# Patient Record
Sex: MALE | Race: WHITE | NOT HISPANIC OR LATINO | Employment: FULL TIME
[De-identification: names, ages, dates, MRNs, and addresses within clinical notes are randomized per-mention and may not be internally consistent; named-entity substitution may affect disease eponyms.]

---

## 2019-11-05 ENCOUNTER — HEALTH MAINTENANCE LETTER (OUTPATIENT)
Age: 49
End: 2019-11-05

## 2020-11-22 ENCOUNTER — HEALTH MAINTENANCE LETTER (OUTPATIENT)
Age: 50
End: 2020-11-22

## 2020-12-21 ENCOUNTER — OFFICE VISIT (OUTPATIENT)
Dept: INTERNAL MEDICINE | Facility: CLINIC | Age: 50
End: 2020-12-21
Payer: COMMERCIAL

## 2020-12-21 ENCOUNTER — TELEPHONE (OUTPATIENT)
Dept: FAMILY MEDICINE | Facility: CLINIC | Age: 50
End: 2020-12-21

## 2020-12-21 VITALS
HEIGHT: 72 IN | OXYGEN SATURATION: 98 % | SYSTOLIC BLOOD PRESSURE: 151 MMHG | WEIGHT: 214.7 LBS | RESPIRATION RATE: 18 BRPM | DIASTOLIC BLOOD PRESSURE: 95 MMHG | TEMPERATURE: 98.2 F | BODY MASS INDEX: 29.08 KG/M2 | HEART RATE: 112 BPM

## 2020-12-21 DIAGNOSIS — J01.90 ACUTE SINUSITIS WITH SYMPTOMS > 10 DAYS: Primary | ICD-10-CM

## 2020-12-21 DIAGNOSIS — J02.9 PHARYNGITIS, UNSPECIFIED ETIOLOGY: ICD-10-CM

## 2020-12-21 LAB
DEPRECATED S PYO AG THROAT QL EIA: NEGATIVE
FLUAV+FLUBV AG SPEC QL: NEGATIVE
FLUAV+FLUBV AG SPEC QL: NEGATIVE
SPECIMEN SOURCE: NORMAL
STREP GROUP A PCR: NOT DETECTED

## 2020-12-21 PROCEDURE — 87804 INFLUENZA ASSAY W/OPTIC: CPT | Performed by: INTERNAL MEDICINE

## 2020-12-21 PROCEDURE — 99N1174 PR STATISTIC STREP A RAPID: Performed by: INTERNAL MEDICINE

## 2020-12-21 PROCEDURE — 87651 STREP A DNA AMP PROBE: CPT | Performed by: INTERNAL MEDICINE

## 2020-12-21 PROCEDURE — 99203 OFFICE O/P NEW LOW 30 MIN: CPT | Performed by: INTERNAL MEDICINE

## 2020-12-21 RX ORDER — LAMOTRIGINE 150 MG/1
150 TABLET ORAL 2 TIMES DAILY WITH MEALS
COMMUNITY
Start: 2020-06-27 | End: 2022-09-21

## 2020-12-21 RX ORDER — AZITHROMYCIN 250 MG/1
TABLET, FILM COATED ORAL
Qty: 6 TABLET | Refills: 0 | Status: SHIPPED | OUTPATIENT
Start: 2020-12-21 | End: 2020-12-26

## 2020-12-21 ASSESSMENT — MIFFLIN-ST. JEOR: SCORE: 1876.87

## 2020-12-21 NOTE — TELEPHONE ENCOUNTER
Patient calls to report having a cold for a couple weeks and he is now having sinus pressure.  No travel, No known exposure, sore throat from post nasal drip, no fever, no fatigue or body aches, always has taste and smell issues.

## 2020-12-21 NOTE — NURSING NOTE
BP (!) 151/95 (BP Location: Left arm, Patient Position: Sitting, Cuff Size: Adult Regular)   Pulse 112   Temp 98.2  F (36.8  C) (Oral)   Resp 18   Ht 1.829 m (6')   Wt 97.4 kg (214 lb 11.2 oz)   SpO2 98%   BMI 29.12 kg/m

## 2020-12-21 NOTE — PROGRESS NOTES
Subjective     Westley Singh is a 50 year old male who presents to clinic today for the following health issues:    HPI       Presents with complaints of 4 to 5 weeks of persistent nasal congestion, postnasal drainage, rhinorrhea.  His symptoms started fairly suddenly with a lot of sneezing, nasal stuffiness.  It remained congested since that time though the sneezing seem to have cleared.  He is already Allegra for allergies, has been taking some Sudafed.  He has had some gradual increase with occasional slight yellow mucus but never very thick or green, some mild sore throat in the morning from drainage then today he awakened with swelling of his left cheek and along the nose that was quite marked this morning.  Is improved a little with ice but still red and tender alongside the nose, also has some mild soreness in his teeth on the left side today.  He has some mild ear pressure today.  He has had some slight cloudiness of his vision in the morning but no other discharge from the eyes, soreness of the eyes.    He donates blood and platelets and has had a couple of negative Covid antibody test done since the onset of the symptoms.  No known exposures to any illnesses.        Patient Active Problem List   Diagnosis     Nasal polyp     CARDIOVASCULAR SCREENING; LDL GOAL LESS THAN 160     Mild persistent asthma     Anxiety     Insomnia     Major depressive disorder, recurrent episode (H)     Major depressive disorder, recurrent episode, moderate (H)     ADD (attention deficit disorder)     Current Outpatient Medications   Medication Sig Dispense Refill     albuterol (PROAIR HFA, PROVENTIL HFA, VENTOLIN HFA) 108 (90 BASE) MCG/ACT inhaler Inhale 2 puffs into the lungs every 6 hours as needed for shortness of breath / dyspnea 3 Inhaler 1     ALLEGRA 180 MG OR TABS 1 tablet qd  11     fluticasone-salmeterol (ADVAIR DISKUS) 250-50 MCG/DOSE diskus inhaler Inhale 1 puff into the lungs daily 3 Inhaler 3     lamoTRIgine  (LAMICTAL) 150 MG tablet Take 150 mg by mouth 2 times daily (with meals)          Review of Systems   No fever, chills, loss of smell or taste, headache, feels some slight tightness in the chest, known asthma.  No significant dyspnea on exertion, minimal cough that is related to postnasal drainage.      Objective    BP (!) 151/95 (BP Location: Left arm, Patient Position: Sitting, Cuff Size: Adult Regular)   Pulse 112   Temp 98.2  F (36.8  C) (Oral)   Resp 18   Ht 1.829 m (6')   Wt 97.4 kg (214 lb 11.2 oz)   SpO2 98%   BMI 29.12 kg/m    Body mass index is 29.12 kg/m .  Physical Exam       TM's are clear   Nasal mucosa with marked edema on the left with pale mucosa, mild to moderate swelling on the right.  On the left, mucus is  present, slightly thick, grayish  Sinuses are with tenderness on the left maxillary sinus, also some moderate swelling, erythema and induration along the left bridge of the nose which is tender, mild to moderate swelling of the cheek under the eye on the left.  Posterior pharynx without erythema, without exudate  Anterior cervical nodes are not present   Lungs are clear with normal respiration, mild  wheezes are present with forced expiration.    Strep:  Influenza:        Assessment & Plan     Acute sinusitis with symptoms > 10 days  He may have underlying sinusitis, may have a tear duct infection currently.  Will treat with an antibiotic, recommend warm packing to the tear duct, then can use ice on the swelling of the face, add Flonase, continue Sudafed and Allegra, and Mucinex if any thick mucus.  Advised he may need to be evaluated again if there is marked increase in the facial redness and pain  - azithromycin (ZITHROMAX) 250 MG tablet; Take 2 tablets (500 mg) by mouth daily for 1 day, THEN 1 tablet (250 mg) daily for 4 days.    Pharyngitis, unspecified etiology  Likely related to the postnasal drainage.  - Influenza A & B Antigen  - Streptococcus A Rapid Scr w Reflx to  PCR    Return in about 3 months (around 3/21/2021) for Physical Exam.    Jumana Staley MD  [facility]

## 2021-03-24 ENCOUNTER — OFFICE VISIT (OUTPATIENT)
Dept: FAMILY MEDICINE | Facility: CLINIC | Age: 51
End: 2021-03-24
Payer: COMMERCIAL

## 2021-03-24 VITALS
DIASTOLIC BLOOD PRESSURE: 96 MMHG | WEIGHT: 222.9 LBS | HEIGHT: 71 IN | HEART RATE: 78 BPM | BODY MASS INDEX: 31.21 KG/M2 | TEMPERATURE: 98.7 F | OXYGEN SATURATION: 99 % | RESPIRATION RATE: 16 BRPM | SYSTOLIC BLOOD PRESSURE: 138 MMHG

## 2021-03-24 DIAGNOSIS — Z13.220 SCREENING FOR HYPERLIPIDEMIA: ICD-10-CM

## 2021-03-24 DIAGNOSIS — B35.1 FUNGAL INFECTION OF NAIL: ICD-10-CM

## 2021-03-24 DIAGNOSIS — Z00.00 ROUTINE GENERAL MEDICAL EXAMINATION AT A HEALTH CARE FACILITY: ICD-10-CM

## 2021-03-24 DIAGNOSIS — F33.1 MAJOR DEPRESSIVE DISORDER, RECURRENT EPISODE, MODERATE (H): ICD-10-CM

## 2021-03-24 DIAGNOSIS — Z12.11 SCREEN FOR COLON CANCER: Primary | ICD-10-CM

## 2021-03-24 DIAGNOSIS — J45.30 MILD PERSISTENT ASTHMA WITHOUT COMPLICATION: ICD-10-CM

## 2021-03-24 DIAGNOSIS — Z11.59 NEED FOR HEPATITIS C SCREENING TEST: ICD-10-CM

## 2021-03-24 DIAGNOSIS — R03.0 ELEVATED BP WITHOUT DIAGNOSIS OF HYPERTENSION: ICD-10-CM

## 2021-03-24 DIAGNOSIS — R79.89 ELEVATED TSH: ICD-10-CM

## 2021-03-24 DIAGNOSIS — Z11.4 SCREENING FOR HIV (HUMAN IMMUNODEFICIENCY VIRUS): ICD-10-CM

## 2021-03-24 LAB
ALBUMIN SERPL-MCNC: 3.7 G/DL (ref 3.4–5)
ALP SERPL-CCNC: 48 U/L (ref 40–150)
ALT SERPL W P-5'-P-CCNC: 32 U/L (ref 0–70)
ANION GAP SERPL CALCULATED.3IONS-SCNC: <1 MMOL/L (ref 3–14)
AST SERPL W P-5'-P-CCNC: 20 U/L (ref 0–45)
BILIRUB SERPL-MCNC: 0.4 MG/DL (ref 0.2–1.3)
BUN SERPL-MCNC: 14 MG/DL (ref 7–30)
CALCIUM SERPL-MCNC: 8.8 MG/DL (ref 8.5–10.1)
CHLORIDE SERPL-SCNC: 105 MMOL/L (ref 94–109)
CHOLEST SERPL-MCNC: 150 MG/DL
CO2 SERPL-SCNC: 33 MMOL/L (ref 20–32)
CREAT SERPL-MCNC: 0.88 MG/DL (ref 0.66–1.25)
GFR SERPL CREATININE-BSD FRML MDRD: >90 ML/MIN/{1.73_M2}
GLUCOSE SERPL-MCNC: 98 MG/DL (ref 70–99)
HCV AB SERPL QL IA: NONREACTIVE
HDLC SERPL-MCNC: 65 MG/DL
HIV 1+2 AB+HIV1 P24 AG SERPL QL IA: NONREACTIVE
LDLC SERPL CALC-MCNC: 75 MG/DL
NONHDLC SERPL-MCNC: 85 MG/DL
POTASSIUM SERPL-SCNC: 4.3 MMOL/L (ref 3.4–5.3)
PROT SERPL-MCNC: 6.9 G/DL (ref 6.8–8.8)
SODIUM SERPL-SCNC: 138 MMOL/L (ref 133–144)
TRIGL SERPL-MCNC: 48 MG/DL
TSH SERPL DL<=0.005 MIU/L-ACNC: 1.04 MU/L (ref 0.4–4)

## 2021-03-24 PROCEDURE — 99213 OFFICE O/P EST LOW 20 MIN: CPT | Mod: 25 | Performed by: NURSE PRACTITIONER

## 2021-03-24 PROCEDURE — 36415 COLL VENOUS BLD VENIPUNCTURE: CPT | Performed by: NURSE PRACTITIONER

## 2021-03-24 PROCEDURE — 80053 COMPREHEN METABOLIC PANEL: CPT | Performed by: NURSE PRACTITIONER

## 2021-03-24 PROCEDURE — 86803 HEPATITIS C AB TEST: CPT | Performed by: NURSE PRACTITIONER

## 2021-03-24 PROCEDURE — 80061 LIPID PANEL: CPT | Performed by: NURSE PRACTITIONER

## 2021-03-24 PROCEDURE — 84443 ASSAY THYROID STIM HORMONE: CPT | Performed by: NURSE PRACTITIONER

## 2021-03-24 PROCEDURE — 87389 HIV-1 AG W/HIV-1&-2 AB AG IA: CPT | Performed by: NURSE PRACTITIONER

## 2021-03-24 PROCEDURE — 99396 PREV VISIT EST AGE 40-64: CPT | Performed by: NURSE PRACTITIONER

## 2021-03-24 PROCEDURE — 96127 BRIEF EMOTIONAL/BEHAV ASSMT: CPT | Performed by: NURSE PRACTITIONER

## 2021-03-24 RX ORDER — ALBUTEROL SULFATE 90 UG/1
2 AEROSOL, METERED RESPIRATORY (INHALATION) EVERY 6 HOURS PRN
Qty: 8 G | Refills: 1 | Status: SHIPPED | OUTPATIENT
Start: 2021-03-24 | End: 2023-04-24

## 2021-03-24 ASSESSMENT — MIFFLIN-ST. JEOR: SCORE: 1902.01

## 2021-03-24 ASSESSMENT — ENCOUNTER SYMPTOMS
FEVER: 0
HEARTBURN: 0
HEADACHES: 1
EYE PAIN: 0
HEMATOCHEZIA: 0
PARESTHESIAS: 0
MYALGIAS: 0
DIZZINESS: 0
JOINT SWELLING: 0
ABDOMINAL PAIN: 0
PALPITATIONS: 0
SORE THROAT: 0
DYSURIA: 0
ARTHRALGIAS: 0
DIARRHEA: 0
NAUSEA: 0
FREQUENCY: 0
NERVOUS/ANXIOUS: 0
CHILLS: 0
CONSTIPATION: 0
SHORTNESS OF BREATH: 0
WEAKNESS: 0
HEMATURIA: 0
COUGH: 0

## 2021-03-24 ASSESSMENT — ANXIETY QUESTIONNAIRES
2. NOT BEING ABLE TO STOP OR CONTROL WORRYING: NOT AT ALL
3. WORRYING TOO MUCH ABOUT DIFFERENT THINGS: SEVERAL DAYS
IF YOU CHECKED OFF ANY PROBLEMS ON THIS QUESTIONNAIRE, HOW DIFFICULT HAVE THESE PROBLEMS MADE IT FOR YOU TO DO YOUR WORK, TAKE CARE OF THINGS AT HOME, OR GET ALONG WITH OTHER PEOPLE: NOT DIFFICULT AT ALL
5. BEING SO RESTLESS THAT IT IS HARD TO SIT STILL: NOT AT ALL
7. FEELING AFRAID AS IF SOMETHING AWFUL MIGHT HAPPEN: NOT AT ALL
6. BECOMING EASILY ANNOYED OR IRRITABLE: SEVERAL DAYS
1. FEELING NERVOUS, ANXIOUS, OR ON EDGE: SEVERAL DAYS
GAD7 TOTAL SCORE: 4

## 2021-03-24 ASSESSMENT — PATIENT HEALTH QUESTIONNAIRE - PHQ9
5. POOR APPETITE OR OVEREATING: SEVERAL DAYS
SUM OF ALL RESPONSES TO PHQ QUESTIONS 1-9: 4

## 2021-03-24 NOTE — PROGRESS NOTES
"  3  SUBJECTIVE:   CC: Westley Singh is an 50 year old male who presents for preventive health visit.     {Split Bill scripting  The purpose of this visit is to discuss your medical history and prevent health problems before you are sick. You may be responsible for a co-pay, coinsurance, or deductible if your visit today includes services such as checking on a sore throat, having an x-ray or lab test, or treating and evaluating a new or existing condition :675880}  Patient has been advised of split billing requirements and indicates understanding: {Yes and No:861506}  Healthy Habits:    Do you get at least three servings of calcium containing foods daily (dairy, green leafy vegetables, etc.)? { :162329::\"yes\"}    Amount of exercise or daily activities, outside of work: { :617590}    Problems taking medications regularly { :675096::\"No\"}    Medication side effects: { :026637::\"No\"}    Have you had an eye exam in the past two years? { :903118}    Do you see a dentist twice per year? { :511670}    Do you have sleep apnea, excessive snoring or daytime drowsiness?{ :361118}  {Outside tests to abstract? :904749}    {additional problems to add (Optional):736807}    Today's PHQ-2 Score:   PHQ-2 ( 1999 Pfizer) 3/24/2021 12/13/2013   Q1: Little interest or pleasure in doing things - 1   Q2: Feeling down, depressed or hopeless - 1   PHQ-2 Score - 2   PHQ-2 Score Incomplete -     {PHQ-2 LOOK IN ASSESSMENTS (Optional) :159712}  Abuse: Current or Past(Physical, Sexual or Emotional)- {YES/NO/NA:046597}  Do you feel safe in your environment? {YES/NO/NA:782368}    Have you ever done Advance Care Planning? (For example, a Health Directive, POLST, or a discussion with a medical provider or your loved ones about your wishes): { :471475}    Social History     Tobacco Use     Smoking status: Current Some Day Smoker     Types: Cigars     Smokeless tobacco: Never Used   Substance Use Topics     Alcohol use: Yes     Comment: occasional use " "    If you drink alcohol do you typically have >3 drinks per day or >7 drinks per week? {ETOH :933985}                      Last PSA: No results found for: PSA    Reviewed orders with patient. Reviewed health maintenance and updated orders accordingly - {Yes/No:575559::\"Yes\"}  {Chronicprobdata (Optional):610809}    Reviewed and updated as needed this visit by clinical staff                 Reviewed and updated as needed this visit by Provider                {HISTORY OPTIONS (Optional):361138}    ROS:  { :370508::\"CONSTITUTIONAL: NEGATIVE for fever, chills, change in weight\",\"INTEGUMENTARY/SKIN: NEGATIVE for worrisome rashes, moles or lesions\",\"EYES: NEGATIVE for vision changes or irritation\",\"ENT: NEGATIVE for ear, mouth and throat problems\",\"RESP: NEGATIVE for significant cough or SOB\",\"CV: NEGATIVE for chest pain, palpitations or peripheral edema\",\"GI: NEGATIVE for nausea, abdominal pain, heartburn, or change in bowel habits\",\" male: negative for dysuria, hematuria, decreased urinary stream, erectile dysfunction, urethral discharge\",\"MUSCULOSKELETAL: NEGATIVE for significant arthralgias or myalgia\",\"NEURO: NEGATIVE for weakness, dizziness or paresthesias\",\"PSYCHIATRIC: NEGATIVE for changes in mood or affect\"}    OBJECTIVE:   There were no vitals taken for this visit.  EXAM:  {Exam Choices:979471}    {Diagnostic Test Results (Optional):819966::\"Diagnostic Test Results:\",\"Labs reviewed in Epic\"}    ASSESSMENT/PLAN:   {Diag Picklist:262989}    Patient has been advised of split billing requirements and indicates understanding: {YES / NO:667923::\"Yes\"}  COUNSELING:  {MALE COUNSELING MESSAGES:010999::\"Reviewed preventive health counseling, as reflected in patient instructions\"}    Estimated body mass index is 29.12 kg/m  as calculated from the following:    Height as of 12/21/20: 1.829 m (6').    Weight as of 12/21/20: 97.4 kg (214 lb 11.2 oz).    {Weight Management Plan (ACO) Complete if BMI is abnormal-  Ages 18-64  " BMI >24.9.  Age 65+ with BMI <23 or >30 (Optional):876250}    He reports that he has been smoking cigars. He has never used smokeless tobacco.  Tobacco Cessation Action Plan:   {TOBACCO CESSATION ACTION PLAN:283252}      Counseling Resources:  ATP IV Guidelines  Pooled Cohorts Equation Calculator  FRAX Risk Assessment  ICSI Preventive Guidelines  Dietary Guidelines for Americans, 2010  USDA's MyPlate  ASA Prophylaxis  Lung CA Screening    RUI Dickinson CNP  [facility]

## 2021-03-24 NOTE — NURSING NOTE
"Chief Complaint   Patient presents with     Physical     Initial BP (!) 138/96 (BP Location: Right arm, Patient Position: Sitting, Cuff Size: Adult Regular)   Pulse 78   Temp 98.7  F (37.1  C) (Oral)   Resp 16   Ht 1.809 m (5' 11.24\")   Wt 101.1 kg (222 lb 14.4 oz)   SpO2 99%   BMI 30.88 kg/m   Estimated body mass index is 30.88 kg/m  as calculated from the following:    Height as of this encounter: 1.809 m (5' 11.24\").    Weight as of this encounter: 101.1 kg (222 lb 14.4 oz).  BP completed using cuff size regular long right arm    Lisa Magill, CMA    "

## 2021-03-24 NOTE — PROGRESS NOTES
SUBJECTIVE:   CC: Westley Singh is an 50 year old male who presents for preventative health visit.       Patient has been advised of split billing requirements and indicates understanding: Yes  Healthy Habits:     Getting at least 3 servings of Calcium per day:  Yes    Bi-annual eye exam:  Yes    Dental care twice a year:  Yes    Sleep apnea or symptoms of sleep apnea:  None    Diet:  Regular (no restrictions)    Frequency of exercise:  6-7 days/week    Duration of exercise:  45-60 minutes    Taking medications regularly:  Yes    Medication side effects:  None    PHQ-2 Total Score: 2    Additional concerns today:  Yes    Asthma: not using daily inhaler anymore.  This winter symptoms seem to have worsened.  Using rescue inhaler 2x/week.  Typically spring is his worst time. Triggers: dust, seasonal allergies (spring time), and exercise.  Allergies are the biggest trigger.    ACT Total Scores 5/19/2014 12/19/2014 3/24/2021   ACT TOTAL SCORE 21 22 -   ASTHMA ER VISITS 0 = None 0 = None -   ASTHMA HOSPITALIZATIONS 0 = None 0 = None -   ACT TOTAL SCORE (Goal Greater than or Equal to 20) - - 20   In the past 12 months, how many times did you visit the emergency room for your asthma without being admitted to the hospital? - - 0   In the past 12 months, how many times were you hospitalized overnight because of your asthma? - - 0     Depression: managed by psych at [facility].  Taking daily lamictal and reports symptoms are well controlled.     PHQ 3/24/2021   PHQ-9 Total Score 4   Q9: Thoughts of better off dead/self-harm past 2 weeks Not at all       Concern - fungus   Onset: 2 years     Description:   Left big toe     Intensity: no pain    Progression of Symptoms:  worsening    Accompanying Signs & Symptoms:  Discolored and now nail falling apart    Previous history of similar problem:   Yes     Precipitating factors:   Worsened by: nothing     Alleviating factors:  Improved by: NOTHING     Therapies Tried and outcome:  antifungal medication did not do anything     Today's PHQ-2 Score:   PHQ-2 ( 1999 Pfizer) 3/24/2021   Q1: Little interest or pleasure in doing things 1   Q2: Feeling down, depressed or hopeless 1   PHQ-2 Score 2   Q1: Little interest or pleasure in doing things Several days   Q2: Feeling down, depressed or hopeless Several days   PHQ-2 Score 2       Abuse: Current or Past(Physical, Sexual or Emotional)- NO  Do you feel safe in your environment? YES    Have you ever done Advance Care Planning? (For example, a Health Directive, POLST, or a discussion with a medical provider or your loved ones about your wishes): No, advance care planning information given to patient to review.  Patient declined advance care planning discussion at this time.    Social History     Tobacco Use     Smoking status: Former Smoker     Types: Cigars     Smokeless tobacco: Never Used   Substance Use Topics     Alcohol use: Yes     Comment: occasional use     If you drink alcohol do you typically have >3 drinks per day or >7 drinks per week? No    Alcohol Use 3/24/2021   Prescreen: >3 drinks/day or >7 drinks/week? No   Prescreen: >3 drinks/day or >7 drinks/week? -   No flowsheet data found.    Last PSA: No results found for: PSA    Reviewed orders with patient. Reviewed health maintenance and updated orders accordingly - Yes  Labs reviewed in EPIC  BP Readings from Last 3 Encounters:   03/24/21 (!) 138/96   12/21/20 (!) 151/95   12/19/14 112/80    Wt Readings from Last 3 Encounters:   03/24/21 101.1 kg (222 lb 14.4 oz)   12/21/20 97.4 kg (214 lb 11.2 oz)   12/19/14 121.6 kg (268 lb)               Current Outpatient Medications   Medication Sig Dispense Refill     albuterol (PROAIR HFA/PROVENTIL HFA/VENTOLIN HFA) 108 (90 Base) MCG/ACT inhaler Inhale 2 puffs into the lungs every 6 hours as needed for shortness of breath / dyspnea 8 g 1     ALLEGRA 180 MG OR TABS 1 tablet qd  11     lamoTRIgine (LAMICTAL) 150 MG tablet Take 150 mg by mouth 2 times  "daily (with meals)       Allergies   Allergen Reactions     Aspirin      Cephalosporins      Penicillins        Reviewed and updated as needed this visit by clinical staff  Tobacco  Allergies  Meds  Problems  Med Hx  Surg Hx  Fam Hx  Soc Hx          Reviewed and updated as needed this visit by Provider  Tobacco  Allergies  Meds  Problems  Med Hx  Surg Hx  Fam Hx         Past Medical History:   Diagnosis Date     Anxiety      Asthma      Depression      Kidney infection 2009    Hospitalized x 2      Past Surgical History:   Procedure Laterality Date     ENT SURGERY      nasal polyps times 2     HERNIA REPAIR      under 3 years old     MAMMOPLASTY REDUCTION  age 19       Review of Systems   Constitutional: Negative for chills and fever.   HENT: Negative for congestion, ear pain, hearing loss and sore throat.    Eyes: Negative for pain and visual disturbance.   Respiratory: Negative for cough and shortness of breath.    Cardiovascular: Negative for chest pain, palpitations and peripheral edema.   Gastrointestinal: Negative for abdominal pain, constipation, diarrhea, heartburn, hematochezia and nausea.   Genitourinary: Negative for discharge, dysuria, frequency, genital sores, hematuria, impotence and urgency.   Musculoskeletal: Negative for arthralgias, joint swelling and myalgias.   Skin: Negative for rash.   Neurological: Positive for headaches. Negative for dizziness, weakness and paresthesias.   Psychiatric/Behavioral: Negative for mood changes. The patient is not nervous/anxious.        OBJECTIVE:   BP (!) 138/96 (BP Location: Right arm, Patient Position: Sitting, Cuff Size: Adult Regular)   Pulse 78   Temp 98.7  F (37.1  C) (Oral)   Resp 16   Ht 1.809 m (5' 11.24\")   Wt 101.1 kg (222 lb 14.4 oz)   SpO2 99%   BMI 30.88 kg/m      Physical Exam  GENERAL: healthy, alert and no distress  EYES: Eyes grossly normal to inspection, PERRL and conjunctivae and sclerae normal  HENT: ear canals and TM's " normal, nose and mouth without ulcers or lesions  NECK: no adenopathy, no asymmetry, masses, or scars and thyroid normal to palpation  RESP: lungs clear to auscultation - no rales, rhonchi or wheezes  CV: regular rate and rhythm, normal S1 S2, no S3 or S4, no murmur, click or rub, no peripheral edema and peripheral pulses strong  ABDOMEN: soft, nontender, no hepatosplenomegaly, no masses and bowel sounds normal  MS: no gross musculoskeletal defects noted, no edema  SKIN: no suspicious lesions or rashes, all five nails on L foot brittle, ragged, splitting and yellow  NEURO: Normal strength and tone, mentation intact and speech normal  PSYCH: mentation appears normal, affect normal/bright    Diagnostic Test Results:  Labs reviewed in Epic    ASSESSMENT/PLAN:   1. Routine general medical examination at a health care facility  - Lipid panel reflex to direct LDL Fasting  - Comprehensive metabolic panel (BMP + Alb, Alk Phos, ALT, AST, Total. Bili, TP)    2. Screening for HIV (human immunodeficiency virus)  - HIV Antigen Antibody Combo    3. Need for hepatitis C screening test  - Hepatitis C Screen Reflex to HCV RNA Quant and Genotype    4. Screening for hyperlipidemia  Fasting for lab today.     5. Screen for colon cancer  Turns 51 next month.  Discussed screening options.  Prefers cologuard.  Wait until after birthday to do the test.    - COLOGUARD([employer])    6. Mild persistent asthma without complication  Controlled; though has noticed an increase in albuterol use this week.  If worsens may need to go back on daily inhaler during seasonal flares (allergy season).   - albuterol (PROAIR HFA/PROVENTIL HFA/VENTOLIN HFA) 108 (90 Base) MCG/ACT inhaler; Inhale 2 puffs into the lungs every 6 hours as needed for shortness of breath / dyspnea  Dispense: 8 g; Refill: 1    7. Fungal infection of nail  Will start on oral tx if liver enzymes are normal.   - Comprehensive metabolic panel (BMP + Alb, Alk Phos, ALT, AST, Total.  "Judith, MARILYNN)    8. Elevated TSH  Reports has been borderline in the past; rechecking with labs today.   - TSH with free T4 reflex    9. Major depressive disorder, recurrent episode, moderate (H)  Continue daily lamictal; managed by psych.    10. Elevated BP without diagnosis of hypertension  Mildly elevated.  He feels increase in BP is related to increase in job stress.  Continue daily checks at home.  Reduce salt in diet, eat plenty of fruits and vegetables, work on weight loss.  Stay active (walks the dogs 2x/day for total of 40 minutes).  Will see him for follow-up in 2-3 months if still mildly elevated despite working on lifestyle changes consider treatment--consider treating sooner if seeing rising numbers at home.         COUNSELING:   Reviewed preventive health counseling, as reflected in patient instructions       Regular exercise       Healthy diet/nutrition       Consider Hep C screening for all patients one time for ages 18-79 years       HIV screeninx in teen years, 1x in adult years, and at intervals if high risk       Colon cancer screening    Estimated body mass index is 30.88 kg/m  as calculated from the following:    Height as of this encounter: 1.809 m (5' 11.24\").    Weight as of this encounter: 101.1 kg (222 lb 14.4 oz).     Weight management plan: Discussed healthy diet and exercise guidelines    He reports that he has quit smoking. His smoking use included cigars. He has never used smokeless tobacco.  Tobacco Cessation Action Plan:   not smoking      Counseling Resources:  ATP IV Guidelines  Pooled Cohorts Equation Calculator  FRAX Risk Assessment  ICSI Preventive Guidelines  Dietary Guidelines for Americans,   USDA's MyPlate  ASA Prophylaxis  Lung CA Screening    Abby Fuentes, RUI [facility]  "

## 2021-03-24 NOTE — PATIENT INSTRUCTIONS
Preventive Health Recommendations  Male Ages 40 to 49    Yearly exam:             See your health care provider every year in order to  o   Review health changes.   o   Discuss preventive care.    o   Review your medicines if your doctor has prescribed any.    You should be tested each year for STDs (sexually transmitted diseases) if you re at risk.     Have a cholesterol test every 5 years.     Have a colonoscopy (test for colon cancer) if someone in your family has had colon cancer or polyps before age 50.     After age 45, have a diabetes test (fasting glucose). If you are at risk for diabetes, you should have this test every 3 years.      Talk with your health care provider about whether or not a prostate cancer screening test (PSA) is right for you.    Shots: Get a flu shot each year. Get a tetanus shot every 10 years.     Nutrition:    Eat at least 5 servings of fruits and vegetables daily.     Eat whole-grain bread, whole-wheat pasta and brown rice instead of white grains and rice.     Get adequate Calcium and Vitamin D.     Lifestyle    Exercise for at least 150 minutes a week (30 minutes a day, 5 days a week). This will help you control your weight and prevent disease.     Limit alcohol to one drink per day.     No smoking.     Wear sunscreen to prevent skin cancer.     See your dentist every six months for an exam and cleaning.    Call [facility] for stool test to screen for colon cancer--[phone]

## 2021-03-25 ASSESSMENT — ANXIETY QUESTIONNAIRES: GAD7 TOTAL SCORE: 4

## 2021-03-25 ASSESSMENT — ASTHMA QUESTIONNAIRES: ACT_TOTALSCORE: 20

## 2021-04-02 ENCOUNTER — ANCILLARY PROCEDURE (OUTPATIENT)
Dept: GENERAL RADIOLOGY | Facility: CLINIC | Age: 51
End: 2021-04-02
Attending: PHYSICIAN ASSISTANT
Payer: COMMERCIAL

## 2021-04-02 ENCOUNTER — OFFICE VISIT (OUTPATIENT)
Dept: FAMILY MEDICINE | Facility: CLINIC | Age: 51
End: 2021-04-02
Payer: COMMERCIAL

## 2021-04-02 VITALS
OXYGEN SATURATION: 98 % | WEIGHT: 228 LBS | SYSTOLIC BLOOD PRESSURE: 146 MMHG | HEIGHT: 72 IN | HEART RATE: 66 BPM | RESPIRATION RATE: 18 BRPM | DIASTOLIC BLOOD PRESSURE: 96 MMHG | BODY MASS INDEX: 30.88 KG/M2 | TEMPERATURE: 98 F

## 2021-04-02 DIAGNOSIS — B35.1 FUNGAL INFECTION OF NAIL: ICD-10-CM

## 2021-04-02 DIAGNOSIS — M79.675 PAIN OF TOE OF LEFT FOOT: ICD-10-CM

## 2021-04-02 DIAGNOSIS — R03.0 ELEVATED BP WITHOUT DIAGNOSIS OF HYPERTENSION: ICD-10-CM

## 2021-04-02 DIAGNOSIS — M79.675 PAIN OF TOE OF LEFT FOOT: Primary | ICD-10-CM

## 2021-04-02 PROCEDURE — 99214 OFFICE O/P EST MOD 30 MIN: CPT | Performed by: PHYSICIAN ASSISTANT

## 2021-04-02 PROCEDURE — 73660 X-RAY EXAM OF TOE(S): CPT | Mod: LT | Performed by: RADIOLOGY

## 2021-04-02 RX ORDER — TERBINAFINE HYDROCHLORIDE 250 MG/1
250 TABLET ORAL DAILY
Qty: 90 TABLET | Refills: 0 | Status: SHIPPED | OUTPATIENT
Start: 2021-04-02 | End: 2021-06-08

## 2021-04-02 ASSESSMENT — MIFFLIN-ST. JEOR: SCORE: 1937.2

## 2021-04-02 NOTE — PROGRESS NOTES
Assessment & Plan     Pain of toe of left foot  Pain, redness, slight swelling to left middle toe after stubbing it on a suitcase wheel last night. X-ray negative. Toe was ray taped to 2nd digit with padding between for stability and comfort. Recommend rest, elevation, ice, ibuprofen. Follow-up if worsening or no improvement.   - XR Toe Left G/E 2 Views; Future    Elevated BP without diagnosis of hypertension  He is monitoring BP at home and working on lifestyle changes. Recommend he continue to monitor and follow-up in 2 months to recheck blood pressure. If still elevated, consider medication.    Fungal infection of nail  Chronic, recent LFTs normal. Will try lamisil. Recheck LFTs in 6 weeks and in 3 months.   - terbinafine (LAMISIL) 250 MG tablet; Take 1 tablet (250 mg) by mouth daily  - **Hepatic panel FUTURE 2mo; Future  - Hepatic panel (Albumin, ALT, AST, Bili, Alk Phos, TP); Future    Risks and benefits of treatment plan discussed. Patient and/or parent acknowledges and agrees with plan of care, all questions answered.     Return in about 2 months (around 6/2/2021) for BP Recheck.    MARYBETH [facility] AJITH Christy is a 50 year old who presents for the following health issues     HPI     Toe Injury  Onset/Duration: Injury Occurred last night at 2000  Description  Location:  Middle Toe of Left Foot  Joint Swelling: YES- Slight Swelling  Redness: YES  Pain: YES- Dull, sharp, and shooting  Warmth: no  Intensity:  4/10  Progression of Symptoms:  worsening  Accompanying signs and symptoms:   Fevers: no  Numbness/tingling/weakness: no  History  Trauma to the area: YES- Stubbed toe on suitcase wheel  Recent illness:  no  Previous similar problem: no  Previous evaluation:  no  Precipitating or alleviating factors:  Aggravating factors include: Icing made it worse  Therapies tried and outcome: nothing    Stubbed the left middle toe on the wheel of a suitcase last  night. Had sudden onset of pain, persistent since then. Toe looks red and slightly swollen. No previous injuries to the toe or foot. Pain worse with movement. He tried icing the toe this morning and that seemed to make it worse. Otherwise feeling well.    Review of Systems   Constitutional, HEENT, cardiovascular, pulmonary, gi and gu systems are negative, except as otherwise noted.      Objective    BP (!) 146/96   Pulse 66   Temp 98  F (36.7  C) (Oral)   Resp 18   Ht 1.829 m (6')   Wt 103.4 kg (228 lb)   SpO2 98%   BMI 30.92 kg/m    Body mass index is 30.92 kg/m .  Physical Exam   GENERAL: healthy, alert and no distress  MS: left 3rd toe appears slightly swollen and erythematous, no warmth, tender to palpation of toe in general and at the base of the toe, pain with any ROM of the toe. Other toes without tenderness, full ROM. No foot tenderness. DP and PT pulses intact. Onychomycosis to all toenails on left foot.  NEURO: Normal strength and tone, mentation intact and speech normal  PSYCH: mentation appears normal, affect normal/bright    Xray Toe Left Foot - Reviewed and interpreted by me: Negative.

## 2021-05-09 ENCOUNTER — OFFICE VISIT (OUTPATIENT)
Dept: URGENT CARE | Facility: URGENT CARE | Age: 51
End: 2021-05-09
Payer: COMMERCIAL

## 2021-05-09 VITALS
SYSTOLIC BLOOD PRESSURE: 108 MMHG | DIASTOLIC BLOOD PRESSURE: 88 MMHG | BODY MASS INDEX: 30.92 KG/M2 | TEMPERATURE: 98.5 F | OXYGEN SATURATION: 98 % | HEART RATE: 81 BPM | WEIGHT: 228 LBS

## 2021-05-09 DIAGNOSIS — H60.312 ACUTE DIFFUSE OTITIS EXTERNA OF LEFT EAR: Primary | ICD-10-CM

## 2021-05-09 PROCEDURE — 99214 OFFICE O/P EST MOD 30 MIN: CPT | Performed by: FAMILY MEDICINE

## 2021-05-09 RX ORDER — CIPROFLOXACIN AND DEXAMETHASONE 3; 1 MG/ML; MG/ML
4 SUSPENSION/ DROPS AURICULAR (OTIC) 2 TIMES DAILY
Qty: 4 ML | Refills: 0 | Status: SHIPPED | OUTPATIENT
Start: 2021-05-09 | End: 2021-05-19

## 2021-05-09 RX ORDER — CIPROFLOXACIN 500 MG/1
500 TABLET, FILM COATED ORAL 2 TIMES DAILY
Qty: 20 TABLET | Refills: 0 | Status: SHIPPED | OUTPATIENT
Start: 2021-05-09 | End: 2021-05-19

## 2021-05-09 NOTE — PATIENT INSTRUCTIONS
Go to the [facility] to have the ear wick placed into the left ear.      follow up if not better in 3-4 days.  Sooner if you develop fevers of 100.4 F or above.      -------    If the wick doesn't fall out on its own in the next 4 days, you can come to Urgent Care to have the wick removed.

## 2021-05-09 NOTE — PROGRESS NOTES
SUBJECTIVE:   Westley Singh is a 51 year old male presenting with a chief complaint of swelling, severe pain at the left tragus and at the entire left ear, pain at the left tragus area with chewing, decreased hearing out of the left ear and blood from the left ear.  Patient returned from [facility] on April 25, 2021.      Onset of symptoms was one day ago.  Course of illness is worsening.    Severity severe pain.   Current and Associated symptoms: as listed above.  No fevers. Patient did notice some blood yesterday after using a Q-tip swab yesterday.    Treatment measures tried include Q-tip swabs.  .  Predisposing factors include recent trip to [facility].    .    Past Medical History:   Diagnosis Date     Anxiety      Asthma      Depression      Kidney infection 2009    Hospitalized x 2   Nasal Polyps    Current Outpatient Medications   Medication Sig Dispense Refill     albuterol (PROAIR HFA/PROVENTIL HFA/VENTOLIN HFA) 108 (90 Base) MCG/ACT inhaler Inhale 2 puffs into the lungs every 6 hours as needed for shortness of breath / dyspnea 8 g 1     ALLEGRA 180 MG OR TABS 1 tablet qd (Patient taking differently: Taking over the counter brand)  11     lamoTRIgine (LAMICTAL) 150 MG tablet Take 150 mg by mouth 2 times daily (with meals)       terbinafine (LAMISIL) 250 MG tablet Take 1 tablet (250 mg) by mouth daily 90 tablet 0     Social History     Tobacco Use     Smoking status: Former Smoker     Types: Cigars     Smokeless tobacco: Never Used   Substance Use Topics     Alcohol use: Yes     Comment: occasional use       ROS:  CONSTITUTIONAL:negative for fevers.   ENT/MOUTH: positive for severe left ear pain.      OBJECTIVE:  /88   Pulse 81   Temp 98.5  F (36.9  C)   Wt 103.4 kg (228 lb)   SpO2 98%   BMI 30.92 kg/m    GENERAL APPEARANCE: healthy, alert and no distress  HENT: LEFT EAR:  There is moderate edema and there is tenderness over the left tragus.  There is some pain with movement of the left auricle.  The left  ear canal is severely swollen and very painful, and I cannot visualize the TM.      ASSESSMENT:  Severe Acute Otitis Externa of the left ear with severe edema of the left ear canal.      PLAN:  Outpatient Rx:  Ciprofloxacin 500 mg PO BID x 10 days.   Outpatient Rx:  Ciprodex Otic Suspension.  Unfortunately, the [facility] does not have ear jodi.  Patient will go to the [facility] to have this ear wick placed.    follow up if not better in 3 days.   Place warmth onto the painful swollen areas of the left ear.      Jim Bazan MD    Patient presented to [facility] as per Dr. Bazan's recommendation.  Ear wick placed on the left ear and hydrated with sterile water.  Well tolerated.    Tian Tam MD

## 2021-06-08 ENCOUNTER — VIRTUAL VISIT (OUTPATIENT)
Dept: FAMILY MEDICINE | Facility: CLINIC | Age: 51
End: 2021-06-08
Payer: COMMERCIAL

## 2021-06-08 ENCOUNTER — TELEPHONE (OUTPATIENT)
Dept: FAMILY MEDICINE | Facility: CLINIC | Age: 51
End: 2021-06-08

## 2021-06-08 DIAGNOSIS — B35.1 FUNGAL INFECTION OF NAIL: ICD-10-CM

## 2021-06-08 DIAGNOSIS — I10 HYPERTENSION, UNSPECIFIED TYPE: Primary | ICD-10-CM

## 2021-06-08 PROCEDURE — 99214 OFFICE O/P EST MOD 30 MIN: CPT | Mod: GT | Performed by: NURSE PRACTITIONER

## 2021-06-08 RX ORDER — TERBINAFINE HYDROCHLORIDE 250 MG/1
250 TABLET ORAL DAILY
Qty: 90 TABLET | Refills: 0 | Status: SHIPPED | OUTPATIENT
Start: 2021-06-08 | End: 2021-06-08

## 2021-06-08 NOTE — PROGRESS NOTES
Westley is a 51 year old who is being evaluated via a billable video visit.      How would you like to obtain your AVS? MyChart  If the video visit is dropped, the invitation should be resent by: Text to cell phone: [phone]  Will anyone else be joining your video visit? No      Video Start Time: 3:38pm    Assessment & Plan     Fungal infection of nail  Will come in to have LFT's rechecked.  Nail improving.  Will finish out course of terbinafine and then okay to stop.       Hypertension, unspecified type  Reviewed log of home readings.  He is >140/90 on most readings though typically running 140-150/90s.  Discussed lifestyle changes and he is motivated to lose weight.  Can continue to monitor BP though does not need to do this daily.  If still running high in 3 mos would consider starting medication.     BMI 30.0-30.9,adult  Diet and exercise discussed.              Return in about 3 months (around 9/8/2021) for BP follow up , Video Visit.    RUI Dickinson [facility]   Westley is a 51 year old who presents for the following health issues     HPI     Hypertension Follow-up - elevated blood pressure at previous visit.       Do you check your blood pressure regularly outside of the clinic? Yes     Are you following a low salt diet? No    Are your blood pressures ever more than 140 on the top number (systolic) OR more   than 90 on the bottom number (diastolic), for example 140/90? Yes      How many servings of fruits and vegetables do you eat daily?  4 or more    On average, how many sweetened beverages do you drink each day (Examples: soda, juice, sweet tea, etc.  Do NOT count diet or artificially sweetened beverages)?   0    How many days per week do you exercise enough to make your heart beat faster? 7    How many minutes a day do you exercise enough to make your heart beat faster? 30 - 60    How many days per week do you miss taking your medication? 0  Daily walking  35-40 minutes; sometimes twice a day.   Used to add salt at the table.  Since March has not been adding extra salt and trying to watch salt intake.  Does have a hx of asthma and allergies have been more bothersome this year affecting asthma a bit more than usual.  Taking daily OTC antihistamine.  Denies CP, palpitations, and edema.  Occasional headaches that he attributes to stress.      Review of Systems   Constitutional, HEENT, cardiovascular, pulmonary, gi and gu systems are negative, except as otherwise noted.      Objective    Vitals - Patient Reported  Systolic (Patient Reported): (!) 144  Diastolic (Patient Reported): (!) 90  Weight (Patient Reported): 100.7 kg (222 lb)  Height (Patient Reported): 182.9 cm (6')  BMI (Based on Pt Reported Ht/Wt): 30.11  Pain Score: No Pain (0)    BP Readings from Last 6 Encounters:   05/09/21 108/88   04/02/21 (!) 146/96   03/24/21 (!) 138/96   12/21/20 (!) 151/95   12/19/14 112/80   09/26/14 122/84       Vitals:  No vitals were obtained today due to virtual visit.    Physical Exam   GENERAL: Healthy, alert and no distress  EYES: Eyes grossly normal to inspection.  No discharge or erythema, or obvious scleral/conjunctival abnormalities.  RESP: No audible wheeze, cough, or visible cyanosis.  No visible retractions or increased work of breathing.    SKIN: Visible skin clear. No significant rash, abnormal pigmentation or lesions.  NEURO: Cranial nerves grossly intact.  Mentation and speech appropriate for age.  PSYCH: Mentation appears normal, affect normal/bright, judgement and insight intact, normal speech and appearance well-groomed.    No results found for this or any previous visit (from the past 24 hour(s)).            Video-Visit Details    Type of service:  Video Visit    Video End Time:3:49 PM    Originating Location (pt. Location): Home    Distant Location (provider location):  [facility]     Platform used for Video Visit: Sticher

## 2021-09-19 ENCOUNTER — HEALTH MAINTENANCE LETTER (OUTPATIENT)
Age: 51
End: 2021-09-19

## 2021-10-11 ENCOUNTER — NURSE TRIAGE (OUTPATIENT)
Dept: FAMILY MEDICINE | Facility: CLINIC | Age: 51
End: 2021-10-11

## 2021-10-11 ENCOUNTER — OFFICE VISIT (OUTPATIENT)
Dept: FAMILY MEDICINE | Facility: CLINIC | Age: 51
End: 2021-10-11
Payer: COMMERCIAL

## 2021-10-11 VITALS
HEIGHT: 72 IN | TEMPERATURE: 98 F | RESPIRATION RATE: 18 BRPM | HEART RATE: 63 BPM | BODY MASS INDEX: 29.12 KG/M2 | WEIGHT: 215 LBS | DIASTOLIC BLOOD PRESSURE: 99 MMHG | OXYGEN SATURATION: 98 % | SYSTOLIC BLOOD PRESSURE: 147 MMHG

## 2021-10-11 DIAGNOSIS — S06.0X0A CONCUSSION WITHOUT LOSS OF CONSCIOUSNESS, INITIAL ENCOUNTER: ICD-10-CM

## 2021-10-11 DIAGNOSIS — I10 HYPERTENSION, UNSPECIFIED TYPE: ICD-10-CM
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
## 2021-10-11 DIAGNOSIS — Z23 NEED FOR VACCINATION: ICD-10-CM

## 2021-10-11 DIAGNOSIS — S00.81XA ABRASION OF FACE, INITIAL ENCOUNTER: ICD-10-CM

## 2021-10-11 DIAGNOSIS — Z23 NEED FOR PROPHYLACTIC VACCINATION AND INOCULATION AGAINST INFLUENZA: ICD-10-CM

## 2021-10-11 PROCEDURE — 99214 OFFICE O/P EST MOD 30 MIN: CPT | Mod: 25 | Performed by: PHYSICIAN ASSISTANT

## 2021-10-11 PROCEDURE — 90715 TDAP VACCINE 7 YRS/> IM: CPT | Performed by: PHYSICIAN ASSISTANT

## 2021-10-11 PROCEDURE — 90472 IMMUNIZATION ADMIN EACH ADD: CPT | Performed by: PHYSICIAN ASSISTANT

## 2021-10-11 PROCEDURE — 90682 RIV4 VACC RECOMBINANT DNA IM: CPT | Performed by: PHYSICIAN ASSISTANT

## 2021-10-11 PROCEDURE — G0008 ADMIN INFLUENZA VIRUS VAC: HCPCS | Mod: 59 | Performed by: PHYSICIAN ASSISTANT

## 2021-10-11 RX ORDER — AMLODIPINE BESYLATE 2.5 MG/1
2.5 TABLET ORAL DAILY
Qty: 30 TABLET | Refills: 0 | Status: SHIPPED | OUTPATIENT
Start: 2021-10-11 | End: 2021-11-08

## 2021-10-11 ASSESSMENT — MIFFLIN-ST. JEOR: SCORE: 1873.23
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
## 2021-10-11 NOTE — TELEPHONE ENCOUNTER
Pt is calling back.   He has appt at 1:30pm today with Alicia provider: Lien Garcia PA-C.    Fell on Friday, landed on the front side, -- has scraping, swelling on the Right side of his face.  Has had a Persistent headache,  (back of his head) since fall.  No LOC.  No vomiting, no dizziness, no slurred speech, no unsteadiness,   No sensitivty to light - but some sensitive to loud noises.   Has had some Nausea, fatigue, a little trouble remembering - but it comes back    Alternating between tyl & IB every 3 hours.    Reason for Disposition    Severe headache    After 3 days and headache persists    Additional Information    Negative: Can't remember what happened (amnesia)    Negative: Vomiting once or more    Negative: Watery or blood-tinged fluid dripping from the nose or ears    Negative: ACUTE NEUROLOGIC SYMPTOM and now fine    Negative: Knocked out (unconscious) < 1 minute and now fine    Negative: Large swelling or bruise > 2 inches (5 cm)    Negative: Skin is split open or gaping (length > 1/2 inch or 12 mm)    Negative: Bleeding won't stop after 10 minutes of direct pressure (using correct technique)    Negative: One or two 'black eyes' (bruising, purple color of eyelids), and onset within 24 hours of head injury    Negative: Taking Coumadin (warfarin) or other strong blood thinner, or known bleeding disorder (e.g., thrombocytopenia)    Negative: Age over 65 years with and area of head swelling or bruise    Negative: Sounds like a serious injury to the triager    Protocols used: HEAD INJURY-A-OH

## 2021-10-11 NOTE — PROGRESS NOTES
Prior to immunization administration, verified patients identity using patient s name and date of birth. Please see Immunization Activity for additional information.     Screening Questionnaire for Adult Immunization    Are you sick today?   No   Do you have allergies to medications, food, a vaccine component or latex?   Yes   Have you ever had a serious reaction after receiving a vaccination?   No   Do you have a long-term health problem with heart, lung, kidney, or metabolic disease (e.g., diabetes), asthma, a blood disorder, no spleen, complement component deficiency, a cochlear implant, or a spinal fluid leak?  Are you on long-term aspirin therapy?   Yes   Do you have cancer, leukemia, HIV/AIDS, or any other immune system problem?   No   Do you have a parent, brother, or sister with an immune system problem?   No   In the past 3 months, have you taken medications that affect  your immune system, such as prednisone, other steroids, or anticancer drugs; drugs for the treatment of rheumatoid arthritis, Crohn s disease, or psoriasis; or have you had radiation treatments?   No   Have you had a seizure, or a brain or other nervous system problem?   No   During the past year, have you received a transfusion of blood or blood    products, or been given immune (gamma) globulin or antiviral drug?   No   For women: Are you pregnant or is there a chance you could become       pregnant during the next month?   No   Have you received any vaccinations in the past 4 weeks?   No     Immunization questionnaire was positive for at least one answer.  Notified Dafne Milian.        Per orders of Dafne Milian, injection of Tdap given by Martine Castro CMA. Patient instructed to remain in clinic for 15 minutes afterwards, and to report any adverse reaction to me immediately.       Screening performed by Martine Castro CMA on 10/11/2021 at 2:25 PM.

## 2021-10-11 NOTE — PATIENT INSTRUCTIONS
Keep abrasions clean and dry    Start amlodipine 2.5mg today    Continue to check blood pressures at home and record, bring to next visit with your regular doctor    Concussion care
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
## 2021-10-13 ENCOUNTER — NURSE TRIAGE (OUTPATIENT)
Dept: FAMILY MEDICINE | Facility: CLINIC | Age: 51
End: 2021-10-13

## 2021-10-13 DIAGNOSIS — S06.0X0A CONCUSSION WITHOUT LOSS OF CONSCIOUSNESS, INITIAL ENCOUNTER: Primary | ICD-10-CM

## 2021-10-13 NOTE — LETTER
October 14, 2021    RE:  Westley Singh                                                                                                                                                       [address] [address] [address]            To whom it may concern:    Westley Singh is under my professional care for Concussion without loss of consciousness.  He  may return to work with the following: please allow for 20-30 minutes breaks as needed after 1-2 hours of work x 2 weeks.        Sincerely,    Lien Mason PA-C

## 2021-10-13 NOTE — TELEPHONE ENCOUNTER
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 He is unsure if letter can be written sooner    Alice VALENTINE RN
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
## 2021-10-13 NOTE — TELEPHONE ENCOUNTER
If he is having a worsening headache would recommend a CT scan. He can wait until tomorrow to see how he is doing before scheduling. It is ordered. Please call [facility] to schedule your test: [phone]     Also need to know what kind of job he does and what limitations he would need for a letter. If he would like to discuss he could do a telephone visit. Please route back to RUI Loaiza CNP covering for Dafne

## 2021-10-14 ENCOUNTER — MYC MEDICAL ADVICE (OUTPATIENT)
Dept: FAMILY MEDICINE | Facility: CLINIC | Age: 51
End: 2021-10-14

## 2021-10-14 NOTE — TELEPHONE ENCOUNTER
Please have patient keep appointment with me next week.  I have written a letter for him for work (in chart).  Please ask him how he would like to receive it, MyChart or via mail--and send to him.  He needs to establish care with a primary care physician for further work notes.  Please ask him to do this in the next 2 weeks.  Thank you.

## 2021-10-14 NOTE — TELEPHONE ENCOUNTER
"Called patient to re-triage.  States that they usually  work at computer all day,   States they ar having a hard time   \"I have a hard time sleeping, headache hurts when I lie down\"  \"no tv, no phone,no reading, I kind of stare out the window\" \"Try to nap but it's hard.\"  CT: tomorrow.  OCT 15   2021 08:40 AM - CT HEAD W/O CONTRAST  [facility] CT ROOM 1     Symptoms seem to be worse,   Headache wont go away, aleve helped  Back of my head between my ears, that's what gets the worse when I lie down  Hour and a half princess- when working on the computer-, head ache left eye, gone away since I stopped.  Nausea,   \"more clumsy\" but not dizziness  symptom getting worse  Moderate pain- tylenol kind of keeps it manageable, constant  - taking 2000 mg two times a day, advised that they take 1000 mg at a time instead of 2000 mg, and to not exceed 4000 mg limit.  difficulty concentrating, but after long term.  No vomiting.   Light sensitivity: no  sensitive to lound noises.    Huddled with provider ( Dafne), said if symptoms worsen (vomiting, vision changes, etc) to go to ER. Called patient, reviewed information above, advised if worsening symptoms to go to ER. Patient expressed verbal understanding.    Triaged per Epic Triage Protocol, gave care advice which patient plans to follow.  See Care advice tab for more information.  Patent to call back if further questions or concerns.      Callback: [phone]- ok to leave detailed VM.    Kiya Girard RN  [employer]      Reason for Disposition    Concussion symptoms are worsening    Additional Information    Negative: Weakness (i.e., paralysis, loss of muscle strength) of the face, arm or leg on one side of the body    Negative: Loss of speech or garbled speech    Negative: Difficult to awaken or acting confused (e.g., disoriented, slurred speech)    Negative: Sounds like a life-threatening emergency to the triager    " Negative: Concussion suspected and has not been examined by a HCP    Negative: Mild traumatic brain injury (mTBI; concussion) and more than 14 days since head injury    Negative: Black eyes on both sides and onset within 24 hours of head injury    Protocols used: CONCUSSION (MTBI) LESS THAN 14 DAYS AGO FOLLOW-UP CALL-A-OH

## 2021-10-14 NOTE — TELEPHONE ENCOUNTER
Called and spoke to patient, relayed message from Dafne. Requested letter via [employer]. Routing to TRIAGE due to having dizziness and fatigue.     Camilla GARZA MA on 10/14/2021 at 12:58 PM

## 2021-10-15 ENCOUNTER — HOSPITAL ENCOUNTER (OUTPATIENT)
Dept: CT IMAGING | Facility: CLINIC | Age: 51
Discharge: HOME | End: 2021-10-15
Attending: PHYSICIAN ASSISTANT | Admitting: PHYSICIAN ASSISTANT
Payer: COMMERCIAL

## 2021-10-15 DIAGNOSIS — S06.0X0A CONCUSSION WITHOUT LOSS OF CONSCIOUSNESS, INITIAL ENCOUNTER: ICD-10-CM

## 2021-10-15 PROCEDURE — 70450 CT HEAD/BRAIN W/O DYE: CPT

## 2021-10-15 NOTE — TELEPHONE ENCOUNTER
"Thanks for the note---will see patient today and can discuss results at that time.    Dafne Leos -   Please respond to imaging with a mychart note - pt will look for mc results (triage may not be able to call back today)     Pt called as saw CT results. Still having symptoms/headaches.  \"Dizziness improves when lying down, however headaches  Worsen.\"  Wondering what next step is?  Is it MRI as indicated by radiologist, or other next step plan?    Hoping for some action to take over the weekend?  Advised realistically not much to do over the weekend as far as next steps, but if pain intollerable, could go to UC or ER.   Pt has appt on Monday morning as well.  Deepali Batres, RN  [facility] RN Triage Team    "

## 2021-10-16 ENCOUNTER — OFFICE VISIT (OUTPATIENT)
Dept: URGENT CARE | Facility: URGENT CARE | Age: 51
End: 2021-10-16
Payer: COMMERCIAL

## 2021-10-16 VITALS
WEIGHT: 210 LBS | HEART RATE: 78 BPM | BODY MASS INDEX: 28.48 KG/M2 | DIASTOLIC BLOOD PRESSURE: 79 MMHG | OXYGEN SATURATION: 97 % | SYSTOLIC BLOOD PRESSURE: 119 MMHG | TEMPERATURE: 98.3 F

## 2021-10-16 DIAGNOSIS — R35.0 URINARY FREQUENCY: ICD-10-CM

## 2021-10-16 DIAGNOSIS — M54.2 NECK PAIN: ICD-10-CM

## 2021-10-16 DIAGNOSIS — R11.0 NAUSEA: ICD-10-CM

## 2021-10-16 DIAGNOSIS — R10.84 ABDOMINAL PAIN, GENERALIZED: Primary | ICD-10-CM

## 2021-10-16 LAB
ALBUMIN SERPL-MCNC: 3.8 G/DL (ref 3.4–5)
ALBUMIN UR-MCNC: NEGATIVE MG/DL
ALP SERPL-CCNC: 61 U/L (ref 40–150)
ALT SERPL W P-5'-P-CCNC: 28 U/L
ANION GAP SERPL CALCULATED.3IONS-SCNC: 2 MMOL/L (ref 3–14)
APPEARANCE UR: CLEAR
AST SERPL W P-5'-P-CCNC: 28 U/L (ref 0–45)
BASOPHILS # BLD AUTO: 0 10E3/UL (ref 0–0.2)
BASOPHILS NFR BLD AUTO: 0 %
BILIRUB SERPL-MCNC: 0.6 MG/DL (ref 0.2–1.3)
BILIRUB UR QL STRIP: NEGATIVE
BUN SERPL-MCNC: 19 MG/DL (ref 7–30)
CALCIUM SERPL-MCNC: 9.5 MG/DL (ref 8.5–10.1)
CHLORIDE BLD-SCNC: 105 MMOL/L (ref 94–109)
CO2 SERPL-SCNC: 30 MMOL/L (ref 20–32)
COLOR UR AUTO: YELLOW
CREAT SERPL-MCNC: 1 MG/DL (ref 0.66–1.25)
EOSINOPHIL # BLD AUTO: 0.3 10E3/UL (ref 0–0.7)
EOSINOPHIL NFR BLD AUTO: 3 %
ERYTHROCYTE [DISTWIDTH] IN BLOOD BY AUTOMATED COUNT: 11.7 % (ref 10–15)
GFR SERPL CREATININE-BSD FRML MDRD: 87 ML/MIN/1.73M2
GLUCOSE BLD-MCNC: 112 MG/DL (ref 70–99)
GLUCOSE UR STRIP-MCNC: NEGATIVE MG/DL
HCT VFR BLD AUTO: 41.6 % (ref 40–53)
HGB BLD-MCNC: 13.5 G/DL (ref 13.3–17.7)
HGB UR QL STRIP: NEGATIVE
KETONES UR STRIP-MCNC: NEGATIVE MG/DL
LEUKOCYTE ESTERASE UR QL STRIP: NEGATIVE
LYMPHOCYTES # BLD AUTO: 1.7 10E3/UL (ref 0.8–5.3)
LYMPHOCYTES NFR BLD AUTO: 19 %
MCH RBC QN AUTO: 31.8 PG (ref 26.5–33)
MCHC RBC AUTO-ENTMCNC: 32.5 G/DL (ref 31.5–36.5)
MCV RBC AUTO: 98 FL (ref 78–100)
MONOCYTES # BLD AUTO: 1 10E3/UL (ref 0–1.3)
MONOCYTES NFR BLD AUTO: 11 %
NEUTROPHILS # BLD AUTO: 6.4 10E3/UL (ref 1.6–8.3)
NEUTROPHILS NFR BLD AUTO: 68 %
NITRATE UR QL: NEGATIVE
PH UR STRIP: 6 [PH] (ref 5–7)
PLATELET # BLD AUTO: 296 10E3/UL (ref 150–450)
POTASSIUM BLD-SCNC: 5 MMOL/L (ref 3.4–5.3)
PROT SERPL-MCNC: 7.2 G/DL (ref 6.8–8.8)
RBC # BLD AUTO: 4.24 10E6/UL (ref 4.4–5.9)
SODIUM SERPL-SCNC: 137 MMOL/L (ref 133–144)
SP GR UR STRIP: 1.02 (ref 1–1.03)
UROBILINOGEN UR STRIP-ACNC: 0.2 E.U./DL
WBC # BLD AUTO: 9.4 10E3/UL (ref 4–11)

## 2021-10-16 PROCEDURE — 80053 COMPREHEN METABOLIC PANEL: CPT | Performed by: FAMILY MEDICINE

## 2021-10-16 PROCEDURE — 99214 OFFICE O/P EST MOD 30 MIN: CPT | Performed by: FAMILY MEDICINE

## 2021-10-16 PROCEDURE — 36415 COLL VENOUS BLD VENIPUNCTURE: CPT | Performed by: FAMILY MEDICINE

## 2021-10-16 PROCEDURE — 81003 URINALYSIS AUTO W/O SCOPE: CPT | Performed by: FAMILY MEDICINE

## 2021-10-16 PROCEDURE — 85025 COMPLETE CBC W/AUTO DIFF WBC: CPT | Performed by: FAMILY MEDICINE

## 2021-10-16 RX ORDER — CYCLOBENZAPRINE HCL 5 MG
5-10 TABLET ORAL EVERY 8 HOURS PRN
Qty: 30 TABLET | Refills: 0 | Status: SHIPPED | OUTPATIENT
Start: 2021-10-16 | End: 2021-12-09

## 2021-10-16 RX ORDER — ONDANSETRON 4 MG/1
4-8 TABLET, ORALLY DISINTEGRATING ORAL EVERY 8 HOURS PRN
Qty: 12 TABLET | Refills: 0 | Status: SHIPPED | OUTPATIENT
Start: 2021-10-16 | End: 2021-12-09

## 2021-10-16 NOTE — PROGRESS NOTES
"SUBJECTIVE:  Chief Complaint   Patient presents with     Headache     Fall last friday, headache since and pressure behind head, dizzy upon standing, nausea, ringing in left ear mostly but also in right, abdominal cramps this am , frequent urinating without pain and diarrhea about 8 times today, tried imodium this am for diarrhea but still having abdominal cramps, fatigue, patient was seen on monday and started lisinopril       Westley Singh is a 51 year old male who presents with a chief complaint of head injury and headache.    Has appointment for follow up with primary provider on 10/18, had already obtained head CT scan yesterday.  Still struggling with a lot of headache and dizziness. Head injury occurred last week on 10/8.  Reviewed with patient that further headache/concussion concerns should be addressed by primary provider.    Patient complain of abdominal pain, diarrhea this morning.  Endorsed frequent urination, more fatigue.  Has been nauseated and unable to maintain fluids.  Did try taking imodium for diarrhea.  Having cramping pain.    Unable to sleep well, will get more neck pain when lays down and headache will be worse.      Past Medical History:   Diagnosis Date     Anxiety      Asthma      Depression      Depressive disorder      Hypertension 2009    On the high end of \"normal\" 120/80s     Kidney infection 2009    Hospitalized x 2     Thyroid disease     Hashimoto's diagnosis 2001     Current Outpatient Medications   Medication Sig Dispense Refill     albuterol (PROAIR HFA/PROVENTIL HFA/VENTOLIN HFA) 108 (90 Base) MCG/ACT inhaler Inhale 2 puffs into the lungs every 6 hours as needed for shortness of breath / dyspnea 8 g 1     ALLEGRA 180 MG OR TABS 1 tablet qd  11     amLODIPine (NORVASC) 2.5 MG tablet Take 1 tablet (2.5 mg) by mouth daily 30 tablet 0     lamoTRIgine (LAMICTAL) 150 MG tablet Take 150 mg by mouth 2 times daily (with meals)       Social History     Tobacco Use     Smoking status: " Former Smoker     Packs/day: 0.00     Years: 5.00     Pack years: 0.00     Types: Cigars     Start date: 2007     Quit date: 2016     Years since quittin.1     Smokeless tobacco: Never Used     Tobacco comment: Originally smoked from  to , restarted again .   Substance Use Topics     Alcohol use: Yes     Comment: occasional use       ROS:  Review of systems negative except as stated above.    EXAM:   /79   Pulse 78   Temp 98.3  F (36.8  C) (Tympanic)   Wt 95.3 kg (210 lb)   SpO2 97%   BMI 28.48 kg/m    GENERAL APPEARANCE: healthy, alert and mild distress  NECK: mild tenderness on posterior area   PSYCH:alert, affect anxious    Results for orders placed or performed in visit on 10/16/21   UA Macro with Reflex to Micro and Culture - lab collect     Status: Normal    Specimen: Urine, Clean Catch   Result Value Ref Range    Color Urine Yellow Colorless, Straw, Light Yellow, Yellow    Appearance Urine Clear Clear    Glucose Urine Negative Negative mg/dL    Bilirubin Urine Negative Negative    Ketones Urine Negative Negative mg/dL    Specific Gravity Urine 1.020 1.003 - 1.035    Blood Urine Negative Negative    pH Urine 6.0 5.0 - 7.0    Protein Albumin Urine Negative Negative mg/dL    Urobilinogen Urine 0.2 0.2, 1.0 E.U./dL    Nitrite Urine Negative Negative    Leukocyte Esterase Urine Negative Negative    Narrative    Microscopic not indicated   Comprehensive metabolic panel (BMP + Alb, Alk Phos, ALT, AST, Total. Bili, TP)     Status: Abnormal   Result Value Ref Range    Sodium 137 133 - 144 mmol/L    Potassium 5.0 3.4 - 5.3 mmol/L    Chloride 105 94 - 109 mmol/L    Carbon Dioxide (CO2) 30 20 - 32 mmol/L    Anion Gap 2 (L) 3 - 14 mmol/L    Urea Nitrogen 19 7 - 30 mg/dL    Creatinine 1.00 0.66 - 1.25 mg/dL    Calcium 9.5 8.5 - 10.1 mg/dL    Glucose 112 (H) 70 - 99 mg/dL    Alkaline Phosphatase 61 40 - 150 U/L    AST 28 0 - 45 U/L    ALT 28 U/L    Protein Total 7.2 6.8 - 8.8 g/dL     Albumin 3.8 3.4 - 5.0 g/dL    Bilirubin Total 0.6 0.2 - 1.3 mg/dL    GFR Estimate 87 >60 mL/min/1.73m2   CBC with platelets and differential     Status: Abnormal   Result Value Ref Range    WBC Count 9.4 4.0 - 11.0 10e3/uL    RBC Count 4.24 (L) 4.40 - 5.90 10e6/uL    Hemoglobin 13.5 13.3 - 17.7 g/dL    Hematocrit 41.6 40.0 - 53.0 %    MCV 98 78 - 100 fL    MCH 31.8 26.5 - 33.0 pg    MCHC 32.5 31.5 - 36.5 g/dL    RDW 11.7 10.0 - 15.0 %    Platelet Count 296 150 - 450 10e3/uL    % Neutrophils 68 %    % Lymphocytes 19 %    % Monocytes 11 %    % Eosinophils 3 %    % Basophils 0 %    Absolute Neutrophils 6.4 1.6 - 8.3 10e3/uL    Absolute Lymphocytes 1.7 0.8 - 5.3 10e3/uL    Absolute Monocytes 1.0 0.0 - 1.3 10e3/uL    Absolute Eosinophils 0.3 0.0 - 0.7 10e3/uL    Absolute Basophils 0.0 0.0 - 0.2 10e3/uL   CBC with platelets and differential     Status: Abnormal    Narrative    The following orders were created for panel order CBC with platelets and differential.  Procedure                               Abnormality         Status                     ---------                               -----------         ------                     CBC with platelets and d...[[id number]]  Abnormal            Final result                 Please view results for these tests on the individual orders.         ASSESSMENT/PLAN:  (R10.84) Abdominal pain, generalized  (primary encounter diagnosis)  Comment: viral gastroenteritis  Plan: UA Macro with Reflex to Micro and Culture - lab        collect, CBC with platelets and differential,         Comprehensive metabolic panel (BMP + Alb, Alk         Phos, ALT, AST, Total. Bili, TP)            (R35.0) Urinary frequency  Plan: UA Macro with Reflex to Micro and Culture - lab        collect            (R11.0) Nausea  Plan: ondansetron (ZOFRAN ODT) 4 MG ODT tab            (M54.2) Neck pain  Plan: cyclobenzaprine (FLEXERIL) 5 MG tablet            Reviewed with patient that GI symptoms most likely separate  from concussion symptoms but can aggravated both ways.  Recommend that head injury/concussion concerns will need to be addressed by primary provider, this is outside  capabilities, reviewed that may need Concussion Clinic referral but this will need to be thru PCP.  Reminded patient the importance of brain rest and minimal visual stimulation.    Reviewed that current abdominal symptoms may be viral in etiology and that hydration is important in setting of diarrhea.  RX zofran given to help alleviate nausea symptoms, encourage to push fluids/electrolyte replacement.    Patient endorsed neck discomfort and this may be contributing to headache, RX flexeril given to see if this will help with muscle spasm that may be causing more tension-like headaches.  Okay for tylenol for discomfort    Follow up with primary provider in 2 days for head injury concerns.    Follow up with primary provider if no improvement of GI symptoms in 1 week    Devon Erickson MD  October 16, 2021 8:31 PM

## 2021-10-17 NOTE — PATIENT INSTRUCTIONS
"  Patient Education     Viral Gastroenteritis (Adult)    Gastroenteritis is commonly called the \"stomach flu,\" although it has nothing to do with influenza. It is most often caused by a virus that affects the stomach and intestinal tract and usually lasts from 2 to 7 days. Common viruses causing gastroenteritis include norovirus, rotavirus, and hepatitis A. Non-viral causes of gastroenteritis include bacteria, parasites, and toxins.  The danger from repeated vomiting or diarrhea is dehydration. This is the loss of too much fluid from the body. When this occurs, body fluids must be replaced. Antibiotics don't help with this illness because it is usually viral. Simple home treatment will be helpful.  Symptoms of viral gastroenteritis may include:    Watery, loose stools    Stomach pain or abdominal cramps    Fever and chills    Nausea and vomiting    Loss of bowel control    Headache  Home care  Gastroenteritis is transmitted by contact with the stool or vomit of an infected person. This can occur from person to person or from contact with a contaminated surface.  Follow these guidelines when caring for yourself at home:    If symptoms are severe, rest at home for the next 24 hours or until you are feeling better.    Wash your hands with soap and water or use alcohol-based  to prevent the spread of infection. Wash your hands after touching anyone who is sick.    Wash your hands or use alcohol-based  after using the toilet and before meals. Clean the toilet after each use.  Remember these tips when preparing food:    People with diarrhea should not prepare or serve food to others. When preparing foods, wash your hands before and after.    Wash your hands after using cutting boards, countertops, knives, or utensils that have been in contact with raw food.    Dry your hands with a single use towel.    Keep uncooked meats away from cooked and ready-to-eat foods.  Medicine  You may use acetaminophen or " NSAID medicines like ibuprofen or naproxen to control fever unless another medicine was given. If you have chronic liver or kidney disease, talk with your healthcare provider before using these medicines. Also talk with your provider if you've had a stomach ulcer or gastrointestinal bleeding. Don't give aspirin to anyone under 18 years of age who is ill with a fever. It may cause severe liver damage. Don't use NSAIDS is you are already taking one for another condition (like arthritis) or are on aspirin (such as for heart disease or after a stroke).  If medicine for vomiting or diarrhea are prescribed, take these only as directed. Nausea and diarrhea medicines are generally OK unless you have bleeding, fever, or severe abdominal pain.  Diet  Follow these guidelines for food:    Water and liquids are important so you don't get dehydrated. Drink a small amount at a time or suck on ice chips if you are vomiting.    If you eat, avoid fatty, greasy, spicy, or fried foods.    Don't eat dairy if you have diarrhea. This can make diarrhea worse.    Avoid tobacco, alcohol, and caffeine which may worsen symptoms.  During the first 24 hours (the first full day), follow the diet below:    Beverages. Sports drinks, soft drinks without caffeine, ginger ale, mineral water (plain or flavored), decaffeinated tea and coffee. If you are very dehydrated, sports drinks aren't a good choice. They have too much sugar and not enough electrolytes. In this case, commercially available products called oral rehydration solutions, are best.    Soups. Eat clear broth, consommé, and bouillon.    Desserts. Eat gelatin, ice pops, and fruit juice bars.  During the next 24 hours (the second day), you may add the following to the above:    Hot cereal, plain toast, bread, rolls, and crackers    Plain noodles, rice, mashed potatoes, chicken noodle or rice soup    Unsweetened canned fruit (avoid pineapple), bananas    Limit fat intake to less than 15 grams  per day. Do this by avoiding margarine, butter, oils, mayonnaise, sauces, gravies, fried foods, peanut butter, meat, poultry, and fish.    Limit fiber and avoid raw or cooked vegetables, fresh fruits (except bananas), and bran cereals.    Limit caffeine and chocolate. Don't use spices or seasonings other than salt.    Limit dairy products.    Avoid alcohol.  During the next 24 hours:    Gradually resume a normal diet as you feel better and your symptoms improve.    If at any time it starts getting worse again, go back to clear liquids until you feel better.  Follow-up care  Follow up with your healthcare provider, or as advised. Call your provider if you don't get better within 24 hours or if diarrhea lasts more than a week. Also follow up if you are unable to keep down liquids and get dehydrated. If a stool (diarrhea) sample was taken, call as directed for the results.  Call 911  Call 911 if any of these occur:    Trouble breathing    Chest pain    Confused    Severe drowsiness or trouble awakening    Fainting or loss of consciousness    Rapid heart rate    Seizure    Stiff neck  When to seek medical advice  Call your healthcare provider right away if any of these occur:    Abdominal pain that gets worse    Continued vomiting (unable to keep liquids down)    Frequent diarrhea (more than 5 times a day)    Blood in vomit or stool (black or red color)    Dark urine, reduced urine output, or extreme thirst    Weakness or dizziness    Drowsiness    Fever of 100.4 F (38 C) or higher, or as directed by your healthcare provider    New rash  [employer] last reviewed this educational content on 6/1/2018 2000-2021 [employer]. All rights reserved. This information is not intended as a substitute for professional medical care. Always follow your healthcare professional's instructions.           Patient Education     Neck Spasm   A spasm of the neck muscles can happen after a sudden awkward neck movement.  Sleeping with your neck in a crooked position can also cause spasm. Some people respond to emotional stress by tensing the muscles of their neck, shoulders, and upper back. If neck spasm lasts long enough, it can cause a headache.  The treatment described below will usually help the pain to go away in 5 to 7 days. Pain that continues may need further evaluation or other types of treatment such as physical therapy.  Home care    Rest and relax the muscles. Use a comfortable pillow that supports the head and keeps the spine in a neutral position. The position of the head should not be tilted forward or backward. A rolled up towel may help for a custom fit.    Some people find relief with heat. Heat can be applied with either a warm shower or bath or a moist towel heated in the microwave and massage. Others prefer cold packs. You can make an ice pack by filling a plastic bag that seals at the top with ice cubes or crushed ice and then wrapping it with a thin towel. Try both and use the method that feels best for 15 to 20 minutes, several times a day.    Whether using ice or heat, be careful that you don't injure your skin. Never put ice directly on the skin. Always wrap the ice in a towel or other type of cloth. This is very important, especially in people with poor skin sensation.    Try to reduce your stress level. Emotional stress can lead to neck muscle tension and get in the way of or delay the healing process.    You may use over-the-counter pain medicine to control pain, unless another medicine was prescribed. If you have chronic liver or kidney disease or ever had a stomach ulcer or gastrointestinal bleeding, talk with your healthcare provider before using these medicines.    Follow-up care  Follow up with your healthcare provider if your symptoms don't show signs of improvement after one week. Physical therapy or further tests may be needed.  If X-rays, CT scans, or MRI scans were taken, you will be told of  any new findings that may affect your care.  Call 911  Call 911 if you have:    Sudden weakness or numbness in one or both arms or legs    Neck swelling, trouble with or painful swallowing    Trouble breathing    Chest pain  When to seek medical advice  Call your healthcare provider right away if any of these occur:    Pain becomes worse or spreads into one or both arms or legs    Increasing headache with nausea or vomiting    Fever of 100.4 F (38 C) or higher, or as directed by your healthcare provider    Lise Guadalupe last reviewed this educational content on 5/1/2018 2000-2021 [employer]. All rights reserved. This information is not intended as a substitute for professional medical care. Always follow your healthcare professional's instructions.

## 2021-10-18 ENCOUNTER — TELEPHONE (OUTPATIENT)
Dept: FAMILY MEDICINE | Facility: CLINIC | Age: 51
End: 2021-10-18

## 2021-10-18 ENCOUNTER — VIRTUAL VISIT (OUTPATIENT)
Dept: FAMILY MEDICINE | Facility: CLINIC | Age: 51
End: 2021-10-18
Payer: COMMERCIAL

## 2021-10-18 DIAGNOSIS — S06.0X0D CONCUSSION WITHOUT LOSS OF CONSCIOUSNESS, SUBSEQUENT ENCOUNTER: Primary | ICD-10-CM

## 2021-10-18 PROCEDURE — 99213 OFFICE O/P EST LOW 20 MIN: CPT | Mod: GT | Performed by: PHYSICIAN ASSISTANT

## 2021-10-18 NOTE — PROGRESS NOTES
Westley is a 51 year old who is being evaluated via a billable telephone visit.      Assessment and Plan:     (S06.0X0D) Concussion without loss of consciousness, subsequent encounter  (primary encounter diagnosis)  Comment: doing about the same, continues to have headaches, intermittent dizziness, could not access my work note via [employer], would like mailed to him, recent CT scan negative for bleed, did show possible Chiari I malformation, MRI to fully eval if indicated  Plan: discussed possible Chiari I incidental finding on CT with patient and discussed MRI with radiologist today, rad rec MRI w/out contrast to further evaluate, this was ordered today and # for imaging scheduling given to patient to arrange,  he will be seen in concussion clinic next week, he will also see his pcp in early November, recommend he discuss the need further work restrictions with pcp      Lien Mason PA-C        Subjective   Westley is a 51 year old who presents for the following health issues      10:48-10:53    HPI     Would like letter mailed to him   Has ringing in ears and continues to have constant headache, using tylenol which helps  Has intermittent dizziness and nausea, no vomiting  Had head CT which did not show any bleed, did show possible Chiari I malformation  Has appointment with concussion clinic next week  Has appointment with pcp Tello) on 11/5      ED/UC Followup:    Facility:  [facility]  Date of visit: 10/16/2021  Reason for visit:     Abdominal pain  Urinary frequency  Nausea  Neck pain    Current Status: stable         Review of Systems   See above      Objective    Vitals - Patient Reported  Systolic (Patient Reported): 111  Diastolic (Patient Reported): 79  Pulse (Patient Reported): 75      Vitals:  No vitals were obtained today due to virtual visit.    Physical Exam   No exam phone call visit        Phone call duration: 5 minutes

## 2021-10-18 NOTE — TELEPHONE ENCOUNTER
Patient calling with ongoing symptoms of headache and dizziness since his fall last Friday. He was dx with a concussion.  He had a visit today for this at the [place] location. Did also have a visit to  on 10/16/21.  Please see visit notes.     Patient stated the provider will order a MRI she is just waiting on radiology to see which one to order. He will get this scheduled. He does have appt with the concussion clinic early next week as well.     Patient stated he was informed to contact primary provider for further recommendations. Patient stated he can not work from the ongoing dizziness and headaches. He stated Abby is his primary provider and would like her advise.     Please advise.     Mariely Johnson RN on 10/18/2021 at 11:21 AM

## 2021-10-18 NOTE — PATIENT INSTRUCTIONS
Follow-up with concussion as scheduled     Follow-up with pcp as scheduled in November    Work note mailed today    Continue with concussion precautions as discussed

## 2021-10-18 NOTE — RESULT ENCOUNTER NOTE
Results discussed directly with patient on the phone, ordered MRI for follow-up.  Any further details documented in the note.   Lien Mason PA-C

## 2021-10-18 NOTE — TELEPHONE ENCOUNTER
Can use tylenol as needed for headaches.  Stay hydrated and brain rest.  Can take time for symptoms to fully resolve.      Abby Fuentes CNP

## 2021-10-20 ENCOUNTER — HOSPITAL ENCOUNTER (OUTPATIENT)
Dept: MRI IMAGING | Facility: CLINIC | Age: 51
Discharge: HOME | End: 2021-10-20
Attending: PHYSICIAN ASSISTANT | Admitting: PHYSICIAN ASSISTANT
Payer: COMMERCIAL

## 2021-10-20 DIAGNOSIS — S06.0X0D CONCUSSION WITHOUT LOSS OF CONSCIOUSNESS, SUBSEQUENT ENCOUNTER: ICD-10-CM

## 2021-10-20 PROCEDURE — 70551 MRI BRAIN STEM W/O DYE: CPT

## 2021-10-21 ENCOUNTER — TELEPHONE (OUTPATIENT)
Dept: FAMILY MEDICINE | Facility: CLINIC | Age: 51
End: 2021-10-21
Payer: COMMERCIAL

## 2021-10-21 DIAGNOSIS — G93.5 CHIARI I MALFORMATION (H): Primary | ICD-10-CM

## 2021-10-25 ENCOUNTER — VIRTUAL VISIT (OUTPATIENT)
Dept: FAMILY MEDICINE | Facility: CLINIC | Age: 51
End: 2021-10-25
Payer: COMMERCIAL

## 2021-10-25 DIAGNOSIS — S06.0X0D CONCUSSION WITHOUT LOSS OF CONSCIOUSNESS, SUBSEQUENT ENCOUNTER: Primary | ICD-10-CM

## 2021-10-25 PROCEDURE — 99213 OFFICE O/P EST LOW 20 MIN: CPT | Mod: GT | Performed by: NURSE PRACTITIONER

## 2021-10-25 NOTE — PROGRESS NOTES
Westley is a 51 year old who is being evaluated via a billable video visit.      How would you like to obtain your AVS? MyChart  If the video visit is dropped, the invitation should be resent by: Text to cell phone: [phone]  Will anyone else be joining your video visit? No      Video Start Time: 1:48 PM    Assessment & Plan     Concussion without loss of consciousness, subsequent encounter  Continues to be symptomatic.  Stressed importance of brain rest.  Has appt with concussion clinic later this week.     Seen in the clinic for fall on 10/11/21.  /99 and subsequently was started on norvasc.  Prior to fall did not have a hx of HTN.  Continue on low dose norvasc.  Historically BP's were controlled.  Will see how BP is at follow-up appt, but may be able to stop depending on readings.     {Provider  Link to [facility] Help Grid :[id number]}    Follow-up next month for HTN  No follow-ups on file.    RUI Dickinson CNP  [facility]   Westley is a 51 year old who presents for the following health issues     HPI     Headache  Onset/Duration: 10/11/21  Description  Location: posterior side of head, between ears  Character: pressure, worse in the morning with waking up, turn head its vandana  Frequency:  daily  Duration:  All day  Wake with headaches: YES  Able to do daily activities when headache present: YES- take a break every hour, or one and half hours. Otherwise to tired  Intensity:  moderate, severe  Progression of Symptoms:  same and constant  Accompanying signs and symptoms:  Stiff neck: no  Neck or upper back pain: YES  Sinus or URI symptoms no   Fever: no  Nausea or vomiting: YES- nausea, has gotten better  Dizziness: YES  Numbness/tingling: no  Weakness: no  Visual changes: none  History  Head trauma: YES- see chart  Family history of migraines: not applicable  Daily pain medication use: not applicable  Previous tests for headaches: not applicable  Neurologist evaluation:  has appointment set for 1/18/22  Precipitating or Alleviating factors (light/sound/sleep/caffeine): head trauma  Therapies tried and outcome: Ibuprofen (Advil, Motrin) and Tylenol              Outcome - tolerable   Frequent/daily pain medication use: not applicable  Continues to have headaches, fatigue, and ringing in the ears.   If ambulates too quickly has dizziness.   Has an appt in the concussion clinic this week.   Incidentally found Chiari Malfomation on imaging for concussion/head injury.   He has an appt to see neurology in January.    He is back at work.  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    He is currently able to work an hour and then taking a 1/2 hour break.    After the hour of work fatigue increases.       PHQ 3/24/2021 10/25/2021   PHQ-9 Total Score 4 13   Q9: Thoughts of better off dead/self-harm past 2 weeks Not at all Not at all         Review of Systems   Constitutional, HEENT, cardiovascular, pulmonary, gi and gu systems are negative, except as otherwise noted.      Objective           Vitals:  No vitals were obtained today due to virtual visit.    Physical Exam   GENERAL: Healthy, alert and no distress  EYES: Eyes grossly normal to inspection.  No discharge or erythema, or obvious scleral/conjunctival abnormalities.  RESP: No audible wheeze, cough, or visible cyanosis.  No visible retractions or increased work of breathing.    SKIN: Visible skin clear. No significant rash, abnormal pigmentation or lesions.  NEURO: Cranial nerves grossly intact.  Mentation and speech appropriate for age.  PSYCH: Mentation appears normal, affect normal/bright, judgement and insight intact, normal speech and appearance well-groomed.    No results found for this or any previous visit (from the past 24 hour(s)).            Video-Visit Details    Type of service:  Video Visit    Video End Time:2:05 PM    Originating Location (pt. Location): Home    Distant Location (provider location):  [facility]  CL[facility] [place]     Platform used for Video Visit: Shayy

## 2021-10-26 ENCOUNTER — TELEPHONE (OUTPATIENT)
Dept: NEUROLOGY | Facility: CLINIC | Age: 51
End: 2021-10-26

## 2021-10-26 ASSESSMENT — ASTHMA QUESTIONNAIRES: ACT_TOTALSCORE: 21

## 2021-10-28 ENCOUNTER — VIRTUAL VISIT (OUTPATIENT)
Dept: NEUROLOGY | Facility: CLINIC | Age: 51
End: 2021-10-28
Payer: COMMERCIAL

## 2021-10-28 DIAGNOSIS — G47.00 INSOMNIA, UNSPECIFIED TYPE: ICD-10-CM

## 2021-10-28 DIAGNOSIS — R41.840 ATTENTION AND CONCENTRATION DEFICIT: ICD-10-CM

## 2021-10-28 DIAGNOSIS — F07.81 POST CONCUSSION SYNDROME: Primary | ICD-10-CM

## 2021-10-28 DIAGNOSIS — S06.9XAS MOOD DISORDER AS LATE EFFECT OF TRAUMATIC BRAIN INJURY (H): ICD-10-CM

## 2021-10-28 DIAGNOSIS — F06.30 MOOD DISORDER AS LATE EFFECT OF TRAUMATIC BRAIN INJURY (H): ICD-10-CM

## 2021-10-28 PROCEDURE — 99205 OFFICE O/P NEW HI 60 MIN: CPT | Mod: GT | Performed by: NURSE PRACTITIONER

## 2021-10-28 PROCEDURE — 99417 PROLNG OP E/M EACH 15 MIN: CPT | Mod: GT | Performed by: NURSE PRACTITIONER

## 2021-10-28 RX ORDER — BUPROPION HYDROCHLORIDE 150 MG/1
150 TABLET ORAL EVERY MORNING
Qty: 30 TABLET | Refills: 3 | Status: SHIPPED | OUTPATIENT
Start: 2021-10-28 | End: 2021-11-23 | Stop reason: DRUGHIGH
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
## 2021-11-05 ENCOUNTER — MYC MEDICAL ADVICE (OUTPATIENT)
Dept: NEUROLOGY | Facility: CLINIC | Age: 51
End: 2021-11-05

## 2021-11-05 ENCOUNTER — MYC MEDICAL ADVICE (OUTPATIENT)
Dept: FAMILY MEDICINE | Facility: CLINIC | Age: 51
End: 2021-11-05

## 2021-11-05 DIAGNOSIS — F06.30 MOOD DISORDER AS LATE EFFECT OF TRAUMATIC BRAIN INJURY (H): ICD-10-CM

## 2021-11-05 DIAGNOSIS — R41.840 ATTENTION AND CONCENTRATION DEFICIT: Primary | ICD-10-CM

## 2021-11-05 DIAGNOSIS — F07.81 POST CONCUSSION SYNDROME: ICD-10-CM

## 2021-11-05 DIAGNOSIS — I10 HYPERTENSION, UNSPECIFIED TYPE: ICD-10-CM

## 2021-11-05 DIAGNOSIS — S06.9XAS MOOD DISORDER AS LATE EFFECT OF TRAUMATIC BRAIN INJURY (H): ICD-10-CM

## 2021-11-08 RX ORDER — AMLODIPINE BESYLATE 2.5 MG/1
2.5 TABLET ORAL DAILY
Qty: 30 TABLET | Refills: 0 | Status: SHIPPED | OUTPATIENT
Start: 2021-11-08 | End: 2021-11-26

## 2021-11-08 NOTE — TELEPHONE ENCOUNTER
Routing refill request to provider for review/approval because:  Different provider initiated.     Next 5 appointments (look out 90 days)    Nov 26, 2021  9:00 AM  (Arrive by 8:40 AM)  Office Visit with RUI Marie CNP  [facility] ([facility] ) [address] [facility] [id number] [phone]   Dec 01, 2021  2:30 PM  Return Visit with RUI Arboleda CNP  [facility] ([facility] ) [address] [facility] [address] [phone]        Myra RAYMUNDO RN

## 2021-11-10 NOTE — TELEPHONE ENCOUNTER
MRI results on file. Pt has reviewed results. Encounter will be closed.    Subjective:     Chief Complaint   Patient presents with    Annual Wellness Visit     HPI:  Katie Fernandez is a 53 y.o. male who is on the schedule today for annual wellness exam and is also due for follow-up of chronic issues. he is willing to do both appointments today and realizes that there may be a co-pay for the follow-up portion of the visit. For the wellness:  Flu-refusing  Tetanus-refusing  Shingrix- not performed  [id number]- N/A  Prevnar 13- N/A  HCV screening- complete  PSA-N/A  Colon cancer screening-N/A  AAA screening- N/A  Low dose CT scan- N/A  Smoking status-quit a couple years ago  Moods- at goal  [address] Exams-Regularly, has new glasses, vision has improved with new glasses. For the acute/chronic issues:    History of COPD, better controlled with Breo. He would like to switch back to [facility]. States that both the [facility] and breo work well and he is using his albuterol less. Has not seen cardiologist.  Previous echo had abnormalities noted. Echo 10/27/2020  · LV: Estimated LVEF is 55 - 60%. Normal cavity size, wall thickness and systolic function (ejection fraction normal). Abnormal left ventricular septal motion consistent with paradoxic motion. Moderate (grade 2) left ventricular diastolic dysfunction. · IVC: Severely elevated central venous pressure (15+ mmHg); IVC diameter is larger than 21 mm and collapses less than 50% with respiration.        Past Medical History:   Diagnosis Date    Asthma     Emphysema of lung ([address]      Family History   Problem Relation Age of Onset    Diabetes Father     Cancer Father         Bladder    Neuropathy Father     Dementia Father     Cancer Brother     Dementia Maternal Grandmother      Social History     Socioeconomic History    Marital status:      Spouse name: Not on file    Number of children: Not on file    Years of education: Not on file    Highest education level: Not on file Occupational History    Not on file   Social Needs    Financial resource strain: Not on file    Food insecurity     Worry: Not on file     Inability: Not on file    Transportation needs     Medical: Not on file     Non-medical: Not on file   Tobacco Use    Smoking status: Former Smoker     Packs/day: 1.00     Types: Cigarettes     Quit date:      Years since quittin.0    Smokeless tobacco: Never Used   Substance and Sexual Activity    Alcohol use: Yes     Frequency: Monthly or less    Drug use: Yes     Types: Marijuana    Sexual activity: Not on file   Lifestyle    Physical activity     Days per week: Not on file     Minutes per session: Not on file    Stress: Not on file   Relationships    Social connections     Talks on phone: Not on file     Gets together: Not on file     Attends [employer] service: Not on file     Active member of club or organization: Not on file     Attends meetings of clubs or organizations: Not on file     Relationship status: Not on file    Intimate partner violence     Fear of current or ex partner: Not on file     Emotionally abused: Not on file     Physically abused: Not on file     Forced sexual activity: Not on file   Other Topics Concern    Not on file   Social History Narrative    Not on file     Current Outpatient Medications on File Prior to Visit   Medication Sig Dispense Refill    fluticasone furoate-vilanteroL (Breo Ellipta) 200-25 mcg/dose inhaler Take 1 Puff by inhalation daily for 90 days. 90 Each 0    tiotropium bromide (Spiriva Respimat) 2.5 mcg/actuation inhaler Take 2 Puffs by inhalation daily. 1 Inhaler 3    albuterol (PROVENTIL HFA, VENTOLIN HFA, PROAIR HFA) 90 mcg/actuation inhaler Take 1 Puff by inhalation daily as needed for Wheezing or Shortness of Breath. 1 Inhaler 3     No current facility-administered medications on file prior to visit.       Allergies   Allergen Reactions    Keflex [Cephalexin] Nausea and Vomiting     Review of Systems Constitutional: Negative for chills and fever. Respiratory: Positive for shortness of breath and wheezing. Negative for cough. As stated in HPI     Cardiovascular: Negative for chest pain. Gastrointestinal: Negative for abdominal pain. Genitourinary: Negative for dysuria. Objective:     Vitals:    01/19/21 0901   BP: 120/72   Pulse: 76   Temp: 98.6 °F (37 °C)   TempSrc: Temporal   SpO2: 98%   Weight: 124 lb 2 oz (56.3 kg)   Height: 6' 1\" (1.854 m)     Physical Exam  Vitals signs reviewed. Constitutional:       Appearance: Normal appearance. Comments: Thin   HENT:      Head: Normocephalic and atraumatic. Neck:      Musculoskeletal: Neck supple. Cardiovascular:      Rate and Rhythm: Normal rate and regular rhythm. Heart sounds: Normal heart sounds. No murmur. Pulmonary:      Effort: Pulmonary effort is normal.      Breath sounds: Normal breath sounds. No wheezing. Abdominal:      General: Abdomen is flat. Palpations: Abdomen is soft. Tenderness: There is no abdominal tenderness. Skin:     General: Skin is warm and dry. Neurological:      Mental Status: He is alert and oriented to person, place, and time. Mental status is at baseline. Psychiatric:         Mood and Affect: Mood normal.            Assessment/Plan:       1. Wellness examination          -     Update HCM. Follow-up labs. -    Refused vaccinations today  -     CBC WITH AUTOMATED DIFF  -     TSH 3RD GENERATION  -     METABOLIC PANEL, COMPREHENSIVE  -     LIPID PANEL  -     HEMOGLOBIN A1C WITH EAG    2. Chronic obstructive pulmonary disease, unspecified COPD type ([facility] [address]        -     Med changed as requested. Continue Spiriva, and PRN albuterol. Encouraged to make appt with pulmonologist.  -     mometasone-formoterol Jayant Koroma 200-5 mcg/actuation HFA inhaler; Take 2 Puffs by inhalation two (2) times a day.     3. Diastolic Dysfunction      Paradoxical Septal Motion      Severely elevated central venous pressure  Advised to see Cardiologist due to previous abnormal echo, but states he cannot afford that right now. We will follow-up labs today. Follow-up and Dispositions    · Return in about 6 months (around 7/19/2021) for COPD.          Burgess Jackie MD

## 2021-11-14 ENCOUNTER — HEALTH MAINTENANCE LETTER (OUTPATIENT)
Age: 51
End: 2021-11-14

## 2021-11-16 ENCOUNTER — TELEPHONE (OUTPATIENT)
Dept: NEUROLOGY | Facility: CLINIC | Age: 51
End: 2021-11-16
Payer: COMMERCIAL

## 2021-11-16 NOTE — TELEPHONE ENCOUNTER
Pt called due to increase in symptoms. Pt has woken up with a headache the past 2 days and has had an increase in Lethargy.  Pt wanted to know if there was anything he needed to worry about.     I reassured the patient about his symptoms and rescheduled his cancelled appt with PT.     Hilario Brooks LAT ATC

## 2021-11-22 NOTE — TELEPHONE ENCOUNTER
[facility] Call Center    Phone Message    May a detailed message be left on voicemail: yes     Reason for Call: Other: Patient needing refill for Wellbutrin, per patient he has not heard back regarding the refill.      Action Taken: Other: [facility] neuro clinic    Travel Screening: Not Applicable

## 2021-11-22 NOTE — TELEPHONE ENCOUNTER
"[facility]    \"Chrissy Bhatia --  Could you please send in a new prescription for the Wellbutrin at the 300mg / day? I had a 30 day supply of the 150mg tabs, but I'm about out now that I'm doubling it and the insurance won't cover a refill on the old one yet.\"     Thank you!  Westley Charles's next appt is scheduled on 12/1/2021.    Medication T'd for review and signature    YANA Coombs on 11/22/2021 at 3:32 PM    "

## 2021-11-23 DIAGNOSIS — S06.9XAS MOOD DISORDER AS LATE EFFECT OF TRAUMATIC BRAIN INJURY (H): ICD-10-CM

## 2021-11-23 DIAGNOSIS — F07.81 POST CONCUSSION SYNDROME: ICD-10-CM

## 2021-11-23 DIAGNOSIS — R41.840 ATTENTION AND CONCENTRATION DEFICIT: ICD-10-CM

## 2021-11-23 DIAGNOSIS — F06.30 MOOD DISORDER AS LATE EFFECT OF TRAUMATIC BRAIN INJURY (H): ICD-10-CM

## 2021-11-23 RX ORDER — BUPROPION HYDROCHLORIDE 300 MG/1
300 TABLET ORAL EVERY MORNING
Qty: 30 TABLET | Refills: 3 | Status: SHIPPED | OUTPATIENT
Start: 2021-11-23 | End: 2022-05-11

## 2021-11-23 RX ORDER — BUPROPION HYDROCHLORIDE 300 MG/1
300 TABLET ORAL EVERY MORNING
Qty: 30 TABLET | Refills: 0 | Status: SHIPPED | OUTPATIENT
Start: 2021-11-23 | End: 2021-11-23

## 2021-11-26 ENCOUNTER — OFFICE VISIT (OUTPATIENT)
Dept: FAMILY MEDICINE | Facility: CLINIC | Age: 51
End: 2021-11-26
Payer: COMMERCIAL

## 2021-11-26 VITALS
HEART RATE: 85 BPM | WEIGHT: 210 LBS | DIASTOLIC BLOOD PRESSURE: 82 MMHG | TEMPERATURE: 98.9 F | BODY MASS INDEX: 28.48 KG/M2 | OXYGEN SATURATION: 98 % | SYSTOLIC BLOOD PRESSURE: 128 MMHG

## 2021-11-26 DIAGNOSIS — R05.9 COUGH: ICD-10-CM

## 2021-11-26 DIAGNOSIS — I10 HYPERTENSION, UNSPECIFIED TYPE: ICD-10-CM

## 2021-11-26 DIAGNOSIS — Z12.11 SCREEN FOR COLON CANCER: Primary | ICD-10-CM

## 2021-11-26 LAB — SARS-COV-2 RNA RESP QL NAA+PROBE: NEGATIVE

## 2021-11-26 PROCEDURE — U0005 INFEC AGEN DETEC AMPLI PROBE: HCPCS | Performed by: NURSE PRACTITIONER

## 2021-11-26 PROCEDURE — U0003 INFECTIOUS AGENT DETECTION BY NUCLEIC ACID (DNA OR RNA); SEVERE ACUTE RESPIRATORY SYNDROME CORONAVIRUS 2 (SARS-COV-2) (CORONAVIRUS DISEASE [COVID-19]), AMPLIFIED PROBE TECHNIQUE, MAKING USE OF HIGH THROUGHPUT TECHNOLOGIES AS DESCRIBED BY CMS-2020-01-R: HCPCS | Performed by: NURSE PRACTITIONER

## 2021-11-26 PROCEDURE — 99214 OFFICE O/P EST MOD 30 MIN: CPT | Performed by: NURSE PRACTITIONER

## 2021-11-26 RX ORDER — AMLODIPINE BESYLATE 2.5 MG/1
2.5 TABLET ORAL DAILY
Qty: 90 TABLET | Refills: 1 | Status: SHIPPED | OUTPATIENT
Start: 2021-11-26 | End: 2022-04-20 | Stop reason: DRUGHIGH

## 2021-11-26 ASSESSMENT — PAIN SCALES - GENERAL: PAINLEVEL: MILD PAIN (2)

## 2021-11-26 ASSESSMENT — PATIENT HEALTH QUESTIONNAIRE - PHQ9
SUM OF ALL RESPONSES TO PHQ QUESTIONS 1-9: 15
10. IF YOU CHECKED OFF ANY PROBLEMS, HOW DIFFICULT HAVE THESE PROBLEMS MADE IT FOR YOU TO DO YOUR WORK, TAKE CARE OF THINGS AT HOME, OR GET ALONG WITH OTHER PEOPLE: SOMEWHAT DIFFICULT
SUM OF ALL RESPONSES TO PHQ QUESTIONS 1-9: 15

## 2021-11-26 NOTE — PROGRESS NOTES
Answers for HPI/ROS submitted by the patient on 11/26/2021  If you checked off any problems, how difficult have these problems made it for you to do your work, take care of things at home, or get along with other people?: Somewhat difficult  PHQ9 TOTAL SCORE: 15      Assessment & Plan     Screen for colon cancer  Never did cologuard; now thinking he would rather do colonoscopy.   - Adult Gastro Ref - Procedure Only; Future    Cough  Started today.  Testing today.  Discussed [facility]/CDC quarantine recommendations.  Supportive cares.   - Symptomatic COVID-19 Virus (Coronavirus) by PCR; Future  - Symptomatic COVID-19 Virus (Coronavirus) by PCR Nose    Hypertension, unspecified type  Well controlled; continue norvasc.   - amLODIPine (NORVASC) 2.5 MG tablet; Take 1 tablet (2.5 mg) by mouth daily               Return in about 5 months (around 4/26/2022) for Preventive Visit, In-Clinic Visit.    RUI Dickinson CNP  [facility] AJITH Christy is a 51 year old who presents for the following health issues     History of Present Illness       He eats 4 or more servings of fruits and vegetables daily.He consumes 0 sweetened beverage(s) daily.He exercises with enough effort to increase his heart rate 30 to 60 minutes per day.  He exercises with enough effort to increase his heart rate 7 days per week.   He is taking medications regularly.       Hypertension Follow-up      Do you check your blood pressure regularly outside of the clinic? Yes     Are you following a low salt diet? Yes    Are your blood pressures ever more than 140 on the top number (systolic) OR more   than 90 on the bottom number (diastolic), for example 140/90? Yes      How many servings of fruits and vegetables do you eat daily?  4 or more    On average, how many sweetened beverages do you drink each day (Examples: soda, juice, sweet tea, etc.  Do NOT count diet or artificially sweetened beverages)?   0    How many days per  week do you exercise enough to make your heart beat faster? 7    How many minutes a day do you exercise enough to make your heart beat faster? 30 - 60    How many days per week do you miss taking your medication? 0  BP mid 120-130/80s.  No CP, edema, SOB.  Not seeing spikes in BP since starting on norvasc.   BP Readings from Last 6 Encounters:   11/26/21 128/82   10/16/21 119/79   10/11/21 (!) 147/99   05/09/21 108/88   04/02/21 (!) 146/96   03/24/21 (!) 138/96       PT WOKE UP WITH COUGH, STEP SON IS HOME FOR THE HOLIDAYS AND HAS BEEN SLIGHTLY ILL.  This morning cough was a little productive.  Now not as productive. Not SOB, but feels harder to take a breath.  Hx of asthma.  Not flaring asthma.  Did try inhaler and this helped.    Step son with cough and low grade fever.  Home from college ([employer]).  He has not been tested for covid.  Hasn't had covid booster.       Review of Systems   Constitutional, HEENT, cardiovascular, pulmonary, gi and gu systems are negative, except as otherwise noted.      Objective    /82 (BP Location: Right arm, Patient Position: Sitting, Cuff Size: Adult Regular)   Pulse 85   Temp 98.9  F (37.2  C) (Oral)   Wt 95.3 kg (210 lb)   SpO2 98%   BMI 28.48 kg/m    Body mass index is 28.48 kg/m .  Physical Exam   GENERAL: healthy, alert and no distress  EYES: Eyes grossly normal to inspection and conjunctivae and sclerae normal  HENT: ear canals and TM's normal, nose and mouth without ulcers or lesions  NECK: no adenopathy, no asymmetry, masses, or scars and thyroid normal to palpation  RESP: lungs clear to auscultation - no rales, rhonchi or wheezes, normal effort  CV: regular rate and rhythm, normal S1 S2, no S3 or S4, no murmur, click or rub, no peripheral edema   MS: no gross musculoskeletal defects noted  SKIN: no suspicious lesions or rashes    No results found for this or any previous visit (from the past 24 hour(s)).

## 2021-11-27 ASSESSMENT — PATIENT HEALTH QUESTIONNAIRE - PHQ9: SUM OF ALL RESPONSES TO PHQ QUESTIONS 1-9: 15

## 2021-11-29 ENCOUNTER — VIRTUAL VISIT (OUTPATIENT)
Dept: URGENT CARE | Facility: CLINIC | Age: 51
End: 2021-11-29
Payer: COMMERCIAL

## 2021-11-29 DIAGNOSIS — Z20.822 EXPOSURE TO 2019 NOVEL CORONAVIRUS: Primary | ICD-10-CM

## 2021-11-29 DIAGNOSIS — R53.83 FATIGUE, UNSPECIFIED TYPE: ICD-10-CM

## 2021-11-29 PROCEDURE — 99213 OFFICE O/P EST LOW 20 MIN: CPT | Mod: TEL

## 2021-11-30 NOTE — PROGRESS NOTES
Westley is a 51 year old who is being evaluated via a billable telephone visit.      Assessment & Plan     Exposure to 2019 novel coronavirus  - Symptomatic COVID-19 Virus (Coronavirus) by PCR; Future    Fatigue, unspecified type  - Symptomatic COVID-19 Virus (Coronavirus) by PCR; Future    I will order covid testing and we will follow up with results.     Gudelia Merrill PA-C  Virtual Urgent Care  [facility] VIRTUAL URGENT CARE    Subjective   Westley is a 51 year old who presents for the following health issues: Covid concern    HPI - Patient had some chest congestion on Wednesday last week and was seen in clinic on Friday where he tested negative for Covid. He developed some fatigue Saturday and Sunday and then found out today that he was exposed to someone on Thursday that tested positive for Covid.    Review of Systems   Constitutional, HEENT, cardiovascular, pulmonary, gi and gu systems are negative, except as otherwise noted.      Objective           Vitals:  No vitals were obtained today due to virtual visit.    Physical Exam   healthy, alert and no distress  PSYCH: Alert and oriented times 3; coherent speech, normal   rate and volume, able to articulate logical thoughts, able   to abstract reason, no tangential thoughts, no hallucinations   or delusions  His affect is normal  RESP: No cough, no audible wheezing, able to talk in full sentences  Remainder of exam unable to be completed due to telephone visits        Phone call duration: 5 minutes

## 2021-12-01 ENCOUNTER — VIRTUAL VISIT (OUTPATIENT)
Dept: NEUROLOGY | Facility: CLINIC | Age: 51
End: 2021-12-01
Payer: COMMERCIAL

## 2021-12-01 ENCOUNTER — LAB (OUTPATIENT)
Dept: URGENT CARE | Facility: URGENT CARE | Age: 51
End: 2021-12-01
Attending: PHYSICIAN ASSISTANT
Payer: COMMERCIAL

## 2021-12-01 DIAGNOSIS — Z20.822 EXPOSURE TO 2019 NOVEL CORONAVIRUS: ICD-10-CM

## 2021-12-01 DIAGNOSIS — R53.83 FATIGUE, UNSPECIFIED TYPE: ICD-10-CM

## 2021-12-01 DIAGNOSIS — R41.840 ATTENTION AND CONCENTRATION DEFICIT: ICD-10-CM

## 2021-12-01 DIAGNOSIS — F07.81 POST CONCUSSION SYNDROME: Primary | ICD-10-CM

## 2021-12-01 PROCEDURE — 99215 OFFICE O/P EST HI 40 MIN: CPT | Mod: GT | Performed by: NURSE PRACTITIONER

## 2021-12-01 PROCEDURE — U0003 INFECTIOUS AGENT DETECTION BY NUCLEIC ACID (DNA OR RNA); SEVERE ACUTE RESPIRATORY SYNDROME CORONAVIRUS 2 (SARS-COV-2) (CORONAVIRUS DISEASE [COVID-19]), AMPLIFIED PROBE TECHNIQUE, MAKING USE OF HIGH THROUGHPUT TECHNOLOGIES AS DESCRIBED BY CMS-2020-01-R: HCPCS

## 2021-12-01 PROCEDURE — U0005 INFEC AGEN DETEC AMPLI PROBE: HCPCS

## 2021-12-01 RX ORDER — METHYLPHENIDATE HYDROCHLORIDE 5 MG/1
5 TABLET ORAL 2 TIMES DAILY
Qty: 60 TABLET | Refills: 0 | Status: SHIPPED | OUTPATIENT
Start: 2021-12-01 | End: 2021-12-28
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
## 2021-12-02 LAB — SARS-COV-2 RNA RESP QL NAA+PROBE: NEGATIVE

## 2021-12-21 ENCOUNTER — HOSPITAL ENCOUNTER (OUTPATIENT)
Dept: PHYSICAL THERAPY | Facility: CLINIC | Age: 51
Setting detail: THERAPIES SERIES
End: 2021-12-21
Attending: NURSE PRACTITIONER
Payer: COMMERCIAL

## 2021-12-21 PROCEDURE — 97162 PT EVAL MOD COMPLEX 30 MIN: CPT | Mod: GP | Performed by: PHYSICAL THERAPIST

## 2021-12-21 PROCEDURE — 97112 NEUROMUSCULAR REEDUCATION: CPT | Mod: GP | Performed by: PHYSICAL THERAPIST
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
## 2021-12-28 ENCOUNTER — MYC REFILL (OUTPATIENT)
Dept: NEUROLOGY | Facility: CLINIC | Age: 51
End: 2021-12-28

## 2021-12-28 ENCOUNTER — OFFICE VISIT (OUTPATIENT)
Dept: NEUROLOGY | Facility: CLINIC | Age: 51
End: 2021-12-28
Attending: PHYSICIAN ASSISTANT
Payer: COMMERCIAL

## 2021-12-28 VITALS
WEIGHT: 210.8 LBS | HEART RATE: 89 BPM | DIASTOLIC BLOOD PRESSURE: 101 MMHG | SYSTOLIC BLOOD PRESSURE: 169 MMHG | OXYGEN SATURATION: 99 % | BODY MASS INDEX: 28.59 KG/M2

## 2021-12-28 DIAGNOSIS — F07.81 POST CONCUSSION SYNDROME: ICD-10-CM

## 2021-12-28 DIAGNOSIS — G93.5 CHIARI I MALFORMATION (H): Primary | ICD-10-CM

## 2021-12-28 DIAGNOSIS — R41.840 ATTENTION AND CONCENTRATION DEFICIT: ICD-10-CM

## 2021-12-28 DIAGNOSIS — S09.90XD INJURY OF HEAD, SUBSEQUENT ENCOUNTER: ICD-10-CM

## 2021-12-28 PROCEDURE — G0463 HOSPITAL OUTPT CLINIC VISIT: HCPCS

## 2021-12-28 PROCEDURE — 99205 OFFICE O/P NEW HI 60 MIN: CPT | Performed by: PSYCHIATRY & NEUROLOGY

## 2021-12-28 RX ORDER — METHYLPHENIDATE HYDROCHLORIDE 5 MG/1
5 TABLET ORAL 2 TIMES DAILY
Qty: 60 TABLET | Refills: 0 | Status: SHIPPED | OUTPATIENT
Start: 2021-12-28 | End: 2022-01-13

## 2021-12-28 NOTE — PROGRESS NOTES
"INITIAL NEUROLOGY CONSULTATION    DATE OF VISIT: 12/28/2021  CLINIC LOCATION: [facility]  MRN: [id number]  PATIENT NAME: Westley Singh  YOB: 1970    PRIMARY CARE PROVIDER: Physician No Ref-Primary     REASON FOR VISIT:   Chief Complaint   Patient presents with     Neurologic Problem     chiari malformation found on CT after concussion, had headches previously but much worse and more frequent     HISTORY OF PRESENT ILLNESS:                                                    Mr. Westley Singh is 51 year old right handed male patient with past medical history of postconcussion syndrome, asthma, anxiety, insomnia, ADHD, and depression, who was seen in consultation today requested by Lien Mason PA-C, for Chiari type I malformation.  The patient is accompanied by his significant other, who participates in the interview today.    Per patient's report, he sustained head injury on 10/8/2021 with resultant concussion.  In the process of evaluation, brain imaging demonstrated Chiari type I malformation.  The patient continues to experience persistent headaches, cognitive difficulties (mainly short-term memory), tinnitus, reduced range of motion of his head.  He wakes up without feeling rested.  There is \"crunching\" noise at the base of his skull with head movements.  Denies any additional focal neurological symptoms.  Reports history of migraines approximately 1/month prior to his head injury.  Started on Wellbutrin and amitriptyline for headache prevention in concussion clinic.  Ritalin was recently added.  Takes Tylenol for acute therapy.  Also on lamotrigine.    Head CT from 10/15/2021 demonstrated low-lying cerebellar tonsils without acute intracranial pathology. Brain MRI from 10/20/2021, performed after head injury, demonstrated cerebellar tonsillar ectopia of 7 mm with partial effacement of CSF space at the level of foramen magnum.  Cervical spine MRI was recommended by " neuroradiology to assess for syrinx.  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  No other abnormal findings were seen.  Images were personally reviewed and independently interpreted.    No additional useful information is available in Care Everywhere, which was reviewed.    Review of Systems - the patient endorses insomnia, fatigue, asthma, hay fever, arthritis, thyroid problems, anxiety, depression, restlessness, headaches, memory problems, and sinus problems.  All of them have been previously discussed with other medical providers.  Otherwise, he denies any other complaints on 14-point comprehensive review of systems.  PAST MEDICAL/SURGICAL HISTORY:                                                    I personally reviewed patient's past medical and surgical history with the patient at today's visit.  MEDICATIONS:                                                    I personally reviewed patient's medications and allergies with the patient at today's visit.  albuterol (PROAIR HFA/PROVENTIL HFA/VENTOLIN HFA) 108 (90 Base) MCG/ACT inhaler, Inhale 2 puffs into the lungs every 6 hours as needed for shortness of breath / dyspnea  ALLEGRA 180 MG OR TABS, 1 tablet qd  amitriptyline (ELAVIL) 25 MG tablet, Take 1-2 tablets (25-50 mg) by mouth At Bedtime  amLODIPine (NORVASC) 2.5 MG tablet, Take 1 tablet (2.5 mg) by mouth daily  buPROPion (WELLBUTRIN XL) 300 MG 24 hr tablet, Take 1 tablet (300 mg) by mouth every morning  lamoTRIgine (LAMICTAL) 150 MG tablet, Take 150 mg by mouth 2 times daily (with meals)  methylphenidate (RITALIN) 5 MG tablet, Take 1 tablet (5 mg) by mouth 2 times daily    No current facility-administered medications on file prior to visit.    ALLERGIES:                                                      Allergies   Allergen Reactions     Aspirin      Cephalosporins      Penicillins      FAMILY/SOCIAL HISTORY:                                                    Family and social history was reviewed with  the patient at today's visit.  No family history of neurological disorders.  Former smoker, quit in 2016.  2+ alcohol drinks per day.  Denies recreational drug use.  , lives with partner and her child.  Works full-time as an .  REVIEW OF SYSTEMS:                                                    Patient has completed a [facility] Patient Health History, including a 14-system review, which was personally reviewed, and pertinent positives are listed in HPI. He denies any additional problems on the further questioning.  EXAM:                                                    VITAL SIGNS:   BP (!) 166/98 (BP Location: Right arm, Patient Position: Sitting, Cuff Size: Adult Regular)   Pulse 89   Wt 95.6 kg (210 lb 12.8 oz)   SpO2 99%   BMI 28.59 kg/m    Mini-Cog Assessment:  Mini Cog Assessment  Clock Draw Score: 2 Normal  3 Item Recall: 3 objects recalled  Mini Cog Total Score: 5  Administered by: : Roz REYNA    General: pt is in NAD, cooperative.  Skin: normal turgor, moist mucous membranes, no lesions/rashes noticed.  HEENT: ATNC, EOMI, PERRL, white sclera, normal conjunctiva, no nystagmus or ptosis. No carotid bruits bilaterally.  Respiratory: lung sounds clear to auscultation bilaterally, no crackles, wheezes, rhonchi. Symmetric lung excursion, no accessory respiratory muscle use.  Cardiovascular: normal S1/S2, no murmurs/rubs/gallops.   Abdomen: Not distended.  : deferred.    Neurological:  Mental: alert, follows commands, Mini Cog Total Score: 5/5 with 3/3 on memory recall, no aphasia or dysarthria. Fund of knowledge is appropriate for age.  Cranial Nerves:  CN II: visual acuity - able to accurately count fingers with each eye. Visual fields intact, fundi: discs sharp, no papilledema and normal vessels bilaterally.  CN III, IV, VI: EOM intact, pupils equal and reactive  CN V: facial sensation nl  CN VII: face symmetric, no facial droop  CN VIII: hearing normal  CN IX:  palate elevation symmetric, uvula at midline  CN XI SCM normal, shoulder shrug nl  CN XII: tongue midline  Motor: Strength: 5/5 in all major groups of all extremities. Normal tone. No abnormal movements. No pronator drift b/l.  Reflexes: Triceps, biceps, brachioradialis, patellar, and achilles reflexes normal and symmetric. No clonus noted. Toes are down-going b/l.   Sensory: light touch, pinprick, and vibration intact. Romberg: negative.  Coordination: FNF and heel-shin tests intact b/l.   Gait:  Normal, able to tandem walk mild difficulty.  DATA:   LABS/IMAGING/OTHER STUDIES: I reviewed pertinent medical records, including personal review of head CT/brain MRI, concussion clinic notes, and Care Everywhere, as detailed in the history of present illness.  ASSESSMENT AND PLAN:      ASSESSMENT: Westley Singh is a 51 year old male patient with listed above past medical history, who presents with incidentally found Chiari I malformation in the process of evaluation of his head injury.    We had a detailed discussion with the patient and his significant other regarding his presenting complaints.  The neurological exam today is noticeable for mild difficulty with tandem gait, but no other focal findings. We discussed that most likely his MRI finding is incidental, though given the noticeable worsening of headaches following head injury, I recommend to obtain phase-contrast cine MRI to evaluate for CSF flow obstruction and cervical spine MRI to evaluate for syrinx and other structural abnormalities that cause limited head movements and pain.  If both negative, would be inclined to observe over time while he recovers from postconcussive syndrome, but if symptoms are persistent might consider doing head and neck MRA to evaluate for dissection.    Westley to follow up with Primary Care provider regarding elevated blood pressure.     DIAGNOSES:    ICD-10-CM    1. Chiari I malformation (H)  G93.5 MR Cervical Spine w/o Contrast      Adult Neurology Referral     MR Brain w/o & w Contrast   2. Injury of head, subsequent encounter  S09.90XD MR Cervical Spine w/o Contrast     PLAN: At today's visit we thoroughly discussed current symptoms, brain MRI findings, necessary evaluation, and the plan, which includes:  Orders Placed This Encounter   Procedures     MR Cervical Spine w/o Contrast     MR Brain w/o & w Contrast     No new medications.    Next follow-up appointment is in the next 2-4 weeks or earlier if needed.    Total Time: 61 minutes spent on the date of the encounter doing chart review, history and exam, documentation and further activities per the note.    Odin Potter MD  [facility]  (Chart documentation was completed in part with Dragon voice-recognition software. Even though reviewed, some grammatical, spelling, and word errors may remain.)

## 2021-12-28 NOTE — LETTER
"    12/28/2021         RE: Westley Singh  [address]  [place] [address]        Dear Colleague,    Thank you for referring your patient, Westley Singh, to the [facility] [place]. Please see a copy of my visit note below.    INITIAL NEUROLOGY CONSULTATION    DATE OF VISIT: 12/28/2021  CLINIC LOCATION: [facility]  MRN: [id number]  PATIENT NAME: Westley Singh  YOB: 1970    PRIMARY CARE PROVIDER: Physician No Ref-Primary     REASON FOR VISIT:   Chief Complaint   Patient presents with     Neurologic Problem     chiari malformation found on CT after concussion, had headches previously but much worse and more frequent     HISTORY OF PRESENT ILLNESS:                                                    Mr. Westley Singh is 51 year old right handed male patient with past medical history of postconcussion syndrome, asthma, anxiety, insomnia, ADHD, and depression, who was seen in consultation today requested by Lien Mason PA-C, for Chiari type I malformation.  The patient is accompanied by his significant other, who participates in the interview today.    Per patient's report, he sustained head injury on 10/8/2021 with resultant concussion.  In the process of evaluation, brain imaging demonstrated Chiari type I malformation.  The patient continues to experience persistent headaches, cognitive difficulties (mainly short-term memory), tinnitus, reduced range of motion of his head.  He wakes up without feeling rested.  There is \"crunching\" noise at the base of his skull with head movements.  Denies any additional focal neurological symptoms.  Reports history of migraines approximately 1/month prior to his head injury.  Started on Wellbutrin and amitriptyline for headache prevention in concussion clinic.  Ritalin was recently added.  Takes Tylenol for acute therapy.  Also on lamotrigine.    Head CT from 10/15/2021 demonstrated low-lying cerebellar tonsils " without acute intracranial pathology. Brain MRI from 10/20/2021, performed after head injury, demonstrated cerebellar tonsillar ectopia of 7 mm with partial effacement of CSF space at the level of foramen magnum.  Cervical spine MRI was recommended by neuroradiology to assess for syrinx.  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  No other abnormal findings were seen.  Images were personally reviewed and independently interpreted.    No additional useful information is available in Care Everywhere, which was reviewed.    Review of Systems - the patient endorses insomnia, fatigue, asthma, hay fever, arthritis, thyroid problems, anxiety, depression, restlessness, headaches, memory problems, and sinus problems.  All of them have been previously discussed with other medical providers.  Otherwise, he denies any other complaints on 14-point comprehensive review of systems.  PAST MEDICAL/SURGICAL HISTORY:                                                    I personally reviewed patient's past medical and surgical history with the patient at today's visit.  MEDICATIONS:                                                    I personally reviewed patient's medications and allergies with the patient at today's visit.  albuterol (PROAIR HFA/PROVENTIL HFA/VENTOLIN HFA) 108 (90 Base) MCG/ACT inhaler, Inhale 2 puffs into the lungs every 6 hours as needed for shortness of breath / dyspnea  ALLEGRA 180 MG OR TABS, 1 tablet qd  amitriptyline (ELAVIL) 25 MG tablet, Take 1-2 tablets (25-50 mg) by mouth At Bedtime  amLODIPine (NORVASC) 2.5 MG tablet, Take 1 tablet (2.5 mg) by mouth daily  buPROPion (WELLBUTRIN XL) 300 MG 24 hr tablet, Take 1 tablet (300 mg) by mouth every morning  lamoTRIgine (LAMICTAL) 150 MG tablet, Take 150 mg by mouth 2 times daily (with meals)  methylphenidate (RITALIN) 5 MG tablet, Take 1 tablet (5 mg) by mouth 2 times daily    No current facility-administered medications on file prior to visit.    ALLERGIES:                                                       Allergies   Allergen Reactions     Aspirin      Cephalosporins      Penicillins      FAMILY/SOCIAL HISTORY:                                                    Family and social history was reviewed with the patient at today's visit.  No family history of neurological disorders.  Former smoker, quit in 2016.  2+ alcohol drinks per day.  Denies recreational drug use.  , lives with partner and her child.  Works full-time as an .  REVIEW OF SYSTEMS:                                                    Patient has completed a [facility] Patient Health History, including a 14-system review, which was personally reviewed, and pertinent positives are listed in HPI. He denies any additional problems on the further questioning.  EXAM:                                                    VITAL SIGNS:   BP (!) 166/98 (BP Location: Right arm, Patient Position: Sitting, Cuff Size: Adult Regular)   Pulse 89   Wt 95.6 kg (210 lb 12.8 oz)   SpO2 99%   BMI 28.59 kg/m    Mini-Cog Assessment:  Mini Cog Assessment  Clock Draw Score: 2 Normal  3 Item Recall: 3 objects recalled  Mini Cog Total Score: 5  Administered by: : Roz REYNA    General: pt is in NAD, cooperative.  Skin: normal turgor, moist mucous membranes, no lesions/rashes noticed.  HEENT: ATNC, EOMI, PERRL, white sclera, normal conjunctiva, no nystagmus or ptosis. No carotid bruits bilaterally.  Respiratory: lung sounds clear to auscultation bilaterally, no crackles, wheezes, rhonchi. Symmetric lung excursion, no accessory respiratory muscle use.  Cardiovascular: normal S1/S2, no murmurs/rubs/gallops.   Abdomen: Not distended.  : deferred.    Neurological:  Mental: alert, follows commands, Mini Cog Total Score: 5/5 with 3/3 on memory recall, no aphasia or dysarthria. Fund of knowledge is appropriate for age.  Cranial Nerves:  CN II: visual acuity - able to accurately count fingers with each  eye. Visual fields intact, fundi: discs sharp, no papilledema and normal vessels bilaterally.  CN III, IV, VI: EOM intact, pupils equal and reactive  CN V: facial sensation nl  CN VII: face symmetric, no facial droop  CN VIII: hearing normal  CN IX: palate elevation symmetric, uvula at midline  CN XI SCM normal, shoulder shrug nl  CN XII: tongue midline  Motor: Strength: 5/5 in all major groups of all extremities. Normal tone. No abnormal movements. No pronator drift b/l.  Reflexes: Triceps, biceps, brachioradialis, patellar, and achilles reflexes normal and symmetric. No clonus noted. Toes are down-going b/l.   Sensory: light touch, pinprick, and vibration intact. Romberg: negative.  Coordination: FNF and heel-shin tests intact b/l.   Gait:  Normal, able to tandem walk mild difficulty.  DATA:   LABS/IMAGING/OTHER STUDIES: I reviewed pertinent medical records, including personal review of head CT/brain MRI, concussion clinic notes, and Care Everywhere, as detailed in the history of present illness.  ASSESSMENT AND PLAN:      ASSESSMENT: Westley Singh is a 51 year old male patient with listed above past medical history, who presents with incidentally found Chiari I malformation in the process of evaluation of his head injury.    We had a detailed discussion with the patient and his significant other regarding his presenting complaints.  The neurological exam today is noticeable for mild difficulty with tandem gait, but no other focal findings. We discussed that most likely his MRI finding is incidental, though given the noticeable worsening of headaches following head injury, I recommend to obtain phase-contrast cine MRI to evaluate for CSF flow obstruction and cervical spine MRI to evaluate for syrinx and other structural abnormalities that cause limited head movements and pain.  If both negative, would be inclined to observe over time while he recovers from postconcussive syndrome, but if symptoms are persistent  might consider doing head and neck MRA to evaluate for dissection.    Westley to follow up with Primary Care provider regarding elevated blood pressure.     DIAGNOSES:    ICD-10-CM    1. Chiari I malformation (H)  G93.5 MR Cervical Spine w/o Contrast     Adult Neurology Referral     MR Brain w/o & w Contrast   2. Injury of head, subsequent encounter  S09.90XD MR Cervical Spine w/o Contrast     PLAN: At today's visit we thoroughly discussed current symptoms, brain MRI findings, necessary evaluation, and the plan, which includes:  Orders Placed This Encounter   Procedures     MR Cervical Spine w/o Contrast     MR Brain w/o & w Contrast     No new medications.    Next follow-up appointment is in the next 2-4 weeks or earlier if needed.    Total Time: 61 minutes spent on the date of the encounter doing chart review, history and exam, documentation and further activities per the note.    Odin Potter MD  [facility]  (Chart documentation was completed in part with Dragon voice-recognition software. Even though reviewed, some grammatical, spelling, and word errors may remain.)            Westley Singh is a 51 year old male who presents for:  Chief Complaint   Patient presents with     Neurologic Problem     chiari malformation found on CT after concussion, had headches previously but much worse and more frequent        Initial Vitals:  BP (!) 166/98 (BP Location: Right arm, Patient Position: Sitting, Cuff Size: Adult Regular)   Pulse 89   Wt 95.6 kg (210 lb 12.8 oz)   SpO2 99%   BMI 28.59 kg/m   Estimated body mass index is 28.59 kg/m  as calculated from the following:    Height as of 10/11/21: 1.829 m (6').    Weight as of this encounter: 95.6 kg (210 lb 12.8 oz).. Body surface area is 2.2 meters squared. BP completed using cuff size: wrist cuff    Nursing Comments:  Vitals retaken 2nd BP: 169/101 patient will follow up with PCP     Roz Tyson      Again, thank you for allowing me to  participate in the care of your patient.        Sincerely,        Odin Potter MD

## 2021-12-28 NOTE — PROGRESS NOTES
Westley Singh is a 51 year old male who presents for:  Chief Complaint   Patient presents with     Neurologic Problem     chiari malformation found on CT after concussion, had headches previously but much worse and more frequent        Initial Vitals:  BP (!) 166/98 (BP Location: Right arm, Patient Position: Sitting, Cuff Size: Adult Regular)   Pulse 89   Wt 95.6 kg (210 lb 12.8 oz)   SpO2 99%   BMI 28.59 kg/m   Estimated body mass index is 28.59 kg/m  as calculated from the following:    Height as of 10/11/21: 1.829 m (6').    Weight as of this encounter: 95.6 kg (210 lb 12.8 oz).. Body surface area is 2.2 meters squared. BP completed using cuff size: wrist cuff    Nursing Comments:  Vitals retaken 2nd BP: 169/101 patient will follow up with PCP     Roz Tyson

## 2021-12-30 ENCOUNTER — HOSPITAL ENCOUNTER (OUTPATIENT)
Dept: PHYSICAL THERAPY | Facility: CLINIC | Age: 51
Setting detail: THERAPIES SERIES
End: 2021-12-30
Attending: NURSE PRACTITIONER
Payer: COMMERCIAL

## 2021-12-30 PROCEDURE — 97112 NEUROMUSCULAR REEDUCATION: CPT | Mod: GP | Performed by: PHYSICAL THERAPIST

## 2021-12-31 ENCOUNTER — TELEPHONE (OUTPATIENT)
Dept: NEUROLOGY | Facility: CLINIC | Age: 51
End: 2021-12-31
Payer: COMMERCIAL

## 2021-12-31 NOTE — TELEPHONE ENCOUNTER
Spot opened on 1/12/2022 at 2pm and I called Westley as promised he was able to take the appointment. I also canceled the appointment for 1/24/2022

## 2022-01-04 ENCOUNTER — HOSPITAL ENCOUNTER (OUTPATIENT)
Dept: PHYSICAL THERAPY | Facility: CLINIC | Age: 52
Setting detail: THERAPIES SERIES
End: 2022-01-04
Attending: NURSE PRACTITIONER
Payer: COMMERCIAL

## 2022-01-04 PROCEDURE — 97112 NEUROMUSCULAR REEDUCATION: CPT | Mod: GP | Performed by: PHYSICAL THERAPIST

## 2022-01-07 ENCOUNTER — HOSPITAL ENCOUNTER (OUTPATIENT)
Dept: MRI IMAGING | Facility: CLINIC | Age: 52
End: 2022-01-07
Attending: PSYCHIATRY & NEUROLOGY
Payer: COMMERCIAL

## 2022-01-07 DIAGNOSIS — S09.90XD INJURY OF HEAD, SUBSEQUENT ENCOUNTER: ICD-10-CM

## 2022-01-07 DIAGNOSIS — G93.5 CHIARI I MALFORMATION (H): ICD-10-CM

## 2022-01-07 PROCEDURE — 70553 MRI BRAIN STEM W/O & W/DYE: CPT

## 2022-01-07 PROCEDURE — 255N000002 HC RX 255 OP 636: Performed by: PSYCHIATRY & NEUROLOGY

## 2022-01-07 PROCEDURE — 72141 MRI NECK SPINE W/O DYE: CPT

## 2022-01-07 PROCEDURE — A9585 GADOBUTROL INJECTION: HCPCS | Performed by: PSYCHIATRY & NEUROLOGY

## 2022-01-07 RX ORDER — GADOBUTROL 604.72 MG/ML
10 INJECTION INTRAVENOUS ONCE
Status: COMPLETED | OUTPATIENT
Start: 2022-01-07 | End: 2022-01-07

## 2022-01-07 RX ADMIN — GADOBUTROL 10 ML: 604.72 INJECTION INTRAVENOUS at 20:11

## 2022-01-12 ENCOUNTER — OFFICE VISIT (OUTPATIENT)
Dept: NEUROLOGY | Facility: CLINIC | Age: 52
End: 2022-01-12
Attending: PSYCHIATRY & NEUROLOGY
Payer: COMMERCIAL

## 2022-01-12 VITALS — OXYGEN SATURATION: 96 % | DIASTOLIC BLOOD PRESSURE: 94 MMHG | HEART RATE: 102 BPM | SYSTOLIC BLOOD PRESSURE: 150 MMHG

## 2022-01-12 DIAGNOSIS — G93.5 CHIARI I MALFORMATION (H): Primary | ICD-10-CM

## 2022-01-12 PROCEDURE — 99214 OFFICE O/P EST MOD 30 MIN: CPT | Performed by: PSYCHIATRY & NEUROLOGY

## 2022-01-12 PROCEDURE — G0463 HOSPITAL OUTPT CLINIC VISIT: HCPCS

## 2022-01-12 NOTE — LETTER
1/12/2022         RE: Westley Singh  [address]  [facility] [address]        Dear Colleague,    Thank you for referring your patient, Westley Singh, to the [facility] [place]. Please see a copy of my visit note below.    ESTABLISHED PATIENT NEUROLOGY NOTE    DATE OF VISIT: 1/12/2022  CLINIC LOCATION: [facility]  MRN: [id number]  PATIENT NAME: Westley Singh  YOB: 1970    PCP: RUI Dickinson CNP    REASON FOR VISIT:   Chief Complaint   Patient presents with     Follow Up     Review Imaging      SUBJECTIVE:                                                      HISTORY OF PRESENT ILLNESS: Patient is here to follow up regarding Chiari I malformation. Please refer to my initial note from 12/28/2021 for further information.    Since the last visit, the patient reports no significant changes in his symptoms.  Denies interval development of new focal neurological symptoms.    Brain MRI with and without contrast from 1/7/2021 demonstrated previously seen Chiari I malformation with 7 mm cerebellar tonsillar ectopia and crowding of structures at the foramen magnum with partial attenuation of CSF flow dorsally and intact bidirectional flow ventrally.  In addition, extensive paranasal sinus mucosal disease was noted.  Cervical spine MRI was negative for syrinx, but demonstrated multilevel degenerative changes with most prominent findings at C4-5 where there is moderate left neural foraminal stenosis, C6-7 (mild right and severe left neuroforaminal stenosis), and C5-6 (moderate right and moderate to severe left neuroforaminal stenosis).  Images were personally reviewed and independently interpreted.    On review of systems, patient endorses no other active complaints. Medications, allergies, family and social history were also reviewed. There are no changes reported by patient.  REVIEW OF SYSTEMS:                                                     10-system review was completed. Pertinent positives are included in HPI. The remainder of ROS is negative.  EXAM:                                                    Physical Exam:   Vitals: BP (!) 147/92 (BP Location: Left arm, Patient Position: Sitting, Cuff Size: Adult Regular)   Pulse 107   SpO2 97%     General: pt is in NAD, cooperative.  Skin: normal turgor, moist mucous membranes, no lesions/rashes noticed.  HEENT: ATNC, white sclera, normal conjunctiva.  Respiratory: Symmetric lung excursion, no accessory respiratory muscle use.  Abdomen: Non distended.  Neurological: awake, cooperative, follows commands, no exam changes compared to the initial visit.  ASSESSMENT AND PLAN:                                                    Assessment: 51-year-old male patient with incidentally found Chiari I malformation in the process of evaluation for his head injury presents for follow-up after the completion of recommended additional work-up.  His brain MRI demonstrated only partially affected CSF flow distally, but normal flow ventrally in both directions.  On my personal review (series 11), CSF flow appears to be within normal limits.  I also discussed the case with neuroradiologist to confirm.  Cervical spine MRI was negative for syrinx, but demonstrated multilevel degenerative changes.  We reviewed images together.  I do not think that we need to pursue any additional work-up at the present time.  The patient was advised to come back if he develops new neurological symptoms or his current symptoms do not improve over time.    Westley to follow up with Primary Care provider regarding elevated blood pressure.     Diagnoses:    ICD-10-CM    1. Chiari I malformation (H)  G93.5      Plan: At today's visit we thoroughly discussed brain/cervical spine MRI results and the plan.  No additional neurological testing is needed at this point.  I advised the patient to come back if he develops new neurological symptoms or his  symptoms do not improve over time.    No new medications.    Next follow-up appointment is on as needed basis.    Total Time: 31 minutes spent on the date of the encounter doing chart review, history and exam, documentation and further activities per the note.    Odin Potter MD  [facility] Neurology  (Chart documentation was completed in part with Dragon voice-recognition software. Even though reviewed, some grammatical, spelling, and word errors may remain.)    Westley Singh is a 51 year old male who presents for:  Chief Complaint   Patient presents with     Follow Up     Review Imaging         Initial Vitals:  BP (!) 150/94 (BP Location: Left arm, Patient Position: Sitting, Cuff Size: Adult Regular)   Pulse 102   SpO2 96%  Estimated body mass index is 28.59 kg/m  as calculated from the following:    Height as of 10/11/21: 1.829 m (6').    Weight as of 12/28/21: 95.6 kg (210 lb 12.8 oz).. There is no height or weight on file to calculate BSA. BP completed using cuff size: large    Nursing Comments: 2nd BP: 150/94, patient takes BP daily and said they noticed it going up this week will follow up with PCP     Roz Tyson      Again, thank you for allowing me to participate in the care of your patient.        Sincerely,        Odin Potter MD

## 2022-01-12 NOTE — PATIENT INSTRUCTIONS
AFTER VISIT SUMMARY (AVS):    At today's visit we thoroughly discussed brain/cervical spine MRI results and the plan.  No additional neurological testing is needed at this point.  Please come back if you develop new neurological symptoms or your symptoms do not improve over time.    No new medications.    Next follow-up appointment is on as needed basis.    Please do not hesitate to call me with any questions or concerns.    Thanks.

## 2022-01-12 NOTE — PROGRESS NOTES
ESTABLISHED PATIENT NEUROLOGY NOTE    DATE OF VISIT: 1/12/2022  CLINIC LOCATION: [facility]  MRN: [id number]  PATIENT NAME: Westley Singh  YOB: 1970    PCP: RUI Dickinson CNP    REASON FOR VISIT:   Chief Complaint   Patient presents with     Follow Up     Review Imaging      SUBJECTIVE:                                                      HISTORY OF PRESENT ILLNESS: Patient is here to follow up regarding Chiari I malformation. Please refer to my initial note from 12/28/2021 for further information.    Since the last visit, the patient reports no significant changes in his symptoms.  Denies interval development of new focal neurological symptoms.    Brain MRI with and without contrast from 1/7/2021 demonstrated previously seen Chiari I malformation with 7 mm cerebellar tonsillar ectopia and crowding of structures at the foramen magnum with partial attenuation of CSF flow dorsally and intact bidirectional flow ventrally.  In addition, extensive paranasal sinus mucosal disease was noted.  Cervical spine MRI was negative for syrinx, but demonstrated multilevel degenerative changes with most prominent findings at C4-5 where there is moderate left neural foraminal stenosis, C6-7 (mild right and severe left neuroforaminal stenosis), and C5-6 (moderate right and moderate to severe left neuroforaminal stenosis).  Images were personally reviewed and independently interpreted.    On review of systems, patient endorses no other active complaints. Medications, allergies, family and social history were also reviewed. There are no changes reported by patient.  REVIEW OF SYSTEMS:                                                    10-system review was completed. Pertinent positives are included in HPI. The remainder of ROS is negative.  EXAM:                                                    Physical Exam:   Vitals: BP (!) 147/92 (BP Location: Left arm, Patient Position: Sitting,  Cuff Size: Adult Regular)   Pulse 107   SpO2 97%     General: pt is in NAD, cooperative.  Skin: normal turgor, moist mucous membranes, no lesions/rashes noticed.  HEENT: ATNC, white sclera, normal conjunctiva.  Respiratory: Symmetric lung excursion, no accessory respiratory muscle use.  Abdomen: Non distended.  Neurological: awake, cooperative, follows commands, no exam changes compared to the initial visit.  ASSESSMENT AND PLAN:                                                    Assessment: 51-year-old male patient with incidentally found Chiari I malformation in the process of evaluation for his head injury presents for follow-up after the completion of recommended additional work-up.  His brain MRI demonstrated only partially affected CSF flow distally, but normal flow ventrally in both directions.  On my personal review (series 11), CSF flow appears to be within normal limits.  I also discussed the case with neuroradiologist to confirm.  Cervical spine MRI was negative for syrinx, but demonstrated multilevel degenerative changes.  We reviewed images together.  I do not think that we need to pursue any additional work-up at the present time.  The patient was advised to come back if he develops new neurological symptoms or his current symptoms do not improve over time.    Westley to follow up with Primary Care provider regarding elevated blood pressure.     Diagnoses:    ICD-10-CM    1. Chiari I malformation (H)  G93.5      Plan: At today's visit we thoroughly discussed brain/cervical spine MRI results and the plan.  No additional neurological testing is needed at this point.  I advised the patient to come back if he develops new neurological symptoms or his symptoms do not improve over time.    No new medications.    Next follow-up appointment is on as needed basis.    Total Time: 31 minutes spent on the date of the encounter doing chart review, history and exam, documentation and further activities per the  note.    Odin Potter MD  [facility] Neurology  (Chart documentation was completed in part with Dragon voice-recognition software. Even though reviewed, some grammatical, spelling, and word errors may remain.)

## 2022-01-12 NOTE — PROGRESS NOTES
Westley Singh is a 51 year old male who presents for:  Chief Complaint   Patient presents with     Follow Up     Review Imaging         Initial Vitals:  BP (!) 150/94 (BP Location: Left arm, Patient Position: Sitting, Cuff Size: Adult Regular)   Pulse 102   SpO2 96%  Estimated body mass index is 28.59 kg/m  as calculated from the following:    Height as of 10/11/21: 1.829 m (6').    Weight as of 12/28/21: 95.6 kg (210 lb 12.8 oz).. There is no height or weight on file to calculate BSA. BP completed using cuff size: large    Nursing Comments: 2nd BP: 150/94, patient takes BP daily and said they noticed it going up this week will follow up with PCP     Roz Tyson

## 2022-01-13 ENCOUNTER — VIRTUAL VISIT (OUTPATIENT)
Dept: NEUROLOGY | Facility: CLINIC | Age: 52
End: 2022-01-13
Payer: COMMERCIAL

## 2022-01-13 DIAGNOSIS — G47.00 INSOMNIA, UNSPECIFIED TYPE: ICD-10-CM

## 2022-01-13 DIAGNOSIS — R41.840 ATTENTION AND CONCENTRATION DEFICIT: ICD-10-CM

## 2022-01-13 DIAGNOSIS — F07.81 POST CONCUSSION SYNDROME: Primary | ICD-10-CM

## 2022-01-13 PROCEDURE — 99215 OFFICE O/P EST HI 40 MIN: CPT | Mod: GT | Performed by: NURSE PRACTITIONER

## 2022-01-13 RX ORDER — METHYLPHENIDATE HYDROCHLORIDE 27 MG/1
27 TABLET ORAL EVERY MORNING
Qty: 30 TABLET | Refills: 0 | Status: SHIPPED | OUTPATIENT
Start: 2022-01-13 | End: 2022-04-13 | Stop reason: DRUGHIGH

## 2022-01-13 RX ORDER — METHYLPHENIDATE HYDROCHLORIDE 5 MG/1
5 TABLET ORAL 2 TIMES DAILY
Qty: 60 TABLET | Refills: 0 | Status: SHIPPED | OUTPATIENT
Start: 2022-01-13 | End: 2022-01-13 | Stop reason: DRUGHIGH

## 2022-01-13 RX ORDER — AMITRIPTYLINE HYDROCHLORIDE 50 MG/1
50-100 TABLET ORAL AT BEDTIME
Qty: 60 TABLET | Refills: 3 | Status: SHIPPED | OUTPATIENT
Start: 2022-01-13 | End: 2022-05-26

## 2022-01-13 RX ORDER — METHYLPHENIDATE HYDROCHLORIDE 5 MG/1
5 TABLET ORAL 3 TIMES DAILY
Qty: 90 TABLET | Refills: 0 | Status: SHIPPED | OUTPATIENT
Start: 2022-01-13 | End: 2022-04-13 | Stop reason: DRUGHIGH
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
## 2022-01-14 DIAGNOSIS — F07.81 POST CONCUSSION SYNDROME: Primary | ICD-10-CM

## 2022-01-18 ENCOUNTER — HOSPITAL ENCOUNTER (OUTPATIENT)
Dept: PHYSICAL THERAPY | Facility: CLINIC | Age: 52
Setting detail: THERAPIES SERIES
End: 2022-01-18
Attending: NURSE PRACTITIONER
Payer: COMMERCIAL

## 2022-01-18 PROCEDURE — 97110 THERAPEUTIC EXERCISES: CPT | Mod: GP | Performed by: PHYSICAL THERAPIST

## 2022-01-18 PROCEDURE — 97750 PHYSICAL PERFORMANCE TEST: CPT | Mod: GP | Performed by: PHYSICAL THERAPIST

## 2022-01-25 ENCOUNTER — HOSPITAL ENCOUNTER (OUTPATIENT)
Dept: PHYSICAL THERAPY | Facility: CLINIC | Age: 52
Setting detail: THERAPIES SERIES
End: 2022-01-25
Attending: NURSE PRACTITIONER
Payer: COMMERCIAL

## 2022-01-25 PROCEDURE — 97112 NEUROMUSCULAR REEDUCATION: CPT | Mod: GP | Performed by: PHYSICAL THERAPIST

## 2022-01-25 PROCEDURE — 97110 THERAPEUTIC EXERCISES: CPT | Mod: GP | Performed by: PHYSICAL THERAPIST

## 2022-02-01 ENCOUNTER — HOSPITAL ENCOUNTER (OUTPATIENT)
Dept: PHYSICAL THERAPY | Facility: CLINIC | Age: 52
Setting detail: THERAPIES SERIES
End: 2022-02-01
Attending: NURSE PRACTITIONER
Payer: COMMERCIAL

## 2022-02-01 PROCEDURE — 97110 THERAPEUTIC EXERCISES: CPT | Mod: GP | Performed by: PHYSICAL THERAPIST

## 2022-02-01 PROCEDURE — 97112 NEUROMUSCULAR REEDUCATION: CPT | Mod: GP | Performed by: PHYSICAL THERAPIST

## 2022-02-01 PROCEDURE — 97140 MANUAL THERAPY 1/> REGIONS: CPT | Mod: GP | Performed by: PHYSICAL THERAPIST

## 2022-02-08 ENCOUNTER — HOSPITAL ENCOUNTER (OUTPATIENT)
Dept: PHYSICAL THERAPY | Facility: CLINIC | Age: 52
Setting detail: THERAPIES SERIES
End: 2022-02-08
Attending: NURSE PRACTITIONER
Payer: COMMERCIAL

## 2022-02-08 PROCEDURE — 97140 MANUAL THERAPY 1/> REGIONS: CPT | Mod: GP | Performed by: PHYSICAL THERAPIST

## 2022-02-08 PROCEDURE — 97112 NEUROMUSCULAR REEDUCATION: CPT | Mod: GP | Performed by: PHYSICAL THERAPIST

## 2022-02-11 ENCOUNTER — TELEPHONE (OUTPATIENT)
Dept: OPHTHALMOLOGY | Facility: CLINIC | Age: 52
End: 2022-02-11
Payer: COMMERCIAL

## 2022-02-15 ENCOUNTER — HOSPITAL ENCOUNTER (OUTPATIENT)
Dept: PHYSICAL THERAPY | Facility: CLINIC | Age: 52
Setting detail: THERAPIES SERIES
End: 2022-02-15
Attending: NURSE PRACTITIONER
Payer: COMMERCIAL

## 2022-02-15 PROCEDURE — 97112 NEUROMUSCULAR REEDUCATION: CPT | Mod: GP | Performed by: PHYSICAL THERAPIST

## 2022-02-15 PROCEDURE — 97750 PHYSICAL PERFORMANCE TEST: CPT | Mod: GP | Performed by: PHYSICAL THERAPIST

## 2022-02-16 ENCOUNTER — OFFICE VISIT (OUTPATIENT)
Dept: NEUROLOGY | Facility: CLINIC | Age: 52
End: 2022-02-16
Payer: COMMERCIAL

## 2022-02-16 ENCOUNTER — OFFICE VISIT (OUTPATIENT)
Dept: OPHTHALMOLOGY | Facility: CLINIC | Age: 52
End: 2022-02-16
Payer: COMMERCIAL

## 2022-02-16 VITALS — SYSTOLIC BLOOD PRESSURE: 135 MMHG | DIASTOLIC BLOOD PRESSURE: 84 MMHG | HEART RATE: 93 BPM

## 2022-02-16 DIAGNOSIS — G47.00 INSOMNIA, UNSPECIFIED TYPE: ICD-10-CM

## 2022-02-16 DIAGNOSIS — G44.309 POST-CONCUSSION HEADACHE: ICD-10-CM

## 2022-02-16 DIAGNOSIS — H52.4 PRESBYOPIA: ICD-10-CM

## 2022-02-16 DIAGNOSIS — F07.81 POST CONCUSSION SYNDROME: Primary | ICD-10-CM

## 2022-02-16 DIAGNOSIS — H53.143 PHOTOPHOBIA OF BOTH EYES: ICD-10-CM

## 2022-02-16 DIAGNOSIS — F07.81 POSTCONCUSSION SYNDROME: Primary | ICD-10-CM

## 2022-02-16 DIAGNOSIS — R41.840 ATTENTION AND CONCENTRATION DEFICIT: ICD-10-CM

## 2022-02-16 PROCEDURE — 92015 DETERMINE REFRACTIVE STATE: CPT | Performed by: OPTOMETRIST

## 2022-02-16 PROCEDURE — 92002 INTRM OPH EXAM NEW PATIENT: CPT | Performed by: OPTOMETRIST

## 2022-02-16 PROCEDURE — 99215 OFFICE O/P EST HI 40 MIN: CPT | Performed by: NURSE PRACTITIONER

## 2022-02-16 RX ORDER — TIZANIDINE 2 MG/1
2-4 TABLET ORAL 3 TIMES DAILY
Qty: 60 TABLET | Refills: 3 | Status: SHIPPED | OUTPATIENT
Start: 2022-02-16 | End: 2022-04-13

## 2022-02-16 RX ORDER — BUTALBITAL, ACETAMINOPHEN AND CAFFEINE 50; 325; 40 MG/1; MG/1; MG/1
2 TABLET ORAL EVERY 4 HOURS PRN
Qty: 20 TABLET | Refills: 0 | Status: SHIPPED | OUTPATIENT
Start: 2022-02-16 | End: 2022-05-24

## 2022-02-16 RX ORDER — METHYLPHENIDATE HYDROCHLORIDE 36 MG/1
36 TABLET ORAL EVERY MORNING
Qty: 30 TABLET | Refills: 0 | Status: SHIPPED | OUTPATIENT
Start: 2022-02-16 | End: 2022-03-20

## 2022-02-16 RX ORDER — METHYLPHENIDATE HYDROCHLORIDE 10 MG/1
10 TABLET ORAL 3 TIMES DAILY
Qty: 90 TABLET | Refills: 0 | Status: SHIPPED | OUTPATIENT
Start: 2022-02-16 | End: 2022-03-28

## 2022-02-16 ASSESSMENT — SLIT LAMP EXAM - LIDS
COMMENTS: 1+ MEIBOMIAN GLAND DYSFUNCTION
COMMENTS: 1+ MEIBOMIAN GLAND DYSFUNCTION

## 2022-02-16 ASSESSMENT — REFRACTION_MANIFEST
OS_ADD: +2.25
OD_AXIS: 080
OS_CYLINDER: +0.50
OD_SPHERE: -0.75
OS_AXIS: 115
OS_SPHERE: -1.00
OD_ADD: +2.25
OD_CYLINDER: +0.50

## 2022-02-16 ASSESSMENT — CUP TO DISC RATIO
OS_RATIO: 0.25
OD_RATIO: 0.25

## 2022-02-16 ASSESSMENT — REFRACTION_WEARINGRX
OD_AXIS: 072
OD_CYLINDER: +0.50
OS_SPHERE: -1.25
OD_SPHERE: -0.75
OS_CYLINDER: +0.50
OD_ADD: +2.00
OS_ADD: +2.00
OS_AXIS: 100
SPECS_TYPE: PAL

## 2022-02-16 ASSESSMENT — CONF VISUAL FIELD
METHOD: COUNTING FINGERS
OS_NORMAL: 1
OD_NORMAL: 1

## 2022-02-16 ASSESSMENT — VISUAL ACUITY
METHOD: SNELLEN - LINEAR
OD_CC: 20/20
OS_CC: 20/20
CORRECTION_TYPE: GLASSES

## 2022-02-16 ASSESSMENT — TONOMETRY
IOP_METHOD: ICARE
OD_IOP_MMHG: 9
OS_IOP_MMHG: 9

## 2022-02-16 ASSESSMENT — EXTERNAL EXAM - RIGHT EYE: OD_EXAM: NORMAL

## 2022-02-16 ASSESSMENT — EXTERNAL EXAM - LEFT EYE: OS_EXAM: NORMAL
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
## 2022-02-16 NOTE — PROGRESS NOTES
02/15/22 0900   Signing Clinician's Name / Credentials   Signing clinician's name / credentials Cinthya Bird PT   Functional Gait Assessment (KILEY Thompson, BREANNE Pablo, et al. (2004))   1. GAIT LEVEL SURFACE 3  (4.91)   2. CHANGE IN GAIT SPEED 3   3. GAIT WITH HORIZONTAL HEAD TURNS 3   4. GAIT WITH VERTICAL HEAD TURNS 3   5. GAIT AND PIVOT TURN 1   6. STEP OVER OBSTACLE 1   7. GAIT WITH NARROW BASE OF SUPPORT 1   8. GAIT WITH EYES CLOSED 2   9. AMBULATING BACKWARDS 2   10. STEPS 2   Total Functional Gait Assessment Score   TOTAL SCORE: (MAXIMUM SCORE 30) 21   Functional Gait Assessment (FGA): The FGA assesses postural stability during various walking tasks.   Gait assistive device used: None    Scores of <22 /30 have been correlated with predicting falls in community-dwelling older adults according to Donna & Delbert 2010.   Scores of <18 /30 have been correlated with increased risk for falls in patients with Parkinsons Disease according to Fermín Munoz, Carr et al 2014.  Minimal Detectable Change for patients with acute/chronic stroke = 4.2 according to Thichaka & Ritschel 2009  Minimal Detectable Change for patients with vestibular disorder = 8 according to Donna & Delbert 2010    Assessment (rationale for performing, application to patient s function & care plan): re-assessment of gait stability to determine progress and assist in care planning. Patient demonstrates reduced gait stability in comparison to time of evaluation with score of 21/30 compared to 24/30 at evaluation. Patient does improve on many of these challenges with repeated practice and cueing in the session. Reduced balance confidence appears to impact performance. He will benefit from additional skilled PT to improve confidence and reduce fall risk.  (Minutes billed as physical performance test): 10
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
## 2022-02-16 NOTE — PROGRESS NOTES
Assessment/Plan  (F07.81) Postconcussion syndrome  (primary encounter diagnosis)  Comment: Ocular health within normal limits. Normal binocular vision.   Plan: Discussed findings with patient. Given the slight refractive shift and improvement in near symptoms in office with Rx change, recommend that patient get new glasses to start. Recommend FL-41 tint to help with photosensitivity. Call or return to clinic if symptoms do not improve.     (H53.143) Photophobia of both eyes  Plan: See above note.     (H52.4) Presbyopia  Plan: REFRACTION [[id number]]        See above. SRx updated and released. Call or return to clinic with prolonged adaptation difficulties.         Complete documentation of historical and exam elements from today's encounter can  be found in the full encounter summary report (not reduplicated in this progress  note). I personally obtained the chief complaint(s) and history of present illness. I  confirmed and edited as necessary the review of systems, past medical/surgical  history, family history, social history, and examination findings as documented by  others; and I examined the patient myself. I personally reviewed the relevant tests,  images, and reports as documented above. I formulated and edited as necessary the  assessment and plan and discussed the findings and management plan with the  patient and family.    Rikki Saldaña OD

## 2022-02-16 NOTE — LETTER
2/16/2022       RE: Westley Singh  [address]  [facility] [address]     Dear Colleague,    Thank you for referring your patient, Westley Singh, to the [facility] - [place] [facility]. Please see a copy of my visit note below.    Assessment/Plan  (F07.81) Postconcussion syndrome  (primary encounter diagnosis)  Comment: Ocular health within normal limits. Normal binocular vision.   Plan: Discussed findings with patient. Given the slight refractive shift and improvement in near symptoms in office with Rx change, recommend that patient get new glasses to start. Recommend FL-41 tint to help with photosensitivity. Call or return to clinic if symptoms do not improve.     (H53.143) Photophobia of both eyes  Plan: See above note.     (H52.4) Presbyopia  Plan: REFRACTION [[id number]]        See above. SRx updated and released. Call or return to clinic with prolonged adaptation difficulties.         Complete documentation of historical and exam elements from today's encounter can  be found in the full encounter summary report (not reduplicated in this progress  note). I personally obtained the chief complaint(s) and history of present illness. I  confirmed and edited as necessary the review of systems, past medical/surgical  history, family history, social history, and examination findings as documented by  others; and I examined the patient myself. I personally reviewed the relevant tests,  images, and reports as documented above. I formulated and edited as necessary the  assessment and plan and discussed the findings and management plan with the  patient and family.    Rikki Saldaña OD       Again, thank you for allowing me to participate in the care of your patient.      Sincerely,    Rikki Saldaña OD

## 2022-03-03 ENCOUNTER — HOSPITAL ENCOUNTER (OUTPATIENT)
Dept: PHYSICAL THERAPY | Facility: CLINIC | Age: 52
Setting detail: THERAPIES SERIES
End: 2022-03-03
Attending: NURSE PRACTITIONER
Payer: COMMERCIAL

## 2022-03-03 PROCEDURE — 97750 PHYSICAL PERFORMANCE TEST: CPT | Mod: GP | Performed by: PHYSICAL THERAPIST

## 2022-03-03 PROCEDURE — 97112 NEUROMUSCULAR REEDUCATION: CPT | Mod: GP | Performed by: PHYSICAL THERAPIST

## 2022-03-03 PROCEDURE — 97110 THERAPEUTIC EXERCISES: CPT | Mod: GP | Performed by: PHYSICAL THERAPIST

## 2022-03-03 NOTE — PROGRESS NOTES
03/03/22 0800   Signing Clinician's Name / Credentials   Signing clinician's name / credentials Cinthya Bird PT   Functional Gait Assessment (KILEY Thompson, BREANNE Pablo, et al. (2004))   1. GAIT LEVEL SURFACE 3  (3.94)   2. CHANGE IN GAIT SPEED 3   3. GAIT WITH HORIZONTAL HEAD TURNS 3   4. GAIT WITH VERTICAL HEAD TURNS 3   5. GAIT AND PIVOT TURN 3   6. STEP OVER OBSTACLE 3   7. GAIT WITH NARROW BASE OF SUPPORT 1   8. GAIT WITH EYES CLOSED 3   9. AMBULATING BACKWARDS 3   10. STEPS 2   Total Functional Gait Assessment Score   TOTAL SCORE: (MAXIMUM SCORE 30) 27   Functional Gait Assessment (FGA): The FGA assesses postural stability during various walking tasks.   Gait assistive device used: None    Scores of <22 /30 have been correlated with predicting falls in community-dwelling older adults according to Donna & Delbert 2010.   Scores of <18 /30 have been correlated with increased risk for falls in patients with Parkinsons Disease according to Fermín Munoz, Carr et al 2014.  Minimal Detectable Change for patients with acute/chronic stroke = 4.2 according to Thichaka & Ritschel 2009  Minimal Detectable Change for patients with vestibular disorder = 8 according to Donna & Delbert 2010    Assessment (rationale for performing, application to patient s function & care plan): Re-assessment of gait stability following 2 weeks of HEP to address deficits in this area. Patient has made significant progress with increase in score from 21/30 to 27/30 with his HEP. He reports increased confidence with balance and walking since last visit.     (Minutes billed as physical performance test): 10

## 2022-03-16 DIAGNOSIS — F07.81 POST CONCUSSION SYNDROME: Primary | ICD-10-CM

## 2022-03-20 ENCOUNTER — MYC REFILL (OUTPATIENT)
Dept: NEUROLOGY | Facility: CLINIC | Age: 52
End: 2022-03-20
Payer: COMMERCIAL

## 2022-03-20 DIAGNOSIS — G44.309 POST-CONCUSSION HEADACHE: ICD-10-CM

## 2022-03-20 DIAGNOSIS — R41.840 ATTENTION AND CONCENTRATION DEFICIT: ICD-10-CM

## 2022-03-20 DIAGNOSIS — F07.81 POST CONCUSSION SYNDROME: ICD-10-CM

## 2022-03-20 RX ORDER — METHYLPHENIDATE HYDROCHLORIDE 36 MG/1
36 TABLET ORAL EVERY MORNING
Qty: 30 TABLET | Refills: 0 | Status: SHIPPED | OUTPATIENT
Start: 2022-03-20 | End: 2022-04-19

## 2022-03-28 ENCOUNTER — MYC REFILL (OUTPATIENT)
Dept: NEUROLOGY | Facility: CLINIC | Age: 52
End: 2022-03-28
Payer: COMMERCIAL

## 2022-03-28 DIAGNOSIS — F07.81 POST CONCUSSION SYNDROME: ICD-10-CM

## 2022-03-28 DIAGNOSIS — G44.309 POST-CONCUSSION HEADACHE: ICD-10-CM

## 2022-03-28 DIAGNOSIS — R41.840 ATTENTION AND CONCENTRATION DEFICIT: ICD-10-CM

## 2022-03-28 RX ORDER — METHYLPHENIDATE HYDROCHLORIDE 10 MG/1
10 TABLET ORAL 3 TIMES DAILY
Qty: 90 TABLET | Refills: 0 | Status: SHIPPED | OUTPATIENT
Start: 2022-03-28 | End: 2022-05-09

## 2022-04-01 ENCOUNTER — HOSPITAL ENCOUNTER (OUTPATIENT)
Dept: SPEECH THERAPY | Facility: CLINIC | Age: 52
Discharge: HOME | End: 2022-04-01
Payer: COMMERCIAL

## 2022-04-01 DIAGNOSIS — S06.0X0A CONCUSSION WITHOUT LOSS OF CONSCIOUSNESS, INITIAL ENCOUNTER: Primary | ICD-10-CM

## 2022-04-01 PROCEDURE — 96125 COGNITIVE TEST BY HC PRO: CPT | Mod: GN | Performed by: SPEECH-LANGUAGE PATHOLOGIST

## 2022-04-01 PROCEDURE — 97129 THER IVNTJ 1ST 15 MIN: CPT | Mod: GN | Performed by: SPEECH-LANGUAGE PATHOLOGIST

## 2022-04-01 NOTE — PROGRESS NOTES
Repeatable Battery for the Assessment of Neuropsychological Status Update   (RBANS  Update)  The Repeatable Battery for the Assessment of Neuropsychological Status Update (RBANS  Update) was administered to Westley Singh on 4/1/2022.  The RBANS Update was originally developed with a primary focus on assessment of dementia, and measures cognitive decline or improvement across these domains: immediate memory, visuospatial/constructional; language; attention; and delayed memory.  However, special group studies are available for Alzheimer's Disease, Vascular Dementia, HIV Dementia, Louisa's Disease, Parkinson's Disease, Depression, Schizophrenia, and Closed Head Injury.   The RBANS can be used by clinicians and neuropsychologists to help screen for deficits in acute-care settings; track recovery during rehabilitation; track progression of neurological disorders; and screen for neurocognitive status in adolescents.  This test is normed for ages 12-89.  The subtest normative data are presented in scaled score units that have a mean of 10 and a standard deviation of 3.          Total Score Scaled Score  Percentile Group       I.  Immediate memory    1.  List Learning   28  10 (average)   2.  Story Memory   19  11(average)  Immediate Memory Index Score  = 103    II.  Visuospatial/Constructional    3.  Figure copy   20  13 (high Average)  4.  Line Orientation   19     >75  Visuospatial/Constructional Index Score  = 121    III.  Language     5.  Picture Naming   10     51-75  6.  Semantic Fluency   21  10 (average)  Language Index Score = 99    IV.  Attention  7.  Digit Span     12  12 (high average)  8.  Coding     39  7 (low average)  Attention Index Score = 97    V.  Delayed Memory  9.  List recall    7     51-75  10. List Recognition   20     51-75  11. Story Recall   11  12 (high average)  12. Figure Recall   14  10 (average)  Delayed Memory Index Score = 105  Sum of Total Scores for Subtest 9+11+12 32    Sum of Index  Scores = 525  Total Scale Score = 106      INTERPRETATION OF TEST RESULTS: Pt scores fall within normal limits for his age group, he scored consistently across memory, visuospatial reasoning, language, and attention domains of cognition with scores in the low to high average range. While these results are not suggestive of a cognitive-linguistic impairment Pt did report and demonstrate mild to moderate increases in post concussion symptoms throughout testing, specifically an increased HA. Additionally, Pt reports impaired memory and concentration/focus are specifically impacting his ability to meet occupational needs.     TIME ADMINISTERING TEST: 22  TIME FOR INTERPRETATION AND PREPARATION OF REPORT: 25  TOTAL TIME: 47    Repeatable Battery for the Assessment of Neuropsychological Status Update (RBANS Update). Copyright   2012 [facility] [employer] Adapted and reproduced with permission. All rights reserved.

## 2022-04-04 DIAGNOSIS — H91.90 HEARING DISORDER, UNSPECIFIED LATERALITY: Primary | ICD-10-CM

## 2022-04-04 DIAGNOSIS — F07.81 POST CONCUSSION SYNDROME: ICD-10-CM
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
## 2022-04-06 ENCOUNTER — HOSPITAL ENCOUNTER (OUTPATIENT)
Dept: PHYSICAL THERAPY | Facility: CLINIC | Age: 52
Setting detail: THERAPIES SERIES
Discharge: HOME | End: 2022-04-06
Attending: NURSE PRACTITIONER
Payer: COMMERCIAL

## 2022-04-06 PROCEDURE — 97750 PHYSICAL PERFORMANCE TEST: CPT | Mod: GP | Performed by: PHYSICAL THERAPIST

## 2022-04-06 PROCEDURE — 97110 THERAPEUTIC EXERCISES: CPT | Mod: GP | Performed by: PHYSICAL THERAPIST

## 2022-04-06 PROCEDURE — 97140 MANUAL THERAPY 1/> REGIONS: CPT | Mod: GP | Performed by: PHYSICAL THERAPIST

## 2022-04-06 NOTE — PROGRESS NOTES
04/06/22 0900   Signing Clinician's Name / Credentials   Signing clinician's name / credentials Cinthya Bird PT   Functional Gait Assessment (KILEY Thompson., BREANNE Pablo, et al. (2004))   1. GAIT LEVEL SURFACE 3   2. CHANGE IN GAIT SPEED 3   3. GAIT WITH HORIZONTAL HEAD TURNS 3   4. GAIT WITH VERTICAL HEAD TURNS 3   5. GAIT AND PIVOT TURN 3   6. STEP OVER OBSTACLE 3   7. GAIT WITH NARROW BASE OF SUPPORT 2   8. GAIT WITH EYES CLOSED 3   9. AMBULATING BACKWARDS 3   10. STEPS 2   Total Functional Gait Assessment Score   TOTAL SCORE: (MAXIMUM SCORE 30) 28   Functional Gait Assessment (FGA): The FGA assesses postural stability during various walking tasks.   Gait assistive device used: None    Scores of <22 /30 have been correlated with predicting falls in community-dwelling older adults according to Donna & Delbert 2010.   Scores of <18 /30 have been correlated with increased risk for falls in patients with Parkinsons Disease according to Fermín Munoz, Carr et al 2014.  Minimal Detectable Change for patients with acute/chronic stroke = 4.2 according to Thichaka & Naelschel 2009  Minimal Detectable Change for patients with vestibular disorder = 8 according to Donna & Delbert 2010    Assessment (rationale for performing, application to patient s function & care plan): Re-assessment following month long break with HEP to evaluate progress and assist in care planning. Patient has made small gains in gait stability from last assessment, score increased from 27 to 28/30. Greatest gains had been demonstrated last month with improvement from 21/30, bringing patient out of fall risk category. He continues to report improved confidence with balance and walking over the past month without skilled intervention. Patient is progressing independently with his HEP and no longer requires skilled intervention.     (Minutes billed as physical performance test): 9

## 2022-04-08 ENCOUNTER — HOSPITAL ENCOUNTER (OUTPATIENT)
Dept: SPEECH THERAPY | Facility: CLINIC | Age: 52
Discharge: HOME | End: 2022-04-08
Payer: COMMERCIAL

## 2022-04-08 PROCEDURE — 97130 THER IVNTJ EA ADDL 15 MIN: CPT | Mod: GN | Performed by: SPEECH-LANGUAGE PATHOLOGIST

## 2022-04-08 PROCEDURE — 97129 THER IVNTJ 1ST 15 MIN: CPT | Mod: GN | Performed by: SPEECH-LANGUAGE PATHOLOGIST

## 2022-04-08 NOTE — PROGRESS NOTES
"[facility]    Outpatient Physical Therapy Discharge Note  Patient: Westley Singh  : 1970    Beginning/End Dates of Reporting Period:  21 to 22. Westley was seen a total of 10 times in this EOC for vestibular and balance rehab, manual therapy and therapeutic exercise for neck.    Referring Provider: Chrissy Saezn APRN CNP    Therapy Diagnosis: s/s post concussion syndrome, neck pain     Client Self Report: Increase in Ritilin seems to have contributed to improvement in symtpoms. Balance has been ok. Brief, mild dizziness at times if he gets up quickly. He continues to have constant HA. Was rated 2/6 this AM when he got up and has improved to 1/6 at this point. He is managing HA with Tylenol every 4-6 hours.     Objective Measurements:  Objective Measure: CROM  Details: rotation 50B (improved from 47R and 38L 1 month ago). SB 13R (increased to 24 after stretches), 25L    Objective Measure: mCTSIB  Details: 30\" all conditions, normal balance reactions.     Objective Measure: FGA  Details: 28/30 improved from 21/30 on 2/15/22 and 27/30 on 3/3/22. no longer indicates increased fall risk.      DVA- 2 line loss from static, WNL. Westley denies dizziness in busy visual environments.    Outcome Measures (most recent score):  Concussion Symptom Assessment (score out of 90). A higher score indicates greater impairment: 10    Goals:  Goal Identifier Neck   Goal Description Westley will be able to move his neck into B rotation to at least 60 degrees without increased pain to be able to check his blind spots while driving without restriction for greater safety.    Target Date 22   Date Met      Progress (detail required for progress note): steady progress, improved 50B. pt reports he can tell he has more motion with rotation when driving.      Goal Identifier Transitional movements   Goal Description Westley " will be able to perform all transfers and changes of position ie off his knees or out of chairs without sense of dizziness or instability for greater safety moving about his home.   Target Date 02/18/22   Date Met  03/03/22   Progress (detail required for progress note): This is true most of the time. He has the occasional, mild sense of dizziness if gets up quickly from chair     Goal Identifier FGA   Goal Description Westley will score at least 29/30 on FGA indicating normal gait stability and reduced fall risk while walking in the community or taking the dog for a walk.    Target Date 02/18/22   Date Met      Progress (detail required for progress note): improved 28/30, still struggles mildly with tandem walking and holds railing on stairs; otherwise WNL, no increased fall risk     Goal Identifier VOR/DVA   Goal Description Patient will have normal DVA at 2 hz head movements (no greater than 2 line loss from static) to be able to walk and talk thru busy visual environments without feeling of dizziness or instability.    Target Date 02/18/22   Date Met  02/15/22   Progress (detail required for progress note): 20/20 static to 20/30 2 hz     Westley has made good progress in PT in regards to vestibular ocular function, postural and gait stability. He is not at an increased fall risk. His balance reactions are normal and effective. Neck pain is reduced and ROM improved. He is independent in HEP and demonstrated the ability to continue progressing over this past month with HEP and no skilled intervention. He will be able to continue to work towards return to PLOF with HEP and is discharged.    Plan:  Discharge from therapy.    Discharge:    Reason for Discharge: Patient has met goals and is able to continue to make progress with indep HEP.    Discharge Plan: Patient to continue home program.

## 2022-04-12 ENCOUNTER — HOSPITAL ENCOUNTER (OUTPATIENT)
Dept: OCCUPATIONAL THERAPY | Facility: CLINIC | Age: 52
Discharge: HOME | End: 2022-04-12
Attending: NURSE PRACTITIONER
Payer: COMMERCIAL

## 2022-04-12 DIAGNOSIS — S06.0X0A CONCUSSION WITHOUT LOSS OF CONSCIOUSNESS, INITIAL ENCOUNTER: ICD-10-CM

## 2022-04-12 DIAGNOSIS — Z78.9 IMPAIRED INSTRUMENTAL ACTIVITIES OF DAILY LIVING (IADL): ICD-10-CM

## 2022-04-12 DIAGNOSIS — Z78.9 DECREASED ACTIVITIES OF DAILY LIVING (ADL): Primary | ICD-10-CM

## 2022-04-12 PROCEDURE — 97110 THERAPEUTIC EXERCISES: CPT | Mod: GO | Performed by: OCCUPATIONAL THERAPIST

## 2022-04-12 PROCEDURE — 97535 SELF CARE MNGMENT TRAINING: CPT | Mod: GO | Performed by: OCCUPATIONAL THERAPIST

## 2022-04-12 PROCEDURE — 97165 OT EVAL LOW COMPLEX 30 MIN: CPT | Mod: GO | Performed by: OCCUPATIONAL THERAPIST

## 2022-04-13 ENCOUNTER — HOSPITAL ENCOUNTER (OUTPATIENT)
Dept: SPEECH THERAPY | Facility: CLINIC | Age: 52
Discharge: HOME | End: 2022-04-13
Payer: COMMERCIAL

## 2022-04-13 ENCOUNTER — OFFICE VISIT (OUTPATIENT)
Dept: NEUROLOGY | Facility: CLINIC | Age: 52
End: 2022-04-13

## 2022-04-13 ENCOUNTER — LAB (OUTPATIENT)
Dept: LAB | Facility: CLINIC | Age: 52
End: 2022-04-13
Attending: NURSE PRACTITIONER
Payer: COMMERCIAL

## 2022-04-13 ENCOUNTER — TRANSCRIBE ORDERS (OUTPATIENT)
Dept: CARDIOLOGY | Facility: CLINIC | Age: 52
End: 2022-04-13

## 2022-04-13 ENCOUNTER — TRANSCRIBE ORDERS (OUTPATIENT)
Dept: NEUROLOGY | Facility: CLINIC | Age: 52
End: 2022-04-13

## 2022-04-13 ENCOUNTER — HOSPITAL ENCOUNTER (OUTPATIENT)
Dept: CARDIOLOGY | Facility: CLINIC | Age: 52
Discharge: HOME | End: 2022-04-13
Attending: NURSE PRACTITIONER
Payer: COMMERCIAL

## 2022-04-13 DIAGNOSIS — F07.81 POST CONCUSSION SYNDROME: ICD-10-CM

## 2022-04-13 DIAGNOSIS — R00.0 TACHYCARDIA: ICD-10-CM

## 2022-04-13 DIAGNOSIS — I10 BENIGN ESSENTIAL HYPERTENSION: Primary | ICD-10-CM

## 2022-04-13 DIAGNOSIS — I10 BENIGN ESSENTIAL HYPERTENSION: ICD-10-CM

## 2022-04-13 DIAGNOSIS — R94.31 ABNORMAL ELECTROCARDIOGRAM: Primary | ICD-10-CM

## 2022-04-13 DIAGNOSIS — F07.81 POST CONCUSSION SYNDROME: Primary | ICD-10-CM

## 2022-04-13 DIAGNOSIS — R41.840 ATTENTION AND CONCENTRATION DEFICIT: ICD-10-CM

## 2022-04-13 LAB
ALBUMIN SERPL-MCNC: 4.1 G/DL (ref 3.5–5)
ALP SERPL-CCNC: 44 U/L (ref 45–120)
ALT SERPL W P-5'-P-CCNC: 19 U/L (ref 0–45)
ANION GAP SERPL CALCULATED.3IONS-SCNC: 12 MMOL/L (ref 5–18)
AST SERPL W P-5'-P-CCNC: 20 U/L (ref 0–40)
ATRIAL RATE - MUSE: 92 BPM
BASOPHILS # BLD AUTO: 0 10E3/UL (ref 0–0.2)
BASOPHILS NFR BLD AUTO: 1 %
BILIRUB SERPL-MCNC: 0.5 MG/DL (ref 0–1)
BUN SERPL-MCNC: 14 MG/DL (ref 8–22)
C REACTIVE PROTEIN LHE: 0.2 MG/DL (ref 0–0.8)
CALCIUM SERPL-MCNC: 9.6 MG/DL (ref 8.5–10.5)
CHLORIDE BLD-SCNC: 103 MMOL/L (ref 98–107)
CO2 SERPL-SCNC: 25 MMOL/L (ref 22–31)
CREAT SERPL-MCNC: 0.82 MG/DL (ref 0.7–1.3)
DIASTOLIC BLOOD PRESSURE - MUSE: NORMAL MMHG
EOSINOPHIL # BLD AUTO: 0.2 10E3/UL (ref 0–0.7)
EOSINOPHIL NFR BLD AUTO: 3 %
ERYTHROCYTE [DISTWIDTH] IN BLOOD BY AUTOMATED COUNT: 12.3 % (ref 10–15)
ERYTHROCYTE [SEDIMENTATION RATE] IN BLOOD BY WESTERGREN METHOD: 6 MM/HR (ref 0–15)
GFR SERPL CREATININE-BSD FRML MDRD: >90 ML/MIN/1.73M2
GLUCOSE BLD-MCNC: 102 MG/DL (ref 70–125)
HCT VFR BLD AUTO: 43 % (ref 40–53)
HGB BLD-MCNC: 14.2 G/DL (ref 13.3–17.7)
IMM GRANULOCYTES # BLD: 0 10E3/UL
IMM GRANULOCYTES NFR BLD: 0 %
INTERPRETATION ECG - MUSE: NORMAL
LYMPHOCYTES # BLD AUTO: 1.8 10E3/UL (ref 0.8–5.3)
LYMPHOCYTES NFR BLD AUTO: 30 %
MAGNESIUM SERPL-MCNC: 2 MG/DL (ref 1.8–2.6)
MCH RBC QN AUTO: 33.3 PG (ref 26.5–33)
MCHC RBC AUTO-ENTMCNC: 33 G/DL (ref 31.5–36.5)
MCV RBC AUTO: 101 FL (ref 78–100)
MONOCYTES # BLD AUTO: 0.5 10E3/UL (ref 0–1.3)
MONOCYTES NFR BLD AUTO: 8 %
NEUTROPHILS # BLD AUTO: 3.5 10E3/UL (ref 1.6–8.3)
NEUTROPHILS NFR BLD AUTO: 58 %
NRBC # BLD AUTO: 0 10E3/UL
NRBC BLD AUTO-RTO: 0 /100
P AXIS - MUSE: 30 DEGREES
PLATELET # BLD AUTO: 257 10E3/UL (ref 150–450)
POTASSIUM BLD-SCNC: 4.5 MMOL/L (ref 3.5–5)
PR INTERVAL - MUSE: 162 MS
PROT SERPL-MCNC: 7.1 G/DL (ref 6–8)
QRS DURATION - MUSE: 96 MS
QT - MUSE: 352 MS
QTC - MUSE: 435 MS
R AXIS - MUSE: -19 DEGREES
RBC # BLD AUTO: 4.27 10E6/UL (ref 4.4–5.9)
SODIUM SERPL-SCNC: 140 MMOL/L (ref 136–145)
SYSTOLIC BLOOD PRESSURE - MUSE: NORMAL MMHG
T AXIS - MUSE: 49 DEGREES
VENTRICULAR RATE- MUSE: 92 BPM
WBC # BLD AUTO: 5.9 10E3/UL (ref 4–11)

## 2022-04-13 PROCEDURE — 80053 COMPREHEN METABOLIC PANEL: CPT

## 2022-04-13 PROCEDURE — 93005 ELECTROCARDIOGRAM TRACING: CPT

## 2022-04-13 PROCEDURE — 82607 VITAMIN B-12: CPT

## 2022-04-13 PROCEDURE — 93005 ELECTROCARDIOGRAM TRACING: CPT | Performed by: NURSE PRACTITIONER

## 2022-04-13 PROCEDURE — 97130 THER IVNTJ EA ADDL 15 MIN: CPT | Mod: GN | Performed by: SPEECH-LANGUAGE PATHOLOGIST

## 2022-04-13 PROCEDURE — 93005 ELECTROCARDIOGRAM TRACING: CPT | Mod: 76 | Performed by: NURSE PRACTITIONER

## 2022-04-13 PROCEDURE — 82306 VITAMIN D 25 HYDROXY: CPT

## 2022-04-13 PROCEDURE — 85025 COMPLETE CBC W/AUTO DIFF WBC: CPT

## 2022-04-13 PROCEDURE — 86140 C-REACTIVE PROTEIN: CPT

## 2022-04-13 PROCEDURE — 97129 THER IVNTJ 1ST 15 MIN: CPT | Mod: GN | Performed by: SPEECH-LANGUAGE PATHOLOGIST

## 2022-04-13 PROCEDURE — 83735 ASSAY OF MAGNESIUM: CPT

## 2022-04-13 PROCEDURE — 93010 ELECTROCARDIOGRAM REPORT: CPT | Performed by: INTERNAL MEDICINE

## 2022-04-13 PROCEDURE — 36415 COLL VENOUS BLD VENIPUNCTURE: CPT

## 2022-04-13 PROCEDURE — 99215 OFFICE O/P EST HI 40 MIN: CPT | Performed by: NURSE PRACTITIONER

## 2022-04-13 PROCEDURE — 85652 RBC SED RATE AUTOMATED: CPT

## 2022-04-13 RX ORDER — LISDEXAMFETAMINE DIMESYLATE 40 MG/1
40 CAPSULE ORAL EVERY MORNING
Qty: 40 CAPSULE | Refills: 0 | Status: SHIPPED | OUTPATIENT
Start: 2022-04-13 | End: 2022-05-24 | Stop reason: DRUGHIGH
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
## 2022-04-14 LAB
DEPRECATED CALCIDIOL+CALCIFEROL SERPL-MC: 36 UG/L (ref 20–75)
VIT B12 SERPL-MCNC: 647 PG/ML (ref 213–816)
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
## 2022-04-18 NOTE — PROGRESS NOTES
"CARDIOLOGY CONSULT    REASON FOR CONSULT: Abnormal EKG    PRIMARY CARE PHYSICIAN:  Abby Fuentes    HISTORY OF PRESENT ILLNESS:  51-year-old male seen for abnormal EKG.  He has hypertension, asthma, postconcussion syndrome.    At baseline he does some light to at most moderate activity.  He has been following with neurology for the past 6 months due to concussion related symptoms.  He has been on amlodipine for about 1 year.  Blood pressure runs 130-150s over 80s-90s.  Heart rate has been in the 90s the past few months, historically it has been more in the 70s to 80s.  He denies any chest pain or dyspnea with exertion.    His neurologist did an EKG recently, they report stated septal infarct.    PAST MEDICAL HISTORY:  Past Medical History:   Diagnosis Date     Anxiety      Asthma      Depression      Depressive disorder      Hypertension 2009    On the high end of \"normal\" 120/80s     Kidney infection 2009    Hospitalized x 2     Thyroid disease     Hashimoto's diagnosis 2001       MEDICATIONS:  Current Outpatient Medications   Medication     albuterol (PROAIR HFA/PROVENTIL HFA/VENTOLIN HFA) 108 (90 Base) MCG/ACT inhaler     ALLEGRA 180 MG OR TABS     amitriptyline (ELAVIL) 50 MG tablet     amLODIPine (NORVASC) 2.5 MG tablet     buPROPion (WELLBUTRIN XL) 300 MG 24 hr tablet     butalbital-acetaminophen-caffeine (ESGIC) -40 MG tablet     lamoTRIgine (LAMICTAL) 150 MG tablet     lisdexamfetamine (VYVANSE) 40 MG capsule     methylphenidate (CONCERTA) 36 MG CR tablet     methylphenidate (RITALIN) 10 MG tablet     No current facility-administered medications for this visit.       ALLERGIES:  Allergies   Allergen Reactions     Aspirin      Cephalosporins      Penicillins        SOCIAL HISTORY:  I have reviewed this patient's social history and updated it with pertinent information if needed. Westley BATISTA Samantha  reports that he quit smoking about 5 years ago. His smoking use included cigars. He started smoking about 15 " years ago. He smoked 0.00 packs per day for 5.00 years. He has never used smokeless tobacco. He reports current alcohol use. He reports that he does not use drugs.    FAMILY HISTORY:  I have reviewed this patient's family history and updated it with pertinent information if needed.   Family History   Problem Relation Age of Onset     Hypertension Mother      Thyroid Disease Mother         Graves Disease / Hyperactive     Heart Disease Father      Diabetes Son        REVIEW OF SYSTEMS:  Constitutional:  No weight loss, fever, chills  HEENT:  Eyes:  No visual loss, blurred vision, double vision or yellow sclerae. No hearing loss, sneezing, congestion, runny nose or sore throat.  Skin:  No rash or itching.  Cardiovascular: per HPI  Respiratory: per HPI  GI:  No anorexia, nausea, vomiting or diarrhea. No abdominal pain or blood.  :  No dysurea, hematuria  Neurologic:  No headache, paralysis, ataxia, numbness or tingling in the extremities. No change in bowel or bladder control.  Musculoskeletal:  No muscle pain  Hematologic:  No bleeding or bruising.  Lymphatics:  No enlarged nodes. No history of splenectomy.  Endocrine:  No reports of sweating, cold or heat intolerance. No polyuria or polydipsia.  Allergies:  No history of asthma, hives, eczema or rhinitis.    PHYSICAL EXAM:  /88   Pulse 101   Ht 1.829 m (6')   Wt 96.3 kg (212 lb 4.8 oz)   SpO2 97%   BMI 28.79 kg/m      Constitutional: awake, alert, no distress  Eyes: PERRL, sclera nonicteric  ENT: trachea midline  Respiratory: Lungs clear  Cardiovascular: Regular rate and rhythm, no murmurs  GI: nondistended, nontender, bowel sounds present  Lymph/Hematologic: no lymphadenopathy  Skin: dry, no rash  Musculoskeletal: good muscle tone, strength 5/5 in upper and lower extremities  Neurologic: no focal deficits  Neuropsychiatric: appropriate affact    DATA:  Labs:   Recent Labs   Lab Test 03/24/21  0924   CHOL 150   HDL 65   LDL 75   TRIG 48   April 2022:  Potassium 4.5, creatinine 0.8    EKG, April 13, 2022: Sinus rhythm, rate 92, normal axis and intervals    ASSESSMENT:  51-year-old male seen for abnormal EKG.  His EKG is probably normal.  The computer did report septal infarct, however there are no convincing septal Q waves.  He has no anginal type symptoms.  He is hypertensive, blood pressure is still running high.  Heart rate has also been a little high in the past few months, this could be from the Ritalin.  Echo will be done to rule out any hypertensive related heart disease and ensure there is no anteroseptal wall motion abnormality.    RECOMMENDATIONS:  1.  Abnormal EKG  -Echocardiogram    2.  Hypertension, uncontrolled  -Increase amlodipine to 5 mg daily, goal blood pressure 130/80 or less  -Next step would be adding diuretic or lisinopril    Follow-up as needed based on echo result.    Maynor Guido MD  Cardiology - [facility] [place] [facility]  Pager:  [phone]  Text Page  April 20, 2022

## 2022-04-19 ENCOUNTER — MYC REFILL (OUTPATIENT)
Dept: NEUROLOGY | Facility: CLINIC | Age: 52
End: 2022-04-19
Payer: COMMERCIAL

## 2022-04-19 DIAGNOSIS — G44.309 POST-CONCUSSION HEADACHE: ICD-10-CM

## 2022-04-19 DIAGNOSIS — F07.81 POST CONCUSSION SYNDROME: ICD-10-CM

## 2022-04-19 DIAGNOSIS — R41.840 ATTENTION AND CONCENTRATION DEFICIT: ICD-10-CM

## 2022-04-20 ENCOUNTER — HOSPITAL ENCOUNTER (OUTPATIENT)
Dept: SPEECH THERAPY | Facility: CLINIC | Age: 52
Discharge: HOME | End: 2022-04-20
Payer: COMMERCIAL

## 2022-04-20 ENCOUNTER — OFFICE VISIT (OUTPATIENT)
Dept: CARDIOLOGY | Facility: CLINIC | Age: 52
End: 2022-04-20
Attending: NURSE PRACTITIONER
Payer: COMMERCIAL

## 2022-04-20 VITALS
HEIGHT: 72 IN | WEIGHT: 212.3 LBS | SYSTOLIC BLOOD PRESSURE: 132 MMHG | DIASTOLIC BLOOD PRESSURE: 88 MMHG | HEART RATE: 101 BPM | OXYGEN SATURATION: 97 % | BODY MASS INDEX: 28.76 KG/M2

## 2022-04-20 DIAGNOSIS — R00.0 TACHYCARDIA: ICD-10-CM

## 2022-04-20 DIAGNOSIS — I10 BENIGN ESSENTIAL HYPERTENSION: ICD-10-CM

## 2022-04-20 DIAGNOSIS — R94.31 ABNORMAL ELECTROCARDIOGRAM: ICD-10-CM

## 2022-04-20 PROCEDURE — 97130 THER IVNTJ EA ADDL 15 MIN: CPT | Mod: GN | Performed by: SPEECH-LANGUAGE PATHOLOGIST

## 2022-04-20 PROCEDURE — 99204 OFFICE O/P NEW MOD 45 MIN: CPT | Performed by: INTERNAL MEDICINE

## 2022-04-20 PROCEDURE — 97129 THER IVNTJ 1ST 15 MIN: CPT | Mod: GN | Performed by: SPEECH-LANGUAGE PATHOLOGIST

## 2022-04-20 RX ORDER — AMLODIPINE BESYLATE 5 MG/1
5 TABLET ORAL DAILY
Qty: 90 TABLET | Refills: 3 | Status: SHIPPED | OUTPATIENT
Start: 2022-04-20 | End: 2023-03-08

## 2022-04-20 RX ORDER — METHYLPHENIDATE HYDROCHLORIDE 36 MG/1
36 TABLET ORAL EVERY MORNING
Qty: 30 TABLET | Refills: 0 | Status: SHIPPED | OUTPATIENT
Start: 2022-04-20 | End: 2022-05-24 | Stop reason: ALTCHOICE

## 2022-04-20 NOTE — PATIENT INSTRUCTIONS
Increase amlodipine to 5mg once daily.    Echocardiogram  Will schedule an echocardiogram, or ultrasound of the heart.  This looks at the size and function of the heart and valves.  It generally takes about 20-30 minutes.  This will tell if there is any reduced heart function or problem with any of the valves.

## 2022-04-20 NOTE — LETTER
"4/20/2022    Abby Fuentes, APRN CNP  [address]  [facility] [address]    RE: Westley Singh       Dear Colleague,     I had the pleasure of seeing Westley BATISTA Samantha in the [facility].  CARDIOLOGY CONSULT    REASON FOR CONSULT: Abnormal EKG    PRIMARY CARE PHYSICIAN:  Abby Fuentes    HISTORY OF PRESENT ILLNESS:  51-year-old male seen for abnormal EKG.  He has hypertension, asthma, postconcussion syndrome.    At baseline he does some light to at most moderate activity.  He has been following with neurology for the past 6 months due to concussion related symptoms.  He has been on amlodipine for about 1 year.  Blood pressure runs 130-150s over 80s-90s.  Heart rate has been in the 90s the past few months, historically it has been more in the 70s to 80s.  He denies any chest pain or dyspnea with exertion.    His neurologist did an EKG recently, they report stated septal infarct.    PAST MEDICAL HISTORY:  Past Medical History:   Diagnosis Date     Anxiety      Asthma      Depression      Depressive disorder      Hypertension 2009    On the high end of \"normal\" 120/80s     Kidney infection 2009    Hospitalized x 2     Thyroid disease     Hashimoto's diagnosis 2001       MEDICATIONS:  Current Outpatient Medications   Medication     albuterol (PROAIR HFA/PROVENTIL HFA/VENTOLIN HFA) 108 (90 Base) MCG/ACT inhaler     ALLEGRA 180 MG OR TABS     amitriptyline (ELAVIL) 50 MG tablet     amLODIPine (NORVASC) 2.5 MG tablet     buPROPion (WELLBUTRIN XL) 300 MG 24 hr tablet     butalbital-acetaminophen-caffeine (ESGIC) -40 MG tablet     lamoTRIgine (LAMICTAL) 150 MG tablet     lisdexamfetamine (VYVANSE) 40 MG capsule     methylphenidate (CONCERTA) 36 MG CR tablet     methylphenidate (RITALIN) 10 MG tablet     No current facility-administered medications for this visit.       ALLERGIES:  Allergies   Allergen Reactions     Aspirin      Cephalosporins      Penicillins        SOCIAL HISTORY:  I have " reviewed this patient's social history and updated it with pertinent information if needed. Westley Singh  reports that he quit smoking about 5 years ago. His smoking use included cigars. He started smoking about 15 years ago. He smoked 0.00 packs per day for 5.00 years. He has never used smokeless tobacco. He reports current alcohol use. He reports that he does not use drugs.    FAMILY HISTORY:  I have reviewed this patient's family history and updated it with pertinent information if needed.   Family History   Problem Relation Age of Onset     Hypertension Mother      Thyroid Disease Mother         Graves Disease / Hyperactive     Heart Disease Father      Diabetes Son        REVIEW OF SYSTEMS:  Constitutional:  No weight loss, fever, chills  HEENT:  Eyes:  No visual loss, blurred vision, double vision or yellow sclerae. No hearing loss, sneezing, congestion, runny nose or sore throat.  Skin:  No rash or itching.  Cardiovascular: per HPI  Respiratory: per HPI  GI:  No anorexia, nausea, vomiting or diarrhea. No abdominal pain or blood.  :  No dysurea, hematuria  Neurologic:  No headache, paralysis, ataxia, numbness or tingling in the extremities. No change in bowel or bladder control.  Musculoskeletal:  No muscle pain  Hematologic:  No bleeding or bruising.  Lymphatics:  No enlarged nodes. No history of splenectomy.  Endocrine:  No reports of sweating, cold or heat intolerance. No polyuria or polydipsia.  Allergies:  No history of asthma, hives, eczema or rhinitis.    PHYSICAL EXAM:  /88   Pulse 101   Ht 1.829 m (6')   Wt 96.3 kg (212 lb 4.8 oz)   SpO2 97%   BMI 28.79 kg/m      Constitutional: awake, alert, no distress  Eyes: PERRL, sclera nonicteric  ENT: trachea midline  Respiratory: Lungs clear  Cardiovascular: Regular rate and rhythm, no murmurs  GI: nondistended, nontender, bowel sounds present  Lymph/Hematologic: no lymphadenopathy  Skin: dry, no rash  Musculoskeletal: good muscle tone, strength  5/5 in upper and lower extremities  Neurologic: no focal deficits  Neuropsychiatric: appropriate affact    DATA:  Labs:   Recent Labs   Lab Test 03/24/21  0924   CHOL 150   HDL 65   LDL 75   TRIG 48   April 2022: Potassium 4.5, creatinine 0.8    EKG, April 13, 2022: Sinus rhythm, rate 92, normal axis and intervals    ASSESSMENT:  51-year-old male seen for abnormal EKG.  His EKG is probably normal.  The computer did report septal infarct, however there are no convincing septal Q waves.  He has no anginal type symptoms.  He is hypertensive, blood pressure is still running high.  Heart rate has also been a little high in the past few months, this could be from the Ritalin.  Echo will be done to rule out any hypertensive related heart disease and ensure there is no anteroseptal wall motion abnormality.    RECOMMENDATIONS:  1.  Abnormal EKG  -Echocardiogram    2.  Hypertension, uncontrolled  -Increase amlodipine to 5 mg daily, goal blood pressure 130/80 or less  -Next step would be adding diuretic or lisinopril    Follow-up as needed based on echo result.    Maynor Guido MD  Cardiology - [facility]  Pager:  [phone]  Text Page  April 20, 2022    Thank you for allowing me to participate in the care of your patient.      Sincerely,     Maynor Guido MD     [facility]  cc:   Referred Self

## 2022-04-21 ENCOUNTER — TELEPHONE (OUTPATIENT)
Dept: NEUROLOGY | Facility: CLINIC | Age: 52
End: 2022-04-21
Payer: COMMERCIAL

## 2022-04-21 NOTE — TELEPHONE ENCOUNTER
Prior Authorization Retail Medication Request    Medication/Dose: lisdexamfetamine (VYVANSE) 40 MG capsule  ICD code (if different than what is on RX):  N/A  Previously Tried and Failed:  N/A  Rationale:  N/A    Insurance Name:  [employer]  Insurance ID:  [id number]        Pharmacy Information (if different than what is on RX)  Name:  [id number] - [place], [place] - [address]  Phone:  [phone]

## 2022-04-26 NOTE — TELEPHONE ENCOUNTER
Central Prior Authorization Team   Phone: [phone]    PA Initiation    Medication:   Insurance Company: [employer] ([facility]) - Phone [phone] Fax [phone]  Pharmacy Filling the Rx: [id number] - [place], [place] - [address]  Filling Pharmacy Phone: [phone]  Filling Pharmacy Fax: [phone]  Start Date: 4/26/2022

## 2022-04-27 ENCOUNTER — HOSPITAL ENCOUNTER (OUTPATIENT)
Dept: SPEECH THERAPY | Facility: CLINIC | Age: 52
Discharge: HOME | End: 2022-04-27
Payer: COMMERCIAL

## 2022-04-27 ENCOUNTER — OFFICE VISIT (OUTPATIENT)
Dept: FAMILY MEDICINE | Facility: CLINIC | Age: 52
End: 2022-04-27
Payer: COMMERCIAL

## 2022-04-27 VITALS
TEMPERATURE: 98.8 F | HEART RATE: 100 BPM | HEIGHT: 72 IN | DIASTOLIC BLOOD PRESSURE: 70 MMHG | WEIGHT: 213 LBS | RESPIRATION RATE: 20 BRPM | OXYGEN SATURATION: 97 % | BODY MASS INDEX: 28.85 KG/M2 | SYSTOLIC BLOOD PRESSURE: 100 MMHG

## 2022-04-27 DIAGNOSIS — Z13.220 SCREENING FOR HYPERLIPIDEMIA: ICD-10-CM

## 2022-04-27 DIAGNOSIS — F06.30 MOOD DISORDER AS LATE EFFECT OF TRAUMATIC BRAIN INJURY (H): ICD-10-CM

## 2022-04-27 DIAGNOSIS — Z12.11 SCREEN FOR COLON CANCER: ICD-10-CM

## 2022-04-27 DIAGNOSIS — F33.1 MAJOR DEPRESSIVE DISORDER, RECURRENT EPISODE, MODERATE (H): ICD-10-CM

## 2022-04-27 DIAGNOSIS — Z00.00 ROUTINE GENERAL MEDICAL EXAMINATION AT A HEALTH CARE FACILITY: Primary | ICD-10-CM

## 2022-04-27 DIAGNOSIS — B35.1 TOENAIL FUNGUS: ICD-10-CM

## 2022-04-27 DIAGNOSIS — S06.9XAS MOOD DISORDER AS LATE EFFECT OF TRAUMATIC BRAIN INJURY (H): ICD-10-CM

## 2022-04-27 PROCEDURE — 99396 PREV VISIT EST AGE 40-64: CPT | Performed by: NURSE PRACTITIONER

## 2022-04-27 PROCEDURE — 80061 LIPID PANEL: CPT | Performed by: NURSE PRACTITIONER

## 2022-04-27 PROCEDURE — 97130 THER IVNTJ EA ADDL 15 MIN: CPT | Mod: GN | Performed by: SPEECH-LANGUAGE PATHOLOGIST

## 2022-04-27 PROCEDURE — 97129 THER IVNTJ 1ST 15 MIN: CPT | Mod: GN | Performed by: SPEECH-LANGUAGE PATHOLOGIST

## 2022-04-27 PROCEDURE — 36415 COLL VENOUS BLD VENIPUNCTURE: CPT | Performed by: NURSE PRACTITIONER

## 2022-04-27 RX ORDER — TERBINAFINE HYDROCHLORIDE 250 MG/1
250 TABLET ORAL DAILY
Qty: 90 TABLET | Refills: 0 | Status: SHIPPED | OUTPATIENT
Start: 2022-04-27 | End: 2022-07-26

## 2022-04-27 ASSESSMENT — ENCOUNTER SYMPTOMS
NERVOUS/ANXIOUS: 0
CONSTIPATION: 1
ARTHRALGIAS: 0
DYSURIA: 0
JOINT SWELLING: 0
PALPITATIONS: 0
HEMATOCHEZIA: 0
CHILLS: 0
HEARTBURN: 0
SHORTNESS OF BREATH: 0
HEMATURIA: 0
ABDOMINAL PAIN: 0
MYALGIAS: 0
WEAKNESS: 1
DIARRHEA: 0
EYE PAIN: 0
PARESTHESIAS: 0
HEADACHES: 1
DIZZINESS: 0
NAUSEA: 0
FEVER: 0
FREQUENCY: 0
COUGH: 0
SORE THROAT: 0

## 2022-04-27 ASSESSMENT — ANXIETY QUESTIONNAIRES
3. WORRYING TOO MUCH ABOUT DIFFERENT THINGS: NOT AT ALL
IF YOU CHECKED OFF ANY PROBLEMS ON THIS QUESTIONNAIRE, HOW DIFFICULT HAVE THESE PROBLEMS MADE IT FOR YOU TO DO YOUR WORK, TAKE CARE OF THINGS AT HOME, OR GET ALONG WITH OTHER PEOPLE: SOMEWHAT DIFFICULT
GAD7 TOTAL SCORE: 3
2. NOT BEING ABLE TO STOP OR CONTROL WORRYING: NOT AT ALL
7. FEELING AFRAID AS IF SOMETHING AWFUL MIGHT HAPPEN: NOT AT ALL
5. BEING SO RESTLESS THAT IT IS HARD TO SIT STILL: NOT AT ALL
1. FEELING NERVOUS, ANXIOUS, OR ON EDGE: SEVERAL DAYS
6. BECOMING EASILY ANNOYED OR IRRITABLE: SEVERAL DAYS

## 2022-04-27 ASSESSMENT — PAIN SCALES - GENERAL: PAINLEVEL: SEVERE PAIN (6)

## 2022-04-27 ASSESSMENT — PATIENT HEALTH QUESTIONNAIRE - PHQ9
SUM OF ALL RESPONSES TO PHQ QUESTIONS 1-9: 5
5. POOR APPETITE OR OVEREATING: SEVERAL DAYS

## 2022-04-27 ASSESSMENT — ASTHMA QUESTIONNAIRES: ACT_TOTALSCORE: 23

## 2022-04-27 NOTE — PROGRESS NOTES
SUBJECTIVE:   CC: Westley Singh is an 52 year old male who presents for preventative health visit.       Patient has been advised of split billing requirements and indicates understanding: Yes  Healthy Habits:     Getting at least 3 servings of Calcium per day:  Yes    Bi-annual eye exam:  Yes    Dental care twice a year:  Yes    Sleep apnea or symptoms of sleep apnea:  None    Diet:  Regular (no restrictions)    Frequency of exercise:  6-7 days/week    Duration of exercise:  45-60 minutes    Taking medications regularly:  Yes    Medication side effects:  Not applicable    PHQ-2 Total Score: 2    Additional concerns today:  Yes      Toenail fungus is back.  Took oral medication and toe nail got better.   Would like to try medication again.       Working with concussion clinic for ongoing concussion symptoms.   Continues with headaches and tinnitus.   At concussion clinic had an EKG done that was abnormal.   Saw cardiology who felt EKG looked okay, but will having an echo in 2 weeks.   Cardiology increased norvasc to 5 mg.       Today's PHQ-2 Score:   PHQ-2 (  [employer]) 2022   Q1: Little interest or pleasure in doing things 1   Q2: Feeling down, depressed or hopeless 1   PHQ-2 Score 2   PHQ-2 Total Score (12-17 Years)- Positive if 3 or more points; Administer PHQ-A if positive -   Q1: Little interest or pleasure in doing things Several days   Q2: Feeling down, depressed or hopeless Several days   PHQ-2 Score 2       Abuse: Current or Past(Physical, Sexual or Emotional)- No  Do you feel safe in your environment? Yes        Social History     Tobacco Use     Smoking status: Former Smoker     Packs/day: 0.00     Years: 5.00     Pack years: 0.00     Types: Cigars     Start date: 2007     Quit date: 2016     Years since quittin.6     Smokeless tobacco: Never Used     Tobacco comment: Originally smoked from  to , restarted again .   Substance Use Topics     Alcohol use: Yes     Comment:  occasional use         Alcohol Use 4/27/2022   Prescreen: >3 drinks/day or >7 drinks/week? No   Prescreen: >3 drinks/day or >7 drinks/week? -       Last PSA: No results found for: PSA    Reviewed orders with patient. Reviewed health maintenance and updated orders accordingly - Yes  Labs reviewed in EPIC  BP Readings from Last 3 Encounters:   04/27/22 100/70   04/20/22 132/88   02/16/22 135/84    Wt Readings from Last 3 Encounters:   04/27/22 96.6 kg (213 lb)   04/20/22 96.3 kg (212 lb 4.8 oz)   12/28/21 95.6 kg (210 lb 12.8 oz)                  Current Outpatient Medications   Medication Sig Dispense Refill     albuterol (PROAIR HFA/PROVENTIL HFA/VENTOLIN HFA) 108 (90 Base) MCG/ACT inhaler Inhale 2 puffs into the lungs every 6 hours as needed for shortness of breath / dyspnea 8 g 1     ALLEGRA 180 MG OR TABS 1 tablet qd  11     amitriptyline (ELAVIL) 50 MG tablet Take 1-2 tablets ( mg) by mouth At Bedtime 60 tablet 3     amLODIPine (NORVASC) 5 MG tablet Take 1 tablet (5 mg) by mouth daily 90 tablet 3     buPROPion (WELLBUTRIN XL) 300 MG 24 hr tablet Take 1 tablet (300 mg) by mouth every morning 30 tablet 3     lamoTRIgine (LAMICTAL) 150 MG tablet Take 150 mg by mouth 2 times daily (with meals)       lisdexamfetamine (VYVANSE) 40 MG capsule Take 1 capsule (40 mg) by mouth every morning 40 capsule 0     methylphenidate (RITALIN) 10 MG tablet Take 1 tablet (10 mg) by mouth 3 times daily 90 tablet 0     butalbital-acetaminophen-caffeine (ESGIC) -40 MG tablet Take 2 tablets by mouth every 4 hours as needed for headaches (Patient not taking: No sig reported) 20 tablet 0     methylphenidate (CONCERTA) 36 MG CR tablet Take 1 tablet (36 mg) by mouth every morning (Patient not taking: Reported on 4/27/2022) 30 tablet 0     Allergies   Allergen Reactions     Aspirin      Cephalosporins      Penicillins        Reviewed and updated as needed this visit by clinical staff   Tobacco  Allergies  Meds   Med Hx  Surg  "Hx  Fam Hx  Soc Hx          Reviewed and updated as needed this visit by Provider                   Past Medical History:   Diagnosis Date     Anxiety      Asthma      Depression      Depressive disorder      Hypertension 2009    On the high end of \"normal\" 120/80s     Kidney infection 2009    Hospitalized x 2     Thyroid disease     Hashimoto's diagnosis 2001      Past Surgical History:   Procedure Laterality Date     ENT SURGERY      nasal polyps times 2     GENITOURINARY SURGERY  2009    Vascetomy     HERNIA REPAIR      under 3 years old     MAMMOPLASTY REDUCTION  age 19       Review of Systems   Constitutional: Negative for chills and fever.   HENT: Negative for congestion, ear pain, hearing loss and sore throat.    Eyes: Negative for pain and visual disturbance.   Respiratory: Negative for cough and shortness of breath.    Cardiovascular: Negative for chest pain, palpitations and peripheral edema.   Gastrointestinal: Positive for constipation. Negative for abdominal pain, diarrhea, heartburn, hematochezia and nausea.   Genitourinary: Negative for dysuria, frequency, genital sores, hematuria, impotence, penile discharge and urgency.   Musculoskeletal: Negative for arthralgias, joint swelling and myalgias.   Skin: Negative for rash.   Neurological: Positive for weakness and headaches. Negative for dizziness and paresthesias.   Psychiatric/Behavioral: Negative for mood changes. The patient is not nervous/anxious.    constipation due to psych meds.     OBJECTIVE:   /70 (BP Location: Right arm, Patient Position: Sitting, Cuff Size: Adult Regular)   Pulse 100   Temp 98.8  F (37.1  C) (Oral)   Resp 20   Ht 1.829 m (6')   Wt 96.6 kg (213 lb)   SpO2 97%   BMI 28.89 kg/m      Physical Exam  GENERAL: healthy, alert and no distress  EYES: Eyes grossly normal to inspection, PERRL and conjunctivae and sclerae normal  HENT: ear canals and TM's normal, nose and mouth without ulcers or lesions  NECK: no " adenopathy, no asymmetry, masses, or scars and thyroid normal to palpation  RESP: lungs clear to auscultation - no rales, rhonchi or wheezes  CV: regular rate and rhythm, normal S1 S2, no S3 or S4, no murmur, click or rub, no peripheral edema and peripheral pulses strong  ABDOMEN: soft, nontender, no hepatosplenomegaly, no masses and bowel sounds normal  MS: no gross musculoskeletal defects noted, no edema  SKIN: no suspicious lesions or rashes, L great toe yellow, thickened  NEURO: Normal strength and tone, mentation intact and speech normal  PSYCH: mentation appears normal, affect normal/bright    Diagnostic Test Results:  Labs reviewed in Epic    ASSESSMENT/PLAN:   1. Routine general medical examination at a health care facility  Reviewed age appropriate screenings and immunizations.  Planning to get shingles vax in the future.     2. Screen for colon cancer  Had thought about stool test; now thinking he should do colonoscopy.   - Adult Gastro Ref - Procedure Only; Future    3. Toenail fungus  Recurrent.  Recent normal LFTs.  Okay to restart, will recheck hepatic panel in 6 mos.   - terbinafine (LAMISIL) 250 MG tablet; Take 1 tablet (250 mg) by mouth daily  Dispense: 90 tablet; Refill: 0  - Hepatic panel (Albumin, ALT, AST, Bili, Alk Phos, TP); Future    4. Screening for hyperlipidemia  Not fasting today; has biometric form that needs to be completed.  Does not have form today; will send us form via [employer].   - Lipid panel reflex to direct LDL Non-fasting; Future  - Lipid panel reflex to direct LDL Non-fasting    5. 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  Continue on current medications.      6. Major depressive disorder, recurrent episode, moderate (H)  Managed by concussion clinic.  Continue current medications.     PHQ 10/25/2021 11/26/2021 4/27/2022   PHQ-9 Total Score 13 15 5   Q9: Thoughts of better off dead/self-harm past 2 weeks Not at all Not at all Not  at all       COUNSELING:   Reviewed preventive health counseling, as reflected in patient instructions       Regular exercise       Healthy diet/nutrition       Colorectal cancer screening    Estimated body mass index is 28.89 kg/m  as calculated from the following:    Height as of this encounter: 1.829 m (6').    Weight as of this encounter: 96.6 kg (213 lb).     Weight management plan: Discussed healthy diet and exercise guidelines    He reports that he quit smoking about 5 years ago. His smoking use included cigars. He started smoking about 15 years ago. He smoked 0.00 packs per day for 5.00 years. He has never used smokeless tobacco.      Counseling Resources:  ATP IV Guidelines  Pooled Cohorts Equation Calculator  FRAX Risk Assessment  ICSI Preventive Guidelines  Dietary Guidelines for Americans, 2010  USDA's MyPlate  ASA Prophylaxis  Lung CA Screening    RUI Dickinson CNP  [facility]

## 2022-04-27 NOTE — TELEPHONE ENCOUNTER
Prior Authorization Approval    Authorization Effective Date: 4/25/2022  Authorization Expiration Date: 4/25/2023  Medication: VYVANSE - APPROVED  Approved Dose/Quantity:    Reference #:     Insurance Company: [employer] ([facility]) - Phone [phone] Fax [phone]  Expected CoPay:       CoPay Card Available:      Foundation Assistance Needed:    Which Pharmacy is filling the prescription (Not needed for infusion/clinic administered): [id number] - [place], [place] - [address]  Pharmacy Notified: Yes  Patient Notified: Yes  **Instructed pharmacy to notify patient when script is ready to /ship.**

## 2022-04-28 LAB
CHOLEST SERPL-MCNC: 169 MG/DL
FASTING STATUS PATIENT QL REPORTED: NO
HDLC SERPL-MCNC: 75 MG/DL
LDLC SERPL CALC-MCNC: 80 MG/DL
NONHDLC SERPL-MCNC: 94 MG/DL
TRIGL SERPL-MCNC: 72 MG/DL

## 2022-04-28 ASSESSMENT — ANXIETY QUESTIONNAIRES: GAD7 TOTAL SCORE: 3

## 2022-05-09 ENCOUNTER — MYC MEDICAL ADVICE (OUTPATIENT)
Dept: NEUROLOGY | Facility: CLINIC | Age: 52
End: 2022-05-09
Payer: COMMERCIAL

## 2022-05-09 ENCOUNTER — MYC REFILL (OUTPATIENT)
Dept: NEUROLOGY | Facility: CLINIC | Age: 52
End: 2022-05-09
Payer: COMMERCIAL

## 2022-05-09 DIAGNOSIS — R41.840 ATTENTION AND CONCENTRATION DEFICIT: ICD-10-CM

## 2022-05-09 DIAGNOSIS — F07.81 POST CONCUSSION SYNDROME: ICD-10-CM

## 2022-05-09 DIAGNOSIS — G44.309 POST-CONCUSSION HEADACHE: ICD-10-CM

## 2022-05-09 RX ORDER — METHYLPHENIDATE HYDROCHLORIDE 10 MG/1
10 TABLET ORAL 3 TIMES DAILY
Qty: 90 TABLET | Refills: 0 | Status: SHIPPED | OUTPATIENT
Start: 2022-05-09 | End: 2022-07-05

## 2022-05-10 DIAGNOSIS — S06.9XAS MOOD DISORDER AS LATE EFFECT OF TRAUMATIC BRAIN INJURY (H): ICD-10-CM

## 2022-05-10 DIAGNOSIS — F07.81 POST CONCUSSION SYNDROME: ICD-10-CM

## 2022-05-10 DIAGNOSIS — F06.30 MOOD DISORDER AS LATE EFFECT OF TRAUMATIC BRAIN INJURY (H): ICD-10-CM

## 2022-05-10 DIAGNOSIS — R41.840 ATTENTION AND CONCENTRATION DEFICIT: ICD-10-CM

## 2022-05-11 ENCOUNTER — MYC MEDICAL ADVICE (OUTPATIENT)
Dept: FAMILY MEDICINE | Facility: CLINIC | Age: 52
End: 2022-05-11
Payer: COMMERCIAL

## 2022-05-11 RX ORDER — BUPROPION HYDROCHLORIDE 300 MG/1
TABLET ORAL
Qty: 30 TABLET | Refills: 4 | Status: SHIPPED | OUTPATIENT
Start: 2022-05-11 | End: 2022-09-27

## 2022-05-11 NOTE — TELEPHONE ENCOUNTER
lvm to advise form is completed but patient does need to come in and sign the form, can take the form with him or we can fax when signed.      Form is at the FD.

## 2022-05-11 NOTE — TELEPHONE ENCOUNTER
Medication refill request for buPROPion (WELLBUTRIN XL) 300 MG 24 hr tablet.     Patient was last seen on April 13, 2022.  Patient's next appointment is scheduled with Chrissy on May 12, 2022.    Medication T'd for review and signature    YANA Coombs on 5/11/2022 at 8:02 AM

## 2022-05-11 NOTE — TELEPHONE ENCOUNTER
Form completed. Pt will need to  because he has not signed the form.  In my out basket.     Abby Fuentes CNP

## 2022-05-12 ENCOUNTER — VIRTUAL VISIT (OUTPATIENT)
Dept: NEUROLOGY | Facility: CLINIC | Age: 52
End: 2022-05-12
Payer: COMMERCIAL

## 2022-05-12 ENCOUNTER — OFFICE VISIT (OUTPATIENT)
Dept: URGENT CARE | Facility: URGENT CARE | Age: 52
End: 2022-05-12
Payer: COMMERCIAL

## 2022-05-12 ENCOUNTER — TELEPHONE (OUTPATIENT)
Dept: FAMILY MEDICINE | Facility: CLINIC | Age: 52
End: 2022-05-12
Payer: COMMERCIAL

## 2022-05-12 VITALS
DIASTOLIC BLOOD PRESSURE: 101 MMHG | TEMPERATURE: 98 F | SYSTOLIC BLOOD PRESSURE: 142 MMHG | RESPIRATION RATE: 20 BRPM | HEART RATE: 78 BPM | OXYGEN SATURATION: 98 %

## 2022-05-12 DIAGNOSIS — F07.81 POST CONCUSSION SYNDROME: Primary | ICD-10-CM

## 2022-05-12 DIAGNOSIS — G47.00 INSOMNIA, UNSPECIFIED TYPE: ICD-10-CM

## 2022-05-12 DIAGNOSIS — R13.10 DYSPHAGIA, UNSPECIFIED TYPE: Primary | ICD-10-CM

## 2022-05-12 PROCEDURE — 99213 OFFICE O/P EST LOW 20 MIN: CPT | Performed by: FAMILY MEDICINE

## 2022-05-12 PROCEDURE — 99215 OFFICE O/P EST HI 40 MIN: CPT | Mod: GT | Performed by: NURSE PRACTITIONER

## 2022-05-12 RX ORDER — MIRTAZAPINE 7.5 MG/1
7.5 TABLET, FILM COATED ORAL AT BEDTIME
Qty: 30 TABLET | Refills: 3 | Status: SHIPPED | OUTPATIENT
Start: 2022-05-12 | End: 2022-08-29
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
## 2022-05-12 NOTE — PROGRESS NOTES
"SUBJECTIVE:  Chief Complaint   Patient presents with     Urgent Care     Pt feels a bump on his throat X3 days      Westley Singh is a 52 year old male who presents with a chief complaint of trouble swallowing/throat discomfort.    Developed symptoms about 2 days ago, was trying to eat a cherry tomatoe and somehow ended up getting stuck and had to throw it up.  Since then, has continue to have trouble with swallowing food, is able to keep water down.    Did have virtual visit and recommended to take omperazole and pepcid.  Endorsed burning sensation yesterday.  Today has more discomfort in sternal area.  Worried that is not able to eat food.    Past Medical History:   Diagnosis Date     Anxiety      Asthma      Depression      Depressive disorder      Hypertension 2009    On the high end of \"normal\" 120/80s     Kidney infection 2009    Hospitalized x 2     Thyroid disease     Hashimoto's diagnosis 2001     Current Outpatient Medications   Medication Sig Dispense Refill     albuterol (PROAIR HFA/PROVENTIL HFA/VENTOLIN HFA) 108 (90 Base) MCG/ACT inhaler Inhale 2 puffs into the lungs every 6 hours as needed for shortness of breath / dyspnea 8 g 1     ALLEGRA 180 MG OR TABS 1 tablet qd  11     amitriptyline (ELAVIL) 50 MG tablet Take 1-2 tablets ( mg) by mouth At Bedtime 60 tablet 3     amLODIPine (NORVASC) 5 MG tablet Take 1 tablet (5 mg) by mouth daily 90 tablet 3     buPROPion (WELLBUTRIN XL) 300 MG 24 hr tablet TAKE ONE TABLET BY MOUTH IN THE MORNING 30 tablet 4     lamoTRIgine (LAMICTAL) 150 MG tablet Take 150 mg by mouth 2 times daily (with meals)       lisdexamfetamine (VYVANSE) 40 MG capsule Take 1 capsule (40 mg) by mouth every morning 40 capsule 0     methylphenidate (RITALIN) 10 MG tablet Take 1 tablet (10 mg) by mouth 3 times daily 90 tablet 0     mirtazapine (REMERON) 7.5 MG tablet Take 1 tablet (7.5 mg) by mouth At Bedtime 30 tablet 3     terbinafine (LAMISIL) 250 MG tablet Take 1 tablet (250 mg) by " mouth daily 90 tablet 0     butalbital-acetaminophen-caffeine (ESGIC) -40 MG tablet Take 2 tablets by mouth every 4 hours as needed for headaches (Patient not taking: No sig reported) 20 tablet 0     methylphenidate (CONCERTA) 36 MG CR tablet Take 1 tablet (36 mg) by mouth every morning (Patient not taking: No sig reported) 30 tablet 0     Social History     Tobacco Use     Smoking status: Former Smoker     Packs/day: 0.00     Years: 5.00     Pack years: 0.00     Types: Cigars     Start date: 2007     Quit date: 2016     Years since quittin.6     Smokeless tobacco: Never Used     Tobacco comment: Originally smoked from  to , restarted again .   Substance Use Topics     Alcohol use: Yes     Comment: occasional use       ROS:  Review of systems negative except as stated above.    EXAM:   BP (!) 142/101   Pulse 78   Temp 98  F (36.7  C)   Resp 20   SpO2 98%   GENERAL APPEARANCE: healthy, alert and no distress  NECK: mild discomfort on upper neck/below submandibular area, no enlarge swelling on thyroid area  PSYCH: alert, affect bright      ASSESSMENT/PLAN:  (R13.10) Dysphagia, unspecified type  (primary encounter diagnosis)  Plan: continue with omeprazole      Patient is able to drink fluids and reassurance given.  Reviewed that location of discomfort and feeling food is stuck will need further evaluation, most likely a swallow study and/or ENT evaluation/scope.  Discussed that may still be due to GERD getting up high enough and okay to continue with omeprazole for 2 weeks duration.  Okay to continue with pepcid if needed.  To ER if unable to keep any fluids down and throwing it up.    Follow up with primary provider for further dysphagia workup/evaluation    Devon Erickson MD  May 12, 2022 8:35 PM

## 2022-05-12 NOTE — TELEPHONE ENCOUNTER
Needs to be seen today if he is still having difficulty swallowing and not able to keep anything down.  Advise UC/ER.     Abby Fuentes CNP

## 2022-05-12 NOTE — TELEPHONE ENCOUNTER
Patient calling stating he had a Virtual Visit with Urgent Care yesterday and still has this sensation that something is stuck in his throat, difficulty with swallowing and unable to keep anything down.  He states that he tried the Pepcid AC and Prilosec and neither made a difference.  States he at a banana this am and shortly afterwards ended up vomiting it up (watery substance with small chunks).  Drinking water but feels like it goes in slower.  Unable to burp but feels like he has to.  Patient wondering if he should be seen in clinic for f/u or ED? (No appointments until next week)  Please review and advise further.    Notes from Virtual Urgent Care Visit 5/11/2022:    Globus sensation (Primary)  Possible this could be reflux, but there are many differentials we discussed. Trial of pepcid AC over the counter and/or prilosec, diet modification   If this does not make any difference in 24 hours please be seen in a clinic  Sooner if anything worsens - we discussed all emergent signs and symptoms in great detail - patient verbalizes understanding - discussed limitations of telemedicine. He may need egd to get to the bottom of this and should also have a physical exam with his thyroid history     Will send to POD and PCP to review.    [phone]    Joy Almaraz RN
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
## 2022-05-14 ENCOUNTER — HOSPITAL ENCOUNTER (EMERGENCY)
Facility: CLINIC | Age: 52
Discharge: HOME | End: 2022-05-14
Attending: EMERGENCY MEDICINE | Admitting: EMERGENCY MEDICINE
Payer: COMMERCIAL

## 2022-05-14 VITALS
SYSTOLIC BLOOD PRESSURE: 135 MMHG | TEMPERATURE: 98 F | OXYGEN SATURATION: 96 % | DIASTOLIC BLOOD PRESSURE: 95 MMHG | HEART RATE: 100 BPM | RESPIRATION RATE: 16 BRPM

## 2022-05-14 DIAGNOSIS — R10.13 DYSPEPSIA: ICD-10-CM

## 2022-05-14 DIAGNOSIS — R10.13 ABDOMINAL PAIN, EPIGASTRIC: ICD-10-CM

## 2022-05-14 DIAGNOSIS — R11.2 NON-INTRACTABLE VOMITING WITH NAUSEA, UNSPECIFIED VOMITING TYPE: ICD-10-CM

## 2022-05-14 LAB
ALBUMIN SERPL-MCNC: 3.5 G/DL (ref 3.4–5)
ALP SERPL-CCNC: 67 U/L (ref 40–150)
ALT SERPL W P-5'-P-CCNC: 37 U/L (ref 0–70)
ANION GAP SERPL CALCULATED.3IONS-SCNC: 3 MMOL/L (ref 3–14)
AST SERPL W P-5'-P-CCNC: 31 U/L (ref 0–45)
BASOPHILS # BLD AUTO: 0 10E3/UL (ref 0–0.2)
BASOPHILS NFR BLD AUTO: 0 %
BILIRUB SERPL-MCNC: 0.8 MG/DL (ref 0.2–1.3)
BUN SERPL-MCNC: 5 MG/DL (ref 7–30)
CALCIUM SERPL-MCNC: 9.1 MG/DL (ref 8.5–10.1)
CHLORIDE BLD-SCNC: 105 MMOL/L (ref 94–109)
CO2 SERPL-SCNC: 27 MMOL/L (ref 20–32)
CREAT SERPL-MCNC: 0.71 MG/DL (ref 0.66–1.25)
EOSINOPHIL # BLD AUTO: 0.1 10E3/UL (ref 0–0.7)
EOSINOPHIL NFR BLD AUTO: 1 %
ERYTHROCYTE [DISTWIDTH] IN BLOOD BY AUTOMATED COUNT: 11.4 % (ref 10–15)
GFR SERPL CREATININE-BSD FRML MDRD: >90 ML/MIN/1.73M2
GLUCOSE BLD-MCNC: 101 MG/DL (ref 70–99)
HCT VFR BLD AUTO: 40.9 % (ref 40–53)
HGB BLD-MCNC: 13.7 G/DL (ref 13.3–17.7)
HOLD SPECIMEN: NORMAL
IMM GRANULOCYTES # BLD: 0 10E3/UL
IMM GRANULOCYTES NFR BLD: 0 %
LIPASE SERPL-CCNC: 110 U/L (ref 73–393)
LYMPHOCYTES # BLD AUTO: 1.7 10E3/UL (ref 0.8–5.3)
LYMPHOCYTES NFR BLD AUTO: 32 %
MCH RBC QN AUTO: 33.4 PG (ref 26.5–33)
MCHC RBC AUTO-ENTMCNC: 33.5 G/DL (ref 31.5–36.5)
MCV RBC AUTO: 100 FL (ref 78–100)
MONOCYTES # BLD AUTO: 0.5 10E3/UL (ref 0–1.3)
MONOCYTES NFR BLD AUTO: 10 %
NEUTROPHILS # BLD AUTO: 3 10E3/UL (ref 1.6–8.3)
NEUTROPHILS NFR BLD AUTO: 57 %
NRBC # BLD AUTO: 0 10E3/UL
NRBC BLD AUTO-RTO: 0 /100
PLATELET # BLD AUTO: 211 10E3/UL (ref 150–450)
POTASSIUM BLD-SCNC: 4.1 MMOL/L (ref 3.4–5.3)
PROT SERPL-MCNC: 7.1 G/DL (ref 6.8–8.8)
RBC # BLD AUTO: 4.1 10E6/UL (ref 4.4–5.9)
SODIUM SERPL-SCNC: 135 MMOL/L (ref 133–144)
TROPONIN I SERPL HS-MCNC: 4 NG/L
WBC # BLD AUTO: 5.3 10E3/UL (ref 4–11)

## 2022-05-14 PROCEDURE — 84484 ASSAY OF TROPONIN QUANT: CPT | Performed by: EMERGENCY MEDICINE

## 2022-05-14 PROCEDURE — 80053 COMPREHEN METABOLIC PANEL: CPT | Performed by: EMERGENCY MEDICINE

## 2022-05-14 PROCEDURE — 85018 HEMOGLOBIN: CPT | Performed by: EMERGENCY MEDICINE

## 2022-05-14 PROCEDURE — 250N000013 HC RX MED GY IP 250 OP 250 PS 637: Performed by: EMERGENCY MEDICINE

## 2022-05-14 PROCEDURE — 99284 EMERGENCY DEPT VISIT MOD MDM: CPT | Mod: 25

## 2022-05-14 PROCEDURE — C9113 INJ PANTOPRAZOLE SODIUM, VIA: HCPCS | Performed by: EMERGENCY MEDICINE

## 2022-05-14 PROCEDURE — 250N000009 HC RX 250: Performed by: EMERGENCY MEDICINE

## 2022-05-14 PROCEDURE — 93005 ELECTROCARDIOGRAM TRACING: CPT

## 2022-05-14 PROCEDURE — 250N000011 HC RX IP 250 OP 636: Performed by: EMERGENCY MEDICINE

## 2022-05-14 PROCEDURE — 96374 THER/PROPH/DIAG INJ IV PUSH: CPT

## 2022-05-14 PROCEDURE — 36415 COLL VENOUS BLD VENIPUNCTURE: CPT | Performed by: EMERGENCY MEDICINE

## 2022-05-14 PROCEDURE — 83690 ASSAY OF LIPASE: CPT | Performed by: EMERGENCY MEDICINE

## 2022-05-14 PROCEDURE — 96375 TX/PRO/DX INJ NEW DRUG ADDON: CPT

## 2022-05-14 RX ORDER — ONDANSETRON 2 MG/ML
4 INJECTION INTRAMUSCULAR; INTRAVENOUS
Status: DISCONTINUED | OUTPATIENT
Start: 2022-05-14 | End: 2022-05-14 | Stop reason: HOSPADM

## 2022-05-14 RX ORDER — SUCRALFATE ORAL 1 G/10ML
1 SUSPENSION ORAL ONCE
Status: COMPLETED | OUTPATIENT
Start: 2022-05-14 | End: 2022-05-14

## 2022-05-14 RX ORDER — ONDANSETRON 4 MG/1
4 TABLET, ORALLY DISINTEGRATING ORAL EVERY 8 HOURS PRN
Qty: 10 TABLET | Refills: 0 | Status: SHIPPED | OUTPATIENT
Start: 2022-05-14 | End: 2022-05-14

## 2022-05-14 RX ORDER — SUCRALFATE ORAL 1 G/10ML
1 SUSPENSION ORAL 4 TIMES DAILY PRN
Qty: 414 ML | Refills: 0 | Status: SHIPPED | OUTPATIENT
Start: 2022-05-14 | End: 2023-07-18

## 2022-05-14 RX ORDER — ALUMINA, MAGNESIA, AND SIMETHICONE 2400; 2400; 240 MG/30ML; MG/30ML; MG/30ML
30 SUSPENSION ORAL EVERY 6 HOURS PRN
Qty: 100 ML | Refills: 0 | Status: SHIPPED | OUTPATIENT
Start: 2022-05-14 | End: 2022-06-15

## 2022-05-14 RX ORDER — ONDANSETRON 4 MG/1
4 TABLET, ORALLY DISINTEGRATING ORAL EVERY 8 HOURS PRN
Qty: 10 TABLET | Refills: 0 | Status: SHIPPED | OUTPATIENT
Start: 2022-05-14 | End: 2023-07-18

## 2022-05-14 RX ORDER — SUCRALFATE 1 G/1
1 TABLET ORAL ONCE
Status: DISCONTINUED | OUTPATIENT
Start: 2022-05-14 | End: 2022-05-14

## 2022-05-14 RX ADMIN — LIDOCAINE HYDROCHLORIDE 30 ML: 20 SOLUTION ORAL; TOPICAL at 10:57

## 2022-05-14 RX ADMIN — PANTOPRAZOLE SODIUM 80 MG: 40 INJECTION, POWDER, FOR SOLUTION INTRAVENOUS at 10:57

## 2022-05-14 RX ADMIN — SUCRALFATE 1 G: 1 SUSPENSION ORAL at 13:29

## 2022-05-14 RX ADMIN — ONDANSETRON 4 MG: 2 INJECTION INTRAMUSCULAR; INTRAVENOUS at 10:57

## 2022-05-14 ASSESSMENT — ENCOUNTER SYMPTOMS
TROUBLE SWALLOWING: 1
NAUSEA: 1
ARTHRALGIAS: 0
VOMITING: 1
CONSTIPATION: 1
BLOOD IN STOOL: 0
SHORTNESS OF BREATH: 0

## 2022-05-14 NOTE — ED PROVIDER NOTES
"  History   Chief Complaint:  Nausea & Vomiting and Esophageal Pain     The history is provided by the patient.      Westley Singh is a 52 year old male with history of hypertension who presents with nausea, vomiting and esophageal pain. Patient reports that he has not been able to keep down any solid foods down for 4 days. States that this began after he ate cherry tomatoes last Tuesday, which he immediately vomited up. Notes that on Wednesday the vomiting was accompanied with painful swallowing and a feeling of \"burning\" in his lower esophagus. When he eats, his esophagus has this burning sensation immediately followed by him vomiting. His symptoms are unchanged by his position. No radiation of his esophageal pain and water alleviates this pain. Adds that he went to  2 days ago, where he was told to take Prilosec and Pepcid, which has not alleviated his symptoms. He has taken Pepcid once and Prilosec three times. Reports that he has not had a bowel movement for 4 days, but is still passing gas. No shortness of breath or abnormal chest pain. No pain in his jaw or arms. No recent black or bloody stools. No chronic steroid use or excessive ibuprofen use. He drinks 15-20 drinks of alcohol a week.     Review of Systems   HENT: Positive for trouble swallowing.    Respiratory: Negative for shortness of breath.    Cardiovascular: Negative for chest pain.   Gastrointestinal: Positive for constipation, nausea and vomiting. Negative for blood in stool.   Musculoskeletal: Negative for arthralgias.   All other systems reviewed and are negative.    Allergies:  Aspirin  Cephalosporins  Penicillins    Medications:  Albuterol inhaler  Elavil  Norvasc  Wellbutrin XL  Lamictal   Vyvanse  Concerta  Ritalin  Remeron  Lamisil    Past Medical History:     Anxiety  Asthma  Depression  Hypertension  Kidney infection  Hashimoto's thyroiditis  ADHD    Past Surgical History:    Vasectomy  Nasal polyps surgery  Hernia repair  Reduction " mammoplasty    Family History:    Mother - hypertension, Graces disease  Father - heart disease    Social History:  Presents with his wife    Drinks alcohol    Physical Exam     Patient Vitals for the past 24 hrs:   BP Temp Temp src Pulse Resp SpO2   05/14/22 1330 (!) 135/95 -- -- 100 16 96 %   05/14/22 1315 (!) 142/106 -- -- 101 -- 97 %   05/14/22 1300 (!) 136/94 -- -- 94 16 96 %   05/14/22 1245 (!) 139/96 -- -- 94 -- 96 %   05/14/22 1230 (!) 132/101 -- -- 94 -- 97 %   05/14/22 1215 (!) 143/99 -- -- 96 -- 97 %   05/14/22 1200 (!) 146/97 -- -- 97 -- 95 %   05/14/22 1145 (!) 141/102 -- -- 90 -- 94 %   05/14/22 1130 (!) 136/95 -- -- 97 -- 92 %   05/14/22 1115 (!) 138/99 -- -- 93 -- 94 %   05/14/22 1100 (!) 136/97 -- -- 91 -- 93 %   05/14/22 1045 (!) 141/110 -- -- 95 -- 93 %   05/14/22 0951 (!) 187/91 98  F (36.7  C) Temporal 115 18 99 %       Physical Exam  General: Alert, no acute distress; well appearing  HEENT:  Moist mucous membranes.  Conjunctiva normal.  CV:  RRR, no m/r/g, skin warm and well perfused  Pulm:  CTAB, no wheezes/ronchi/rales.  No acute distress, breathing comfortably  GI:  Soft, mild epigastric tenderness, nondistended.  No rebound or guarding.    MSK:  Moving all extremities.  No focal areas of edema, erythema, or tenderness  Skin:  WWP, no rashes, no lower extremity edema, skin color normal, no diaphoresis    Emergency Department Course   ECG  ECG results from 05/14/22   EKG 12 lead     Value    Systolic Blood Pressure     Diastolic Blood Pressure     Ventricular Rate 88    Atrial Rate 88    NH Interval 158    QRS Duration 96        QTc 433    P Axis 12    R AXIS -27    T Axis 7    Interpretation ECG      Sinus rhythm  Septal infarct (cited on or before 13-APR-2022)  Abnormal ECG  When compared with ECG of 13-APR-2022 14:03,  No significant change was found       Laboratory:  Labs Ordered and Resulted from Time of ED Arrival to Time of ED Departure   COMPREHENSIVE METABOLIC PANEL -  Abnormal       Result Value    Sodium 135      Potassium 4.1      Chloride 105      Carbon Dioxide (CO2) 27      Anion Gap 3      Urea Nitrogen 5 (*)     Creatinine 0.71      Calcium 9.1      Glucose 101 (*)     Alkaline Phosphatase 67      AST 31      ALT 37      Protein Total 7.1      Albumin 3.5      Bilirubin Total 0.8      GFR Estimate >90     CBC WITH PLATELETS AND DIFFERENTIAL - Abnormal    WBC Count 5.3      RBC Count 4.10 (*)     Hemoglobin 13.7      Hematocrit 40.9            MCH 33.4 (*)     MCHC 33.5      RDW 11.4      Platelet Count 211      % Neutrophils 57      % Lymphocytes 32      % Monocytes 10      % Eosinophils 1      % Basophils 0      % Immature Granulocytes 0      NRBCs per 100 WBC 0      Absolute Neutrophils 3.0      Absolute Lymphocytes 1.7      Absolute Monocytes 0.5      Absolute Eosinophils 0.1      Absolute Basophils 0.0      Absolute Immature Granulocytes 0.0      Absolute NRBCs 0.0     LIPASE - Normal    Lipase 110     TROPONIN I - Normal    Troponin I High Sensitivity 4       Emergency Department Course:         Reviewed:  I reviewed nursing notes, vitals, past medical history and Care Everywhere    Assessments:  1035 I obtained history and examined the patient as noted above.   1315 I rechecked the patient and explained findings. Amenable to discharge.     Interventions:  1057 Zofran, 4 mg, IV          Protonix, 80 mg, IV          GI cocktail, 30 mL, PO    Disposition:  The patient was discharged to home.     Impression & Plan   Medical Decision Making:  Westley Singh is a 52 year old male with history of hypertension presents to the ER for evaluation of epigastric abdominal pain.  Please see above for details of HPI and exam.  He is afebrile vitally stable.  He has mild epigastric tenderness that is reproducible on exam.  Broad differentials considered including but not limited to GERD, PUD, gastritis/esophagitis, hepatobiliary pathology, pancreatitis amongst other things.   Also considered atypical ACS but fortunately work-up for this is reassuring.  EKG shows no acute ischemic changes and troponin is within normal limits despite 4 days of symptoms.  He has no lower abdominal tenderness or guarding/rebound to suggest intra-abdominal catastrophe or perforated viscus requiring CT imaging.  He has no right upper quadrant tenderness to suggest biliary colic.  Lab studies above are largely unremarkable.  Overall suspect gastritis versus GERD versus PUD.  Stool colors of otherwise remained normal therefore low suspicion for bleeding ulcer.  Given his stability and overall reassuring work-up, I feel that he is safe to discharge home with expectant management.  I will have him take Prilosec and I will add Carafate, Maalox and Zofran for symptom management.  Follow-up with GI if symptoms fail to improve in the next couple weeks.  Reasons to return to the ER were discussed all questions answered prior to discharge    Diagnosis:    ICD-10-CM    1. Abdominal pain, epigastric  R10.13    2. Non-intractable vomiting with nausea, unspecified vomiting type  R11.2    3. Dyspepsia  R10.13        Discharge Medications:  Discharge Medication List as of 5/14/2022  1:22 PM      START taking these medications    Details   alum & mag hydroxide-simethicone (MAALOX ADVANCED MAX ST) 400-400-40 MG/5ML SUSP suspension Take 30 mLs by mouth every 6 hours as needed for indigestion or heartburn, Disp-100 mL, R-0, Local Print      sucralfate (CARAFATE) 1 GM/10ML suspension Take 10 mLs (1 g) by mouth 4 times daily as needed (Heartburn, nausea), Disp-414 mL, R-0, Local Print      omeprazole (PRILOSEC) 20 MG DR capsule Take 2 capsules (40 mg) by mouth daily, Disp-60 capsule, R-0, Local Print      ondansetron (ZOFRAN ODT) 4 MG ODT tab Take 1 tablet (4 mg) by mouth every 8 hours as needed for nausea or vomiting, Disp-10 tablet, R-0, Local Print             Scribe Disclosure:  Scotty WHEELER, am serving as a scribe at 10:25  AM on 5/14/2022 to document services personally performed by Campbell Arellano MD based on my observations and the provider's statements to me.            Campbell Arellano MD  05/14/22 1525

## 2022-05-14 NOTE — ED TRIAGE NOTES
Pt arrives with c/o inability to keep anything down besides water since Tuesday. Pt reports it started after attempting to eat cherry tomatoes while cooking dinner. Pt ate them, very quickly got sick and vomited. Pt reported on wednesday it hurt to swallow. Pt reports that anything he eats starts to burn after hitting his lower esophagus. He then vomits anything he attempts to eat. Pt has been seen at  and did a nurse visit, has been following their recommendations without relief.     Triage Assessment     Row Name 05/14/22 0952       Triage Assessment (Adult)    Airway WDL WDL       Respiratory WDL    Respiratory WDL WDL       Skin Circulation/Temperature WDL    Skin Circulation/Temperature WDL WDL       Cardiac WDL    Cardiac WDL WDL       Peripheral/Neurovascular WDL    Peripheral Neurovascular WDL WDL       Cognitive/Neuro/Behavioral WDL    Cognitive/Neuro/Behavioral WDL WDL

## 2022-05-16 ENCOUNTER — TRANSFERRED RECORDS (OUTPATIENT)
Dept: HEALTH INFORMATION MANAGEMENT | Facility: CLINIC | Age: 52
End: 2022-05-16

## 2022-05-16 LAB
ATRIAL RATE - MUSE: 88 BPM
DIASTOLIC BLOOD PRESSURE - MUSE: NORMAL MMHG
INTERPRETATION ECG - MUSE: NORMAL
P AXIS - MUSE: 12 DEGREES
PR INTERVAL - MUSE: 158 MS
QRS DURATION - MUSE: 96 MS
QT - MUSE: 358 MS
QTC - MUSE: 433 MS
R AXIS - MUSE: -27 DEGREES
SYSTOLIC BLOOD PRESSURE - MUSE: NORMAL MMHG
T AXIS - MUSE: 7 DEGREES
VENTRICULAR RATE- MUSE: 88 BPM

## 2022-05-18 ENCOUNTER — HOSPITAL ENCOUNTER (OUTPATIENT)
Dept: CARDIOLOGY | Facility: CLINIC | Age: 52
Discharge: HOME | End: 2022-05-18
Attending: INTERNAL MEDICINE | Admitting: INTERNAL MEDICINE
Payer: COMMERCIAL

## 2022-05-18 ENCOUNTER — HOSPITAL ENCOUNTER (OUTPATIENT)
Dept: OCCUPATIONAL THERAPY | Facility: CLINIC | Age: 52
Discharge: HOME | End: 2022-05-18
Payer: COMMERCIAL

## 2022-05-18 DIAGNOSIS — Z78.9 DECREASED ACTIVITIES OF DAILY LIVING (ADL): ICD-10-CM

## 2022-05-18 DIAGNOSIS — R94.31 ABNORMAL ELECTROCARDIOGRAM: ICD-10-CM

## 2022-05-18 DIAGNOSIS — Z78.9 IMPAIRED INSTRUMENTAL ACTIVITIES OF DAILY LIVING (IADL): ICD-10-CM

## 2022-05-18 DIAGNOSIS — S06.0X0A CONCUSSION WITHOUT LOSS OF CONSCIOUSNESS, INITIAL ENCOUNTER: Primary | ICD-10-CM

## 2022-05-18 DIAGNOSIS — I10 BENIGN ESSENTIAL HYPERTENSION: Primary | ICD-10-CM

## 2022-05-18 DIAGNOSIS — I77.819 DILATION OF AORTA (H): ICD-10-CM

## 2022-05-18 DIAGNOSIS — I10 BENIGN ESSENTIAL HYPERTENSION: ICD-10-CM

## 2022-05-18 LAB — LVEF ECHO: NORMAL

## 2022-05-18 PROCEDURE — 93306 TTE W/DOPPLER COMPLETE: CPT | Mod: 26 | Performed by: INTERNAL MEDICINE

## 2022-05-18 PROCEDURE — 93306 TTE W/DOPPLER COMPLETE: CPT

## 2022-05-18 PROCEDURE — 97535 SELF CARE MNGMENT TRAINING: CPT | Mod: GO | Performed by: OCCUPATIONAL THERAPIST

## 2022-05-18 NOTE — ADDENDUM NOTE
Encounter addended by: Rachael Gomes, OT on: 5/18/2022 10:10 AM   Actions taken: Flowsheet accepted, Charge Capture section accepted

## 2022-05-20 ENCOUNTER — VIRTUAL VISIT (OUTPATIENT)
Dept: NEUROLOGY | Facility: CLINIC | Age: 52
End: 2022-05-20
Payer: COMMERCIAL

## 2022-05-20 ENCOUNTER — HOSPITAL ENCOUNTER (OUTPATIENT)
Dept: SPEECH THERAPY | Facility: CLINIC | Age: 52
Discharge: HOME | End: 2022-05-20
Payer: COMMERCIAL

## 2022-05-20 DIAGNOSIS — F07.81 POST CONCUSSION SYNDROME: Primary | ICD-10-CM

## 2022-05-20 DIAGNOSIS — R41.840 ATTENTION AND CONCENTRATION DEFICIT: ICD-10-CM

## 2022-05-20 PROCEDURE — 97130 THER IVNTJ EA ADDL 15 MIN: CPT | Mod: GN | Performed by: SPEECH-LANGUAGE PATHOLOGIST

## 2022-05-20 PROCEDURE — 97129 THER IVNTJ 1ST 15 MIN: CPT | Mod: GN | Performed by: SPEECH-LANGUAGE PATHOLOGIST

## 2022-05-20 PROCEDURE — 99215 OFFICE O/P EST HI 40 MIN: CPT | Mod: GT | Performed by: NURSE PRACTITIONER

## 2022-05-20 RX ORDER — LISDEXAMFETAMINE DIMESYLATE 50 MG/1
50 CAPSULE ORAL EVERY MORNING
Qty: 30 CAPSULE | Refills: 0 | Status: SHIPPED | OUTPATIENT
Start: 2022-05-20 | End: 2022-06-21
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
## 2022-05-25 ENCOUNTER — HOSPITAL ENCOUNTER (OUTPATIENT)
Dept: SPEECH THERAPY | Facility: CLINIC | Age: 52
Discharge: HOME | End: 2022-05-25
Payer: COMMERCIAL

## 2022-05-25 ENCOUNTER — HOSPITAL ENCOUNTER (OUTPATIENT)
Dept: OCCUPATIONAL THERAPY | Facility: CLINIC | Age: 52
Discharge: HOME | End: 2022-05-25

## 2022-05-25 DIAGNOSIS — G47.00 INSOMNIA, UNSPECIFIED TYPE: ICD-10-CM

## 2022-05-25 DIAGNOSIS — F07.81 POST CONCUSSION SYNDROME: ICD-10-CM

## 2022-05-25 DIAGNOSIS — Z78.9 DECREASED ACTIVITIES OF DAILY LIVING (ADL): ICD-10-CM

## 2022-05-25 DIAGNOSIS — Z78.9 IMPAIRED INSTRUMENTAL ACTIVITIES OF DAILY LIVING (IADL): ICD-10-CM

## 2022-05-25 DIAGNOSIS — S06.0X0A CONCUSSION WITHOUT LOSS OF CONSCIOUSNESS, INITIAL ENCOUNTER: Primary | ICD-10-CM

## 2022-05-25 PROCEDURE — 97130 THER IVNTJ EA ADDL 15 MIN: CPT | Mod: GN | Performed by: SPEECH-LANGUAGE PATHOLOGIST

## 2022-05-25 PROCEDURE — 97535 SELF CARE MNGMENT TRAINING: CPT | Mod: GO | Performed by: OCCUPATIONAL THERAPIST

## 2022-05-25 PROCEDURE — 97129 THER IVNTJ 1ST 15 MIN: CPT | Mod: GN | Performed by: SPEECH-LANGUAGE PATHOLOGIST

## 2022-05-25 NOTE — PROGRESS NOTES
[facility]    Outpatient Speech Language Pathology Progress Note  Patient: Westley Singh  : 1970    Beginning/End Dates of Reporting Period:  2022 to 2022    Referring Provider: RUI Muhammad-MANJULA    Therapy Diagnosis: Mild to moderate cognitive linguisic impairments secondary to post concussion syndrome    Client Self Report: Pt is a 52 y.o. male who completed an OP SLP evaluation on 2022, please see the EMR for further information. At this time Pt plans to add in 3-4 hrs/day on  and shorten other work days to 6 hrs/day. Memory continues to be endorsed at a barrier for home and work tasks.     Outcome Measures (most recent score):        Goals:  Goal Identifier LTG 1-Cognition-Memory   Goal Description Patient will learn, implement and demonstrate use of 3 internal or external memory support strategies to complete a moderate to complex level recall task (e.g. recall for 24 hours, listen to a 10 minute conversation and recall information to answer questions) with 80% accy and minimal increase in concussion symptoms in order to remember plans and meetings throughout his workday.    Target Date 22-extend goal 22   Date Met      Progress (detail required for progress note):  Goal not met, Pt demonstrating 60-75% and improves to 100% given min to mod cues, Pt endorses increasing post concussion symptoms including increased HA and fatigue with higher level cognitive tasks. Continue goal.      Goal Identifier LTG 2-Cognition-Logic   Goal Description Pt will complete moderate verbal/written reasoning skills for sequencing/daily planning needs with 80% accuracy and minimal increase in concussion symptoms per Pt report.    Target Date 22-Extend goal 2022   Date Met      Progress (detail required for progress note):  Goal not met, Pt demonstrates % provided min to  mod cues with moderate increase in symptoms. Pt continues to endorse difficulty with organization and sequencing in occupational tasks. Continue goal.      Goal Identifier LTG 3-Cognition-Attention    Goal Description Pt will complete a moderate to complex level sustained or divided attention tasks with a distracter present with 80% accuracy and minimal increase in concussion symptoms per Pt report in order to take notes while participating in a work meeting.    Target Date 05/29/22   Date Met      Progress (detail required for progress note):  Goal not met, Pt demonstrates inconsistent accuracy for divided attention tasks, significant difficulty reported in work meetings with listing and note taking simultaneously. See modified goal below.      Goal Identifier  LTG 3A-Attention NEW GOAL    Goal Description Pt will complete a complex level divided attention task with <3 cues or errors noted and a minimal increase in post concussion symptoms in order to listen and take notes while in meetings at work.    Target Date 7/29/2022   Date Met      Progress (detail required for progress note):           Plan:  Continue therapy per current plan of care.    Discharge:  No    Thank you for referring Westley Singh to outpatient therapy at [facility]. Please call Pooja Baker MA, SLP-CCC at [phone] email [email or web address] with any questions or concerns.      Pooja Baker M.A., SLP-CCC  Speech-Language Pathologist

## 2022-05-26 ENCOUNTER — OFFICE VISIT (OUTPATIENT)
Dept: PHYSICAL MEDICINE AND REHAB | Facility: CLINIC | Age: 52
End: 2022-05-26
Payer: COMMERCIAL

## 2022-05-26 VITALS — SYSTOLIC BLOOD PRESSURE: 140 MMHG | HEART RATE: 97 BPM | DIASTOLIC BLOOD PRESSURE: 98 MMHG

## 2022-05-26 DIAGNOSIS — G43.719 CHRONIC MIGRAINE WITHOUT AURA, INTRACTABLE, WITHOUT STATUS MIGRAINOSUS: Primary | ICD-10-CM

## 2022-05-26 PROCEDURE — 99205 OFFICE O/P NEW HI 60 MIN: CPT | Performed by: PHYSICAL MEDICINE & REHABILITATION

## 2022-05-26 RX ORDER — PROPRANOLOL HYDROCHLORIDE 80 MG/1
80 TABLET ORAL 3 TIMES DAILY
Qty: 90 TABLET | Refills: 3 | Status: SHIPPED | OUTPATIENT
Start: 2022-05-26 | End: 2022-09-22

## 2022-05-26 RX ORDER — AMITRIPTYLINE HYDROCHLORIDE 50 MG/1
TABLET ORAL
Qty: 60 TABLET | Refills: 3 | Status: SHIPPED | OUTPATIENT
Start: 2022-05-26 | End: 2022-09-19

## 2022-05-26 RX ORDER — RIZATRIPTAN BENZOATE 10 MG/1
10 TABLET ORAL
Qty: 10 TABLET | Refills: 3 | Status: SHIPPED | OUTPATIENT
Start: 2022-05-26 | End: 2023-03-07

## 2022-05-26 NOTE — TELEPHONE ENCOUNTER
Refill request for Amitriptyline. Pt last seen 5/20/22  And has follow up scheduled for 6/14/22. Will send in refills.     Kirsten Segura RN on 5/26/2022 at 8:56 AM
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
## 2022-05-31 NOTE — ADDENDUM NOTE
Encounter addended by: Rachael Gomes, OT on: 5/31/2022 12:52 PM   Actions taken: Charge Capture section accepted

## 2022-06-01 ENCOUNTER — HOSPITAL ENCOUNTER (OUTPATIENT)
Dept: SPEECH THERAPY | Facility: CLINIC | Age: 52
Discharge: HOME | End: 2022-06-01
Payer: COMMERCIAL

## 2022-06-01 DIAGNOSIS — S06.0X0A CONCUSSION WITHOUT LOSS OF CONSCIOUSNESS, INITIAL ENCOUNTER: Primary | ICD-10-CM

## 2022-06-01 PROCEDURE — 97129 THER IVNTJ 1ST 15 MIN: CPT | Mod: GN | Performed by: SPEECH-LANGUAGE PATHOLOGIST

## 2022-06-08 ENCOUNTER — OFFICE VISIT (OUTPATIENT)
Dept: AUDIOLOGY | Facility: CLINIC | Age: 52
End: 2022-06-08
Attending: NURSE PRACTITIONER
Payer: COMMERCIAL

## 2022-06-08 DIAGNOSIS — H93.13 TINNITUS OF BOTH EARS: ICD-10-CM

## 2022-06-08 DIAGNOSIS — H91.90 HEARING DISORDER, UNSPECIFIED LATERALITY: ICD-10-CM

## 2022-06-08 DIAGNOSIS — F07.81 POST CONCUSSION SYNDROME: ICD-10-CM

## 2022-06-08 DIAGNOSIS — H90.3 SENSORINEURAL HEARING LOSS, BILATERAL: Primary | ICD-10-CM

## 2022-06-08 PROCEDURE — 92557 COMPREHENSIVE HEARING TEST: CPT | Performed by: AUDIOLOGIST

## 2022-06-08 PROCEDURE — 92550 TYMPANOMETRY & REFLEX THRESH: CPT | Performed by: AUDIOLOGIST
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
## 2022-06-14 ENCOUNTER — VIRTUAL VISIT (OUTPATIENT)
Dept: NEUROLOGY | Facility: CLINIC | Age: 52
End: 2022-06-14
Payer: COMMERCIAL

## 2022-06-14 DIAGNOSIS — F07.81 POST CONCUSSION SYNDROME: Primary | ICD-10-CM

## 2022-06-14 DIAGNOSIS — R41.840 ATTENTION AND CONCENTRATION DEFICIT: ICD-10-CM

## 2022-06-14 PROCEDURE — 99215 OFFICE O/P EST HI 40 MIN: CPT | Mod: GT | Performed by: NURSE PRACTITIONER
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
## 2022-06-15 ENCOUNTER — HOSPITAL ENCOUNTER (OUTPATIENT)
Dept: SPEECH THERAPY | Facility: CLINIC | Age: 52
Discharge: HOME | End: 2022-06-15
Payer: COMMERCIAL

## 2022-06-15 ENCOUNTER — HOSPITAL ENCOUNTER (OUTPATIENT)
Dept: OCCUPATIONAL THERAPY | Facility: CLINIC | Age: 52
Discharge: HOME | End: 2022-06-15

## 2022-06-15 DIAGNOSIS — Z78.9 IMPAIRED INSTRUMENTAL ACTIVITIES OF DAILY LIVING (IADL): ICD-10-CM

## 2022-06-15 DIAGNOSIS — Z78.9 DECREASED ACTIVITIES OF DAILY LIVING (ADL): ICD-10-CM

## 2022-06-15 DIAGNOSIS — G44.309 POST-CONCUSSION HEADACHE: Primary | ICD-10-CM

## 2022-06-15 DIAGNOSIS — S06.0X0A CONCUSSION WITHOUT LOSS OF CONSCIOUSNESS, INITIAL ENCOUNTER: Primary | ICD-10-CM

## 2022-06-15 DIAGNOSIS — R42 DIZZINESS: ICD-10-CM

## 2022-06-15 PROCEDURE — 97535 SELF CARE MNGMENT TRAINING: CPT | Mod: GO | Performed by: OCCUPATIONAL THERAPIST

## 2022-06-15 PROCEDURE — 97130 THER IVNTJ EA ADDL 15 MIN: CPT | Mod: GN | Performed by: SPEECH-LANGUAGE PATHOLOGIST

## 2022-06-15 PROCEDURE — 97129 THER IVNTJ 1ST 15 MIN: CPT | Mod: GN | Performed by: SPEECH-LANGUAGE PATHOLOGIST

## 2022-06-17 NOTE — ADDENDUM NOTE
Encounter addended by: Rakesh Dangelo, SLP on: 6/17/2022 12:07 PM   Actions taken: Flowsheet accepted

## 2022-06-21 ENCOUNTER — MYC REFILL (OUTPATIENT)
Dept: NEUROLOGY | Facility: CLINIC | Age: 52
End: 2022-06-21
Payer: COMMERCIAL

## 2022-06-21 DIAGNOSIS — R41.840 ATTENTION AND CONCENTRATION DEFICIT: ICD-10-CM

## 2022-06-21 DIAGNOSIS — F07.81 POST CONCUSSION SYNDROME: ICD-10-CM

## 2022-06-21 RX ORDER — LISDEXAMFETAMINE DIMESYLATE 50 MG/1
50 CAPSULE ORAL EVERY MORNING
Qty: 30 CAPSULE | Refills: 0 | Status: SHIPPED | OUTPATIENT
Start: 2022-06-21 | End: 2022-07-06 | Stop reason: DRUGHIGH

## 2022-06-21 RX ORDER — LISDEXAMFETAMINE DIMESYLATE 60 MG/1
60 CAPSULE ORAL EVERY MORNING
Qty: 30 CAPSULE | Refills: 0 | Status: SHIPPED | OUTPATIENT
Start: 2022-06-21 | End: 2022-07-18

## 2022-06-22 ENCOUNTER — HOSPITAL ENCOUNTER (OUTPATIENT)
Dept: OCCUPATIONAL THERAPY | Facility: CLINIC | Age: 52
Discharge: HOME | End: 2022-06-22

## 2022-06-22 ENCOUNTER — HOSPITAL ENCOUNTER (OUTPATIENT)
Dept: SPEECH THERAPY | Facility: CLINIC | Age: 52
Discharge: HOME | End: 2022-06-22
Payer: COMMERCIAL

## 2022-06-22 DIAGNOSIS — S06.0X0A CONCUSSION WITHOUT LOSS OF CONSCIOUSNESS, INITIAL ENCOUNTER: Primary | ICD-10-CM

## 2022-06-22 DIAGNOSIS — Z78.9 IMPAIRED INSTRUMENTAL ACTIVITIES OF DAILY LIVING (IADL): ICD-10-CM

## 2022-06-22 DIAGNOSIS — Z78.9 DECREASED ACTIVITIES OF DAILY LIVING (ADL): ICD-10-CM

## 2022-06-22 PROCEDURE — 97535 SELF CARE MNGMENT TRAINING: CPT | Mod: GO | Performed by: OCCUPATIONAL THERAPIST

## 2022-06-22 PROCEDURE — 97130 THER IVNTJ EA ADDL 15 MIN: CPT | Mod: GN | Performed by: SPEECH-LANGUAGE PATHOLOGIST

## 2022-06-22 PROCEDURE — 97129 THER IVNTJ 1ST 15 MIN: CPT | Mod: GN | Performed by: SPEECH-LANGUAGE PATHOLOGIST

## 2022-06-29 ENCOUNTER — HOSPITAL ENCOUNTER (OUTPATIENT)
Dept: OCCUPATIONAL THERAPY | Facility: CLINIC | Age: 52
Discharge: HOME | End: 2022-06-29
Payer: COMMERCIAL

## 2022-06-29 ENCOUNTER — HOSPITAL ENCOUNTER (OUTPATIENT)
Dept: SPEECH THERAPY | Facility: CLINIC | Age: 52
Discharge: HOME | End: 2022-06-29
Payer: COMMERCIAL

## 2022-06-29 DIAGNOSIS — Z78.9 DECREASED ACTIVITIES OF DAILY LIVING (ADL): ICD-10-CM

## 2022-06-29 DIAGNOSIS — S06.0X0A CONCUSSION WITHOUT LOSS OF CONSCIOUSNESS, INITIAL ENCOUNTER: Primary | ICD-10-CM

## 2022-06-29 DIAGNOSIS — Z78.9 IMPAIRED INSTRUMENTAL ACTIVITIES OF DAILY LIVING (IADL): ICD-10-CM

## 2022-06-29 PROCEDURE — 97535 SELF CARE MNGMENT TRAINING: CPT | Mod: GO | Performed by: OCCUPATIONAL THERAPIST

## 2022-06-29 PROCEDURE — 97129 THER IVNTJ 1ST 15 MIN: CPT | Mod: GN | Performed by: SPEECH-LANGUAGE PATHOLOGIST

## 2022-06-29 PROCEDURE — 97130 THER IVNTJ EA ADDL 15 MIN: CPT | Mod: GN | Performed by: SPEECH-LANGUAGE PATHOLOGIST

## 2022-07-05 ENCOUNTER — MYC REFILL (OUTPATIENT)
Dept: NEUROLOGY | Facility: CLINIC | Age: 52
End: 2022-07-05

## 2022-07-05 DIAGNOSIS — G44.309 POST-CONCUSSION HEADACHE: ICD-10-CM

## 2022-07-05 DIAGNOSIS — R41.840 ATTENTION AND CONCENTRATION DEFICIT: ICD-10-CM

## 2022-07-05 DIAGNOSIS — F07.81 POST CONCUSSION SYNDROME: ICD-10-CM

## 2022-07-06 ENCOUNTER — HOSPITAL ENCOUNTER (OUTPATIENT)
Dept: SPEECH THERAPY | Facility: CLINIC | Age: 52
Discharge: HOME | End: 2022-07-06
Payer: COMMERCIAL

## 2022-07-06 DIAGNOSIS — M54.2 NECK PAIN: Primary | ICD-10-CM

## 2022-07-06 PROCEDURE — 97129 THER IVNTJ 1ST 15 MIN: CPT | Mod: GN | Performed by: SPEECH-LANGUAGE PATHOLOGIST

## 2022-07-06 PROCEDURE — 97130 THER IVNTJ EA ADDL 15 MIN: CPT | Mod: GN | Performed by: SPEECH-LANGUAGE PATHOLOGIST

## 2022-07-06 RX ORDER — METHYLPHENIDATE HYDROCHLORIDE 10 MG/1
10 TABLET ORAL 3 TIMES DAILY
Qty: 90 TABLET | Refills: 0 | Status: SHIPPED | OUTPATIENT
Start: 2022-07-06 | End: 2022-07-18

## 2022-07-07 DIAGNOSIS — Z91.81 PERSONAL HISTORY OF FALL: ICD-10-CM

## 2022-07-07 DIAGNOSIS — M54.2 NECK PAIN: Primary | ICD-10-CM

## 2022-07-11 ENCOUNTER — HOSPITAL ENCOUNTER (OUTPATIENT)
Dept: PHYSICAL THERAPY | Facility: CLINIC | Age: 52
Discharge: HOME | End: 2022-07-11
Payer: COMMERCIAL

## 2022-07-11 DIAGNOSIS — R26.89 BALANCE PROBLEMS: ICD-10-CM

## 2022-07-11 DIAGNOSIS — M54.2 NECK PAIN: Primary | ICD-10-CM

## 2022-07-11 DIAGNOSIS — G44.309 POST-CONCUSSION HEADACHE: ICD-10-CM

## 2022-07-11 DIAGNOSIS — R42 DIZZINESS: ICD-10-CM

## 2022-07-11 PROCEDURE — 97535 SELF CARE MNGMENT TRAINING: CPT | Mod: GP | Performed by: PHYSICAL THERAPIST

## 2022-07-11 PROCEDURE — 97162 PT EVAL MOD COMPLEX 30 MIN: CPT | Mod: GP | Performed by: PHYSICAL THERAPIST

## 2022-07-11 PROCEDURE — 97140 MANUAL THERAPY 1/> REGIONS: CPT | Mod: GP | Performed by: PHYSICAL THERAPIST
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
## 2022-07-13 ENCOUNTER — HOSPITAL ENCOUNTER (OUTPATIENT)
Dept: SPEECH THERAPY | Facility: CLINIC | Age: 52
Discharge: HOME | End: 2022-07-13
Payer: COMMERCIAL

## 2022-07-13 ENCOUNTER — HOSPITAL ENCOUNTER (OUTPATIENT)
Dept: OCCUPATIONAL THERAPY | Facility: CLINIC | Age: 52
Discharge: HOME | End: 2022-07-13

## 2022-07-13 DIAGNOSIS — Z78.9 DECREASED ACTIVITIES OF DAILY LIVING (ADL): ICD-10-CM

## 2022-07-13 DIAGNOSIS — S06.0X0A CONCUSSION WITHOUT LOSS OF CONSCIOUSNESS, INITIAL ENCOUNTER: Primary | ICD-10-CM

## 2022-07-13 DIAGNOSIS — Z78.9 IMPAIRED INSTRUMENTAL ACTIVITIES OF DAILY LIVING (IADL): ICD-10-CM

## 2022-07-13 PROCEDURE — 97129 THER IVNTJ 1ST 15 MIN: CPT | Mod: GN | Performed by: SPEECH-LANGUAGE PATHOLOGIST

## 2022-07-13 PROCEDURE — 97110 THERAPEUTIC EXERCISES: CPT | Mod: GO | Performed by: OCCUPATIONAL THERAPIST

## 2022-07-13 PROCEDURE — 97535 SELF CARE MNGMENT TRAINING: CPT | Mod: GO | Performed by: OCCUPATIONAL THERAPIST

## 2022-07-13 PROCEDURE — 97130 THER IVNTJ EA ADDL 15 MIN: CPT | Mod: GN | Performed by: SPEECH-LANGUAGE PATHOLOGIST

## 2022-07-18 ENCOUNTER — MYC REFILL (OUTPATIENT)
Dept: NEUROLOGY | Facility: CLINIC | Age: 52
End: 2022-07-18

## 2022-07-18 ENCOUNTER — HOSPITAL ENCOUNTER (OUTPATIENT)
Dept: PHYSICAL THERAPY | Facility: CLINIC | Age: 52
Discharge: HOME | End: 2022-07-18
Payer: COMMERCIAL

## 2022-07-18 DIAGNOSIS — F07.81 POST CONCUSSION SYNDROME: ICD-10-CM

## 2022-07-18 DIAGNOSIS — R42 DIZZINESS: Primary | ICD-10-CM

## 2022-07-18 DIAGNOSIS — G44.309 POST-CONCUSSION HEADACHE: ICD-10-CM

## 2022-07-18 DIAGNOSIS — R26.89 BALANCE PROBLEMS: ICD-10-CM

## 2022-07-18 DIAGNOSIS — R41.840 ATTENTION AND CONCENTRATION DEFICIT: ICD-10-CM

## 2022-07-18 DIAGNOSIS — M54.2 NECK PAIN: ICD-10-CM

## 2022-07-18 PROCEDURE — 97112 NEUROMUSCULAR REEDUCATION: CPT | Mod: GP | Performed by: PHYSICAL THERAPIST

## 2022-07-18 RX ORDER — METHYLPHENIDATE HYDROCHLORIDE 10 MG/1
10 TABLET ORAL 3 TIMES DAILY
Qty: 90 TABLET | Refills: 0 | Status: SHIPPED | OUTPATIENT
Start: 2022-07-18 | End: 2022-08-14

## 2022-07-18 RX ORDER — LISDEXAMFETAMINE DIMESYLATE 60 MG/1
60 CAPSULE ORAL EVERY MORNING
Qty: 30 CAPSULE | Refills: 0 | Status: SHIPPED | OUTPATIENT
Start: 2022-07-18 | End: 2022-08-14 | Stop reason: DRUGHIGH

## 2022-07-20 ENCOUNTER — HOSPITAL ENCOUNTER (OUTPATIENT)
Dept: SPEECH THERAPY | Facility: CLINIC | Age: 52
Discharge: HOME | End: 2022-07-20
Payer: COMMERCIAL

## 2022-07-20 PROCEDURE — 97129 THER IVNTJ 1ST 15 MIN: CPT | Mod: GN | Performed by: SPEECH-LANGUAGE PATHOLOGIST

## 2022-07-20 PROCEDURE — 97130 THER IVNTJ EA ADDL 15 MIN: CPT | Mod: GN | Performed by: SPEECH-LANGUAGE PATHOLOGIST

## 2022-07-21 ENCOUNTER — VIRTUAL VISIT (OUTPATIENT)
Dept: NEUROLOGY | Facility: CLINIC | Age: 52
End: 2022-07-21
Payer: COMMERCIAL

## 2022-07-21 DIAGNOSIS — F07.81 POST CONCUSSION SYNDROME: Primary | ICD-10-CM

## 2022-07-21 DIAGNOSIS — R41.840 ATTENTION AND CONCENTRATION DEFICIT: ICD-10-CM

## 2022-07-21 PROCEDURE — 99215 OFFICE O/P EST HI 40 MIN: CPT | Mod: GT | Performed by: NURSE PRACTITIONER

## 2022-07-21 RX ORDER — LISDEXAMFETAMINE DIMESYLATE 70 MG/1
70 CAPSULE ORAL EVERY MORNING
Qty: 30 CAPSULE | Refills: 0 | Status: SHIPPED | OUTPATIENT
Start: 2022-07-21 | End: 2022-08-23

## 2022-07-21 NOTE — NURSING NOTE
Chief Complaint   Patient presents with     Concussion     Follow-up.  Needs a work note.  C/o Headaches, difficulty concentrating, poor/lack of balance       PhoYANA Haines on 7/21/2022 at 10:24 AM

## 2022-07-21 NOTE — PROGRESS NOTES
How would you like to obtain your AVS? Biodel  If the video is dropped, the invitation should be resent by: [phone]
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
## 2022-07-25 ENCOUNTER — HOSPITAL ENCOUNTER (OUTPATIENT)
Dept: PHYSICAL THERAPY | Facility: CLINIC | Age: 52
Discharge: HOME | End: 2022-07-25
Payer: COMMERCIAL

## 2022-07-25 ENCOUNTER — MYC REFILL (OUTPATIENT)
Dept: FAMILY MEDICINE | Facility: CLINIC | Age: 52
End: 2022-07-25

## 2022-07-25 ENCOUNTER — MYC MEDICAL ADVICE (OUTPATIENT)
Dept: FAMILY MEDICINE | Facility: CLINIC | Age: 52
End: 2022-07-25

## 2022-07-25 DIAGNOSIS — M54.2 NECK PAIN: ICD-10-CM

## 2022-07-25 DIAGNOSIS — G44.309 POST-CONCUSSION HEADACHE: ICD-10-CM

## 2022-07-25 DIAGNOSIS — B35.1 TOENAIL FUNGUS: ICD-10-CM

## 2022-07-25 DIAGNOSIS — R42 DIZZINESS: Primary | ICD-10-CM

## 2022-07-25 DIAGNOSIS — R26.89 BALANCE PROBLEMS: ICD-10-CM

## 2022-07-25 PROCEDURE — 97112 NEUROMUSCULAR REEDUCATION: CPT | Mod: GP | Performed by: PHYSICAL THERAPIST

## 2022-07-25 PROCEDURE — 97140 MANUAL THERAPY 1/> REGIONS: CPT | Mod: GP | Performed by: PHYSICAL THERAPIST

## 2022-07-26 RX ORDER — TERBINAFINE HYDROCHLORIDE 250 MG/1
250 TABLET ORAL DAILY
Qty: 90 TABLET | Refills: 0 | OUTPATIENT
Start: 2022-07-26

## 2022-07-26 NOTE — TELEPHONE ENCOUNTER
Not sure if this a refill or new request.  Either way appointment would be needed to discuss.  PCP is gone- please call patient.      Brandon

## 2022-07-26 NOTE — TELEPHONE ENCOUNTER
See    There is a refill encounter.     From 4/27/22 visit   . Toenail fungus  Recurrent.  Recent normal LFTs.  Okay to restart, will recheck hepatic panel in 6 mos.   - terbinafine (LAMISIL) 250 MG tablet; Take 1 tablet (250 mg) by mouth daily  Dispense: 90 tablet; Refill: 0  - Hepatic panel (Albumin, ALT, AST, Bili, Alk Phos, TP); Future    Myra RAYMUNDO RN

## 2022-07-27 ENCOUNTER — HOSPITAL ENCOUNTER (OUTPATIENT)
Dept: SPEECH THERAPY | Facility: CLINIC | Age: 52
Discharge: HOME | End: 2022-07-27
Payer: COMMERCIAL

## 2022-07-27 PROCEDURE — 97130 THER IVNTJ EA ADDL 15 MIN: CPT | Mod: GN | Performed by: SPEECH-LANGUAGE PATHOLOGIST

## 2022-07-27 PROCEDURE — 97129 THER IVNTJ 1ST 15 MIN: CPT | Mod: GN | Performed by: SPEECH-LANGUAGE PATHOLOGIST

## 2022-08-01 ENCOUNTER — HOSPITAL ENCOUNTER (OUTPATIENT)
Dept: MRI IMAGING | Facility: CLINIC | Age: 52
Discharge: HOME | End: 2022-08-01
Attending: NURSE PRACTITIONER | Admitting: NURSE PRACTITIONER
Payer: COMMERCIAL

## 2022-08-01 DIAGNOSIS — M54.2 NECK PAIN: ICD-10-CM

## 2022-08-01 DIAGNOSIS — Z91.81 PERSONAL HISTORY OF FALL: ICD-10-CM

## 2022-08-01 PROCEDURE — 72141 MRI NECK SPINE W/O DYE: CPT

## 2022-08-01 NOTE — TELEPHONE ENCOUNTER
If the new growth is normal he does not need to continue medication.  The infection was treated, but will take time for new nail to fully grow out.      Abby Fuentes CNP

## 2022-08-08 ENCOUNTER — HOSPITAL ENCOUNTER (OUTPATIENT)
Dept: PHYSICAL THERAPY | Facility: CLINIC | Age: 52
Discharge: HOME | End: 2022-08-08
Payer: COMMERCIAL

## 2022-08-08 DIAGNOSIS — M54.2 NECK PAIN: ICD-10-CM

## 2022-08-08 DIAGNOSIS — R42 DIZZINESS: Primary | ICD-10-CM

## 2022-08-08 DIAGNOSIS — G44.309 POST-CONCUSSION HEADACHE: ICD-10-CM

## 2022-08-08 DIAGNOSIS — R26.89 BALANCE PROBLEMS: ICD-10-CM

## 2022-08-08 PROCEDURE — 97112 NEUROMUSCULAR REEDUCATION: CPT | Mod: GP | Performed by: PHYSICAL THERAPIST

## 2022-08-09 NOTE — PROGRESS NOTES
[facility]    Outpatient Speech Language Pathology Progress Note  Patient: Westley Singh  : 1970    Beginning/End Dates of Reporting Period:  22 to     Referring Provider: RUI Muhammad-MANJULA    Therapy Diagnosis: Mild to moderate cognitive linguisic impairments secondary to post concussion syndrome    Client Self Report: Pt is a 52 y.o. male who completed an OP SLP evaluation on 22, please see the EMR for further information. Pt reports sustaining a 2nd head injury falling off a ladder which has results in a regression for cognitive linguistic skills and a worsening in symptoms. Symptoms post session have been so heightened is Pt needing to rest in dark tx room for ~15 min and then needed a rest again when walking out into the waiting room. Following last session Pt felt he was unsafe to drive and called his wife for a ride home.       Outcome Measures (most recent score):        Goals:  Goal Identifier LTG 1-Cognition-Memory   Goal Description Patient will learn, implement and demonstrate use of 3 internal or external memory support strategies to complete a moderate to complex level recall task (e.g. recall for 24 hours, listen to a 10 minute conversation and recall information to answer questions) with 80% accy and minimal increase in concussion symptoms in order to remember plans and meetings throughout his workday.    Target Date 22-Extend goal 22   Date Met      Progress (detail required for progress note):  Pt demonstrating % for complex level tasks with significant increases in post concussion symptoms. Pt needing frequent breaks and experiencing significant HA and dizziness following sessions. Continue goal.      Goal Identifier LTG 2-Cognition-Logic   Goal Description Pt will complete moderate verbal/written reasoning skills for sequencing/daily planning needs  with 80% accuracy and minimal increase in concussion symptoms per Pt report.    Target Date 07/29/22-Extend goal 11/7/22   Date Met      Progress (detail required for progress note):  Pt demonstrating 80% with significant increase in symptoms, having to rest in the waiting room, with head down and sip water before being able to progress to the parking lot.      Goal Identifier LTG 3A-Attention   Goal Description Pt will complete a complex level divided attention task with <3 cues or errors noted and a minimal increase in post concussion symptoms in order to listen and take notes while in meetings at work.   Target Date 07/29/22-Extend goal 11/7/222   Date Met      Progress (detail required for progress note):  Pt able to verbalize recommendations for distraction reduction and symptom management to support attn, in session Pt is only able to tolerate tasks for short 5-10 minute increments w/o distractions d/t increased symptoms. Continue goal.          Plan:  Continue therapy per current plan of care. Pt will continue to benefit from skilled intervention at a recommended frequency of 2x/week (Pt able to do 1x/week now) for an additional 90 days.     Discharge:  No    Thank you for referring Westley Singh to outpatient therapy at [facility]. Please call Pooja Baker MA, SLP-CCC at [phone] email [email or web address] with any questions or concerns.      Pooja Baker M.A., SLP-CCC  Speech-Language Pathologist

## 2022-08-09 NOTE — ADDENDUM NOTE
Encounter addended by: Pooja Baker, SLP on: 8/9/2022 12:20 PM   Actions taken: Clinical Note Signed, Flowsheet data copied forward, Flowsheet accepted

## 2022-08-10 ENCOUNTER — HOSPITAL ENCOUNTER (OUTPATIENT)
Dept: SPEECH THERAPY | Facility: CLINIC | Age: 52
Discharge: HOME | End: 2022-08-10
Payer: COMMERCIAL

## 2022-08-10 PROCEDURE — 97129 THER IVNTJ 1ST 15 MIN: CPT | Mod: GN | Performed by: SPEECH-LANGUAGE PATHOLOGIST

## 2022-08-10 PROCEDURE — 97130 THER IVNTJ EA ADDL 15 MIN: CPT | Mod: GN | Performed by: SPEECH-LANGUAGE PATHOLOGIST

## 2022-08-14 ENCOUNTER — MYC REFILL (OUTPATIENT)
Dept: NEUROLOGY | Facility: CLINIC | Age: 52
End: 2022-08-14

## 2022-08-14 DIAGNOSIS — G44.309 POST-CONCUSSION HEADACHE: ICD-10-CM

## 2022-08-14 DIAGNOSIS — R41.840 ATTENTION AND CONCENTRATION DEFICIT: ICD-10-CM

## 2022-08-14 DIAGNOSIS — F07.81 POST CONCUSSION SYNDROME: ICD-10-CM

## 2022-08-14 RX ORDER — METHYLPHENIDATE HYDROCHLORIDE 10 MG/1
10 TABLET ORAL 3 TIMES DAILY
Qty: 90 TABLET | Refills: 0 | Status: SHIPPED | OUTPATIENT
Start: 2022-08-14 | End: 2022-09-26

## 2022-08-17 ENCOUNTER — HOSPITAL ENCOUNTER (OUTPATIENT)
Dept: SPEECH THERAPY | Facility: CLINIC | Age: 52
Discharge: HOME | End: 2022-08-17
Payer: COMMERCIAL

## 2022-08-17 PROCEDURE — 97130 THER IVNTJ EA ADDL 15 MIN: CPT | Mod: GN | Performed by: SPEECH-LANGUAGE PATHOLOGIST

## 2022-08-17 PROCEDURE — 97129 THER IVNTJ 1ST 15 MIN: CPT | Mod: GN | Performed by: SPEECH-LANGUAGE PATHOLOGIST

## 2022-08-22 ENCOUNTER — HOSPITAL ENCOUNTER (OUTPATIENT)
Dept: PHYSICAL THERAPY | Facility: CLINIC | Age: 52
Discharge: HOME | End: 2022-08-22
Payer: COMMERCIAL

## 2022-08-22 DIAGNOSIS — R42 DIZZINESS: Primary | ICD-10-CM

## 2022-08-22 DIAGNOSIS — M54.2 NECK PAIN: ICD-10-CM

## 2022-08-22 DIAGNOSIS — R26.89 BALANCE PROBLEMS: ICD-10-CM

## 2022-08-22 DIAGNOSIS — G44.309 POST-CONCUSSION HEADACHE: ICD-10-CM

## 2022-08-22 PROCEDURE — 97112 NEUROMUSCULAR REEDUCATION: CPT | Mod: GP | Performed by: PHYSICAL THERAPIST

## 2022-08-23 ENCOUNTER — MYC REFILL (OUTPATIENT)
Dept: NEUROLOGY | Facility: CLINIC | Age: 52
End: 2022-08-23

## 2022-08-23 DIAGNOSIS — R41.840 ATTENTION AND CONCENTRATION DEFICIT: ICD-10-CM

## 2022-08-23 DIAGNOSIS — F07.81 POST CONCUSSION SYNDROME: ICD-10-CM

## 2022-08-23 RX ORDER — LISDEXAMFETAMINE DIMESYLATE 70 MG/1
70 CAPSULE ORAL EVERY MORNING
Qty: 30 CAPSULE | Refills: 0 | Status: SHIPPED | OUTPATIENT
Start: 2022-08-23 | End: 2022-09-21

## 2022-08-24 ENCOUNTER — HOSPITAL ENCOUNTER (OUTPATIENT)
Dept: SPEECH THERAPY | Facility: CLINIC | Age: 52
Discharge: HOME | End: 2022-08-24
Payer: COMMERCIAL

## 2022-08-24 PROCEDURE — 97130 THER IVNTJ EA ADDL 15 MIN: CPT | Mod: GN | Performed by: SPEECH-LANGUAGE PATHOLOGIST

## 2022-08-24 PROCEDURE — 97129 THER IVNTJ 1ST 15 MIN: CPT | Mod: GN | Performed by: SPEECH-LANGUAGE PATHOLOGIST

## 2022-08-27 DIAGNOSIS — G47.00 INSOMNIA, UNSPECIFIED TYPE: ICD-10-CM

## 2022-08-29 ENCOUNTER — HOSPITAL ENCOUNTER (OUTPATIENT)
Dept: PHYSICAL THERAPY | Facility: CLINIC | Age: 52
Discharge: HOME | End: 2022-08-29
Payer: COMMERCIAL

## 2022-08-29 DIAGNOSIS — G44.309 POST-CONCUSSION HEADACHE: ICD-10-CM

## 2022-08-29 DIAGNOSIS — R26.89 BALANCE PROBLEMS: ICD-10-CM

## 2022-08-29 DIAGNOSIS — R42 DIZZINESS: Primary | ICD-10-CM

## 2022-08-29 DIAGNOSIS — M54.2 NECK PAIN: ICD-10-CM

## 2022-08-29 PROCEDURE — 97112 NEUROMUSCULAR REEDUCATION: CPT | Mod: GP | Performed by: PHYSICAL THERAPIST

## 2022-08-29 RX ORDER — MIRTAZAPINE 7.5 MG/1
TABLET, FILM COATED ORAL
Qty: 30 TABLET | Refills: 4 | Status: SHIPPED | OUTPATIENT
Start: 2022-08-29 | End: 2023-04-12

## 2022-08-29 NOTE — TELEPHONE ENCOUNTER
Pt of Erum    Medication refill request for mirtazapine (REMERON) 7.5 MG tablet. Last refill was on 5/12/22.    Patient was last seen on July 21, 2022.  Patient's next appointment is scheduled with Julián on September 21, 2022.    Medication T'd for review and signature    YANA Coombs on 8/29/2022 at 7:31 AM

## 2022-09-07 ENCOUNTER — HOSPITAL ENCOUNTER (OUTPATIENT)
Dept: SPEECH THERAPY | Facility: CLINIC | Age: 52
Discharge: HOME | End: 2022-09-07
Payer: COMMERCIAL

## 2022-09-07 PROCEDURE — 97129 THER IVNTJ 1ST 15 MIN: CPT | Mod: GN | Performed by: SPEECH-LANGUAGE PATHOLOGIST

## 2022-09-07 PROCEDURE — 97130 THER IVNTJ EA ADDL 15 MIN: CPT | Mod: GN | Performed by: SPEECH-LANGUAGE PATHOLOGIST

## 2022-09-12 ENCOUNTER — HOSPITAL ENCOUNTER (OUTPATIENT)
Dept: PHYSICAL THERAPY | Facility: CLINIC | Age: 52
Discharge: HOME | End: 2022-09-12
Payer: COMMERCIAL

## 2022-09-12 DIAGNOSIS — M54.2 NECK PAIN: ICD-10-CM

## 2022-09-12 DIAGNOSIS — G44.309 POST-CONCUSSION HEADACHE: ICD-10-CM

## 2022-09-12 DIAGNOSIS — R42 DIZZINESS: Primary | ICD-10-CM

## 2022-09-12 DIAGNOSIS — R26.89 BALANCE PROBLEMS: ICD-10-CM

## 2022-09-12 PROCEDURE — 97112 NEUROMUSCULAR REEDUCATION: CPT | Mod: GP | Performed by: PHYSICAL THERAPIST

## 2022-09-19 DIAGNOSIS — G43.719 CHRONIC MIGRAINE WITHOUT AURA, INTRACTABLE, WITHOUT STATUS MIGRAINOSUS: ICD-10-CM

## 2022-09-19 DIAGNOSIS — F07.81 POST CONCUSSION SYNDROME: ICD-10-CM

## 2022-09-19 DIAGNOSIS — G47.00 INSOMNIA, UNSPECIFIED TYPE: ICD-10-CM

## 2022-09-19 RX ORDER — AMITRIPTYLINE HYDROCHLORIDE 50 MG/1
TABLET ORAL
Qty: 60 TABLET | Refills: 0 | Status: SHIPPED | OUTPATIENT
Start: 2022-09-19 | End: 2022-10-18

## 2022-09-19 NOTE — TELEPHONE ENCOUNTER
Refill request for amitriptyline 50 mg  Last follow-up 7/21/22; next follow-up 9/21  Medication T'd for review and signature  Hilario Brooks, MACKENZIE ATC on 9/19/2022 at 9:49 AM    amitriptyline (ELAVIL) 50 MG tablet 60 tablet 3 5/26/2022  No   Sig: TAKE 1 TO 2 TABLET BY MOUTH AT BEDTIME

## 2022-09-20 ENCOUNTER — HOSPITAL ENCOUNTER (OUTPATIENT)
Dept: SPEECH THERAPY | Facility: CLINIC | Age: 52
Discharge: HOME | End: 2022-09-20
Payer: COMMERCIAL

## 2022-09-20 DIAGNOSIS — F33.1 MAJOR DEPRESSIVE DISORDER, RECURRENT EPISODE, MODERATE (H): ICD-10-CM

## 2022-09-20 DIAGNOSIS — F06.30 MOOD DISORDER AS LATE EFFECT OF TRAUMATIC BRAIN INJURY (H): Primary | ICD-10-CM

## 2022-09-20 DIAGNOSIS — F41.9 ANXIETY: ICD-10-CM

## 2022-09-20 DIAGNOSIS — S06.9XAS MOOD DISORDER AS LATE EFFECT OF TRAUMATIC BRAIN INJURY (H): Primary | ICD-10-CM

## 2022-09-20 PROCEDURE — 97129 THER IVNTJ 1ST 15 MIN: CPT | Mod: GN | Performed by: SPEECH-LANGUAGE PATHOLOGIST

## 2022-09-20 PROCEDURE — 97130 THER IVNTJ EA ADDL 15 MIN: CPT | Mod: GN | Performed by: SPEECH-LANGUAGE PATHOLOGIST

## 2022-09-20 NOTE — ADDENDUM NOTE
Encounter addended by: Pooja Baker, SLP on: 9/20/2022 2:00 PM   Actions taken: Visit diagnoses modified, Order list changed, Diagnosis association updated

## 2022-09-21 ENCOUNTER — OFFICE VISIT (OUTPATIENT)
Dept: NEUROLOGY | Facility: CLINIC | Age: 52
End: 2022-09-21
Payer: COMMERCIAL

## 2022-09-21 ENCOUNTER — MYC REFILL (OUTPATIENT)
Dept: NEUROLOGY | Facility: CLINIC | Age: 52
End: 2022-09-21

## 2022-09-21 VITALS — DIASTOLIC BLOOD PRESSURE: 81 MMHG | SYSTOLIC BLOOD PRESSURE: 118 MMHG | HEART RATE: 65 BPM

## 2022-09-21 DIAGNOSIS — R41.840 ATTENTION AND CONCENTRATION DEFICIT: ICD-10-CM

## 2022-09-21 DIAGNOSIS — G47.00 INSOMNIA, UNSPECIFIED TYPE: Primary | ICD-10-CM

## 2022-09-21 DIAGNOSIS — F07.81 POST CONCUSSION SYNDROME: ICD-10-CM

## 2022-09-21 PROCEDURE — 99215 OFFICE O/P EST HI 40 MIN: CPT | Performed by: NURSE PRACTITIONER

## 2022-09-21 RX ORDER — ESZOPICLONE 1 MG/1
1 TABLET, FILM COATED ORAL AT BEDTIME
Qty: 30 TABLET | Refills: 1 | Status: SHIPPED | OUTPATIENT
Start: 2022-09-21 | End: 2022-12-27

## 2022-09-21 NOTE — PATIENT INSTRUCTIONS
Lunesta take one tab at night, message by Monday with update, if sleep is improved we will will take away Mirtazapine     Try  Brandy/blue sunglasses  Colored light bulbs

## 2022-09-21 NOTE — NURSING NOTE
Chief Complaint   Patient presents with     Concussion     Follow up visit  Headaches worsening - Has appt with Dr. Long in October  Waking up with headache  PT and SLP going well
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
## 2022-09-22 RX ORDER — PROPRANOLOL HYDROCHLORIDE 80 MG/1
TABLET ORAL
Qty: 90 TABLET | Refills: 0 | Status: SHIPPED | OUTPATIENT
Start: 2022-09-22 | End: 2022-10-18

## 2022-09-22 RX ORDER — LISDEXAMFETAMINE DIMESYLATE 70 MG/1
70 CAPSULE ORAL EVERY MORNING
Qty: 30 CAPSULE | Refills: 0 | Status: SHIPPED | OUTPATIENT
Start: 2022-09-22 | End: 2022-10-25

## 2022-09-22 NOTE — TELEPHONE ENCOUNTER
" Propranolol HCl Oral Tablet 80 MG  Take 1 tablet (80 mg) by mouth 3 times daily - Oral  Last Written Prescription Date:  5/26/22  Last Fill Quantity: 90,   # refills: 3  Last Office Visit : 5/26/22 [facility]  Future Office visit:  None    Routing refill request to provider for review/approval because:   Per 5/26/22 PMR consult:  \"He will be following with RUI Arboleda CNP for his concussion needs, and myself PRN for headache management.\"            "

## 2022-09-22 NOTE — TELEPHONE ENCOUNTER
Please advise regarding refill request for Propranolol. I am able to assist with future refills, however you have not prescribed this in past and note from 9/21 visit is not yet complete.     Thank you    Velasquez Fair RN, BSN  [facility]

## 2022-09-26 ENCOUNTER — MYC REFILL (OUTPATIENT)
Dept: NEUROLOGY | Facility: CLINIC | Age: 52
End: 2022-09-26

## 2022-09-26 ENCOUNTER — HOSPITAL ENCOUNTER (OUTPATIENT)
Dept: PHYSICAL THERAPY | Facility: CLINIC | Age: 52
Discharge: HOME | End: 2022-09-26
Payer: COMMERCIAL

## 2022-09-26 DIAGNOSIS — G44.309 POST-CONCUSSION HEADACHE: ICD-10-CM

## 2022-09-26 DIAGNOSIS — R42 DIZZINESS: Primary | ICD-10-CM

## 2022-09-26 DIAGNOSIS — F07.81 POST CONCUSSION SYNDROME: ICD-10-CM

## 2022-09-26 DIAGNOSIS — R26.89 BALANCE PROBLEMS: ICD-10-CM

## 2022-09-26 DIAGNOSIS — M54.2 NECK PAIN: ICD-10-CM

## 2022-09-26 DIAGNOSIS — R41.840 ATTENTION AND CONCENTRATION DEFICIT: ICD-10-CM

## 2022-09-26 PROCEDURE — 97112 NEUROMUSCULAR REEDUCATION: CPT | Mod: GP | Performed by: PHYSICAL THERAPIST

## 2022-09-26 RX ORDER — METHYLPHENIDATE HYDROCHLORIDE 10 MG/1
10 TABLET ORAL 3 TIMES DAILY
Qty: 90 TABLET | Refills: 0 | Status: SHIPPED | OUTPATIENT
Start: 2022-09-26 | End: 2022-11-01

## 2022-09-27 DIAGNOSIS — S06.9XAS MOOD DISORDER AS LATE EFFECT OF TRAUMATIC BRAIN INJURY (H): ICD-10-CM

## 2022-09-27 DIAGNOSIS — R41.840 ATTENTION AND CONCENTRATION DEFICIT: ICD-10-CM

## 2022-09-27 DIAGNOSIS — F07.81 POST CONCUSSION SYNDROME: ICD-10-CM

## 2022-09-27 DIAGNOSIS — F06.30 MOOD DISORDER AS LATE EFFECT OF TRAUMATIC BRAIN INJURY (H): ICD-10-CM

## 2022-09-27 RX ORDER — BUPROPION HYDROCHLORIDE 300 MG/1
TABLET ORAL
Qty: 30 TABLET | Refills: 4 | Status: SHIPPED | OUTPATIENT
Start: 2022-09-27 | End: 2023-03-28

## 2022-09-27 NOTE — TELEPHONE ENCOUNTER
Medication refill request for buPROPion (WELLBUTRIN XL) 300 MG 24 hr tablet. Last refill was on 5/11/22.    Patient was last seen on September 21, 2022.  Patient's next appointment is scheduled on:    11/16/2022 11:30 AM (Arrive by 11:15 AM) [facility]     Medication T'd for review and signature    YANA Coombs on 9/27/2022 at 10:22 AM

## 2022-10-12 ENCOUNTER — HOSPITAL ENCOUNTER (OUTPATIENT)
Dept: SPEECH THERAPY | Facility: CLINIC | Age: 52
Discharge: HOME | End: 2022-10-12
Payer: COMMERCIAL

## 2022-10-12 PROCEDURE — 97129 THER IVNTJ 1ST 15 MIN: CPT | Mod: GN | Performed by: SPEECH-LANGUAGE PATHOLOGIST

## 2022-10-12 PROCEDURE — 97130 THER IVNTJ EA ADDL 15 MIN: CPT | Mod: GN | Performed by: SPEECH-LANGUAGE PATHOLOGIST

## 2022-10-17 DIAGNOSIS — F07.81 POST CONCUSSION SYNDROME: ICD-10-CM

## 2022-10-17 DIAGNOSIS — G47.00 INSOMNIA, UNSPECIFIED TYPE: ICD-10-CM

## 2022-10-17 DIAGNOSIS — G43.719 CHRONIC MIGRAINE WITHOUT AURA, INTRACTABLE, WITHOUT STATUS MIGRAINOSUS: ICD-10-CM

## 2022-10-18 RX ORDER — PROPRANOLOL HYDROCHLORIDE 80 MG/1
TABLET ORAL
Qty: 90 TABLET | Refills: 0 | Status: SHIPPED | OUTPATIENT
Start: 2022-10-18 | End: 2022-11-14

## 2022-10-18 RX ORDER — AMITRIPTYLINE HYDROCHLORIDE 50 MG/1
TABLET ORAL
Qty: 60 TABLET | Refills: 0 | Status: SHIPPED | OUTPATIENT
Start: 2022-10-18 | End: 2022-11-14

## 2022-10-18 NOTE — TELEPHONE ENCOUNTER
Refill request for propranolol 80 mg and amitriptyline 50 mg  Last follow-up 9/21/22; next follow-up 11/16/22  Medication T'd for review and signature  MACKENZIE Fish ATC on 10/18/2022 at 8:05 AM    propranolol (INDERAL) 80 MG tablet 90 tablet 0 9/22/2022  No   Sig: TAKE ONE TABLET BY MOUTH THREE TIMES DAILY     amitriptyline (ELAVIL) 50 MG tablet 60 tablet 0 9/19/2022  No   Sig: TAKE 1 TO 2 TABLETS BY MOUTH AT BEDTIME

## 2022-10-18 NOTE — TELEPHONE ENCOUNTER
RN reviewed note below. Refills provided per RN refill protocol. Pt has follow up before next refills needed.     Ashley Nowak RN, BSN  [facility]    Signed Prescriptions:                        Disp   Refills    amitriptyline (ELAVIL) 50 MG tablet        60 tab*0        Sig: TAKE 1 TO 2 TABLETS BY MOUTH AT BEDTIME  Authorizing Provider: ADE POP  Ordering User: ASHLEY NOWAK    propranolol (INDERAL) 80 MG tablet         90 tab*0        Sig: TAKE ONE TABLET BY MOUTH THREE TIMES DAILY  Authorizing Provider: ADE POP  Ordering User: ASHLEY NOWAK

## 2022-10-19 ENCOUNTER — HOSPITAL ENCOUNTER (OUTPATIENT)
Dept: SPEECH THERAPY | Facility: CLINIC | Age: 52
Discharge: HOME | End: 2022-10-19
Payer: COMMERCIAL

## 2022-10-19 PROCEDURE — 97129 THER IVNTJ 1ST 15 MIN: CPT | Mod: GN | Performed by: SPEECH-LANGUAGE PATHOLOGIST

## 2022-10-19 PROCEDURE — 97130 THER IVNTJ EA ADDL 15 MIN: CPT | Mod: GN | Performed by: SPEECH-LANGUAGE PATHOLOGIST

## 2022-10-24 ENCOUNTER — HOSPITAL ENCOUNTER (OUTPATIENT)
Dept: PHYSICAL THERAPY | Facility: CLINIC | Age: 52
Discharge: HOME | End: 2022-10-24
Payer: COMMERCIAL

## 2022-10-24 DIAGNOSIS — R26.89 BALANCE PROBLEMS: ICD-10-CM

## 2022-10-24 DIAGNOSIS — R42 DIZZINESS: Primary | ICD-10-CM

## 2022-10-24 DIAGNOSIS — G44.309 POST-CONCUSSION HEADACHE: ICD-10-CM

## 2022-10-24 DIAGNOSIS — M54.2 NECK PAIN: ICD-10-CM

## 2022-10-24 PROCEDURE — 97112 NEUROMUSCULAR REEDUCATION: CPT | Mod: GP | Performed by: PHYSICAL THERAPIST

## 2022-10-25 ENCOUNTER — MYC REFILL (OUTPATIENT)
Dept: NEUROLOGY | Facility: CLINIC | Age: 52
End: 2022-10-25

## 2022-10-25 DIAGNOSIS — F07.81 POST CONCUSSION SYNDROME: ICD-10-CM

## 2022-10-25 DIAGNOSIS — R41.840 ATTENTION AND CONCENTRATION DEFICIT: ICD-10-CM

## 2022-10-25 RX ORDER — LISDEXAMFETAMINE DIMESYLATE 70 MG/1
70 CAPSULE ORAL EVERY MORNING
Qty: 30 CAPSULE | Refills: 0 | Status: SHIPPED | OUTPATIENT
Start: 2022-10-25 | End: 2022-11-25

## 2022-10-27 ENCOUNTER — OFFICE VISIT (OUTPATIENT)
Dept: PHYSICAL MEDICINE AND REHAB | Facility: CLINIC | Age: 52
End: 2022-10-27
Payer: COMMERCIAL

## 2022-10-27 ENCOUNTER — LAB (OUTPATIENT)
Dept: LAB | Facility: CLINIC | Age: 52
End: 2022-10-27
Payer: COMMERCIAL

## 2022-10-27 VITALS — DIASTOLIC BLOOD PRESSURE: 69 MMHG | HEART RATE: 87 BPM | SYSTOLIC BLOOD PRESSURE: 111 MMHG

## 2022-10-27 DIAGNOSIS — T39.1X1D ACCIDENTAL ACETAMINOPHEN OVERDOSE, SUBSEQUENT ENCOUNTER: Primary | ICD-10-CM

## 2022-10-27 DIAGNOSIS — M54.81 BILATERAL OCCIPITAL NEURALGIA: ICD-10-CM

## 2022-10-27 DIAGNOSIS — G43.719 CHRONIC MIGRAINE WITHOUT AURA, INTRACTABLE, WITHOUT STATUS MIGRAINOSUS: ICD-10-CM

## 2022-10-27 DIAGNOSIS — T39.1X1D ACCIDENTAL ACETAMINOPHEN OVERDOSE, SUBSEQUENT ENCOUNTER: ICD-10-CM

## 2022-10-27 LAB
ALBUMIN SERPL-MCNC: 3.7 G/DL (ref 3.5–5)
ALP SERPL-CCNC: 57 U/L (ref 45–120)
ALT SERPL W P-5'-P-CCNC: 22 U/L (ref 0–45)
AST SERPL W P-5'-P-CCNC: 19 U/L (ref 0–40)
BILIRUB DIRECT SERPL-MCNC: 0.1 MG/DL
BILIRUB SERPL-MCNC: 0.2 MG/DL (ref 0–1)
PROT SERPL-MCNC: 6.8 G/DL (ref 6–8)

## 2022-10-27 PROCEDURE — 36415 COLL VENOUS BLD VENIPUNCTURE: CPT

## 2022-10-27 PROCEDURE — 99214 OFFICE O/P EST MOD 30 MIN: CPT | Performed by: PHYSICAL MEDICINE & REHABILITATION

## 2022-10-27 PROCEDURE — 2894A PR INJECTION ANES AGENT AND/OR STERIOD, GREATER OCCIPITAL NERVE: CPT | Mod: RT | Performed by: PHYSICAL MEDICINE & REHABILITATION

## 2022-10-27 PROCEDURE — 80076 HEPATIC FUNCTION PANEL: CPT

## 2022-10-27 RX ORDER — TRIAMCINOLONE ACETONIDE 40 MG/ML
40 INJECTION, SUSPENSION INTRA-ARTICULAR; INTRAMUSCULAR ONCE
Status: COMPLETED | OUTPATIENT
Start: 2022-10-27 | End: 2022-10-27

## 2022-10-27 RX ORDER — BUPIVACAINE HYDROCHLORIDE 5 MG/ML
7 INJECTION, SOLUTION PERINEURAL ONCE
Status: COMPLETED | OUTPATIENT
Start: 2022-10-27 | End: 2022-10-27

## 2022-10-27 RX ORDER — TOPIRAMATE 25 MG/1
25 TABLET, FILM COATED ORAL DAILY
Qty: 30 TABLET | Refills: 1 | Status: SHIPPED | OUTPATIENT
Start: 2022-10-27 | End: 2023-01-23

## 2022-10-27 RX ADMIN — BUPIVACAINE HYDROCHLORIDE 35 MG: 5 INJECTION, SOLUTION PERINEURAL at 09:48

## 2022-10-27 RX ADMIN — TRIAMCINOLONE ACETONIDE 40 MG: 40 INJECTION, SUSPENSION INTRA-ARTICULAR; INTRAMUSCULAR at 09:48

## 2022-10-27 NOTE — LETTER
10/27/2022         RE: Westley Singh  [id number] [address] [id number]        Dear Colleague,    Thank you for referring your patient, Westley Singh, to the [facility]. Please see a copy of my visit note below.      [facility]     PHYSICAL MEDICINE & REHABILITATION CLINIC NOTE  OCCIPITAL NERVE BLOCK        Chief Complaint   Patient presents with     Botox       HPI:  Westley Singh is a 52 year old male with a history of chronic migraine headaches who presents today for bilateral occipital nerve blocks with the goal of minimizing him occipital headaches. He was last seen on 5/26/2022 in consultation for his post-concussion headaches, at which time he was found to be overusing Tylenol. It was recommended at that time that he discontinue Tylenol overuse to use no more than 15 days per month. He was also started on propranolol 80 mg tid as well as rizatriptan as a rescue medication.     He is tolerating the propranolol without an noticeable side effects. He has not yet tried the rizatriptan as a rescue, but has filled the prescription. Upon starting the propranolol, his headaches were better, but he did return to work on a full-time basis in 8/2022, and since then, he has been having daily headaches. His headaches are typically suboccipital and radiate forward to the top of his head. Current headache pain is a 4-5/10, but will increase to a 7-8/10 with stress. Since returning to work, his Tylenol use has increased significantly again. He reports taking 2500 mg tid on a daily basis. In addition to the other medications, he is taking amitriptyline at bedtime.       Current Outpatient Medications   Medication Sig Dispense Refill     albuterol (PROAIR HFA/PROVENTIL HFA/VENTOLIN HFA) 108 (90 Base) MCG/ACT inhaler Inhale 2 puffs into the lungs every 6 hours as needed for shortness of breath / dyspnea 8 g 1     amitriptyline (ELAVIL) 50 MG tablet TAKE  1 TO 2 TABLETS BY MOUTH AT BEDTIME 60 tablet 0     amLODIPine (NORVASC) 5 MG tablet Take 1 tablet (5 mg) by mouth daily 90 tablet 3     buPROPion (WELLBUTRIN XL) 300 MG 24 hr tablet TAKE 1 TABLET BY MOUTH IN THE MORNING 30 tablet 4     eszopiclone (LUNESTA) 1 MG tablet Take 1 tablet (1 mg) by mouth At Bedtime 30 tablet 1     lisdexamfetamine (VYVANSE) 70 MG capsule Take 1 capsule (70 mg) by mouth every morning 30 capsule 0     methylphenidate (RITALIN) 10 MG tablet Take 1 tablet (10 mg) by mouth 3 times daily 90 tablet 0     mirtazapine (REMERON) 7.5 MG tablet TAKE ONE TABLET BY MOUTH AT BEDTIME 30 tablet 4     ondansetron (ZOFRAN ODT) 4 MG ODT tab Take 1 tablet (4 mg) by mouth every 8 hours as needed for nausea or vomiting 10 tablet 0     propranolol (INDERAL) 80 MG tablet TAKE ONE TABLET BY MOUTH THREE TIMES DAILY 90 tablet 0     rizatriptan (MAXALT) 10 MG tablet Take 1 tablet (10 mg) by mouth at onset of headache for migraine May repeat in 2 hours. Max 3 tablets/24 hours. 10 tablet 3     sucralfate (CARAFATE) 1 GM/10ML suspension Take 10 mLs (1 g) by mouth 4 times daily as needed (Heartburn, nausea) 414 mL 0       Allergies   Allergen Reactions     Aspirin      Cephalosporins      Penicillins          PHYSICAL EXAM:  VS: /69 (BP Location: Right arm, Patient Position: Sitting)   Pulse 87    GEN: Patient is pleasant and cooperative  SKIN: No lesions or abrasions on exposed skin      PROCEDURE: Right and Left greater occipital nerve block.     Prior to the start of the procedure and with procedural staff participation, I verbally confirmed the patient s identity using two indicators, relevant allergies, that the procedure was appropriate and matched the consent or emergent situation, and that the correct equipment/implants were available. Immediately prior to starting the procedure I conducted the Time Out with the procedural staff and re-confirmed the patient s name, procedure, and site/side. (The Joint  Commission universal protocol was followed.)  Yes    Sedation (Moderate or Deep): None    Dru% lidocaine: 2 ml (Lot: [id number], Exp: 2026, NDC: 66655-510-64)  0.5% bupivacaine: 7 ml (Lot: [id number], Exp: 2026, NDC: 50522-952-46)  Kenalo mg = 1 ml (Lot: [id number], Exp: 2024, NDC: [address]-5)      Area just inferior to insertion of the right and left superior trapezius insertion onto skull was cleansed with ChloraPrep. Needle was advanced anteriorly to base of skull then slightly withdrawn and injectate was injected in a fan-like distribution at different depths. A 10 ml mixture of 1% lidocaine, 0.5% bupivacaine and Kenalog was divided into two 5 ml syringes. Total injection volume = 5 ml per side.     Westley Singh tolerated the procedure well without any immediate complications. he was allowed to recover for an appropriate period of time and was discharged home in stable condition.  Patient will follow-up regarding response to this procedure.      ASSESSMENT/PLAN:  Westley Singh is a 52 year old male who presents to clinic for bilateral occipital nerve blocks. Patient tolerated the procedure well and will monitor his response to today's injections.    Regarding his medication use, he was again advised to limit his Tylenol use to no more than 4000 mg daily and no more than 15 days per month to prevent irreversible liver damage and medication overuse headaches. A hepatic panel was ordered today to ensure no liver enzyme elevation. Since headaches continue to be daily, it is reasonable to start another prophylactic headache medication. He will continue on propranolol and amitriptyline, and we have added topiramate 25 mg daily, which can be slowly titrated up to 25 mg bid. If he does not tolerate the topiramate, we may consider going up on his other medications or a trial of Botox. He will MyChart regarding his response to the topiramate.     Follow up in clinic for repeat occipital nerve blocks in 3  months or sooner if needed.       Kim Long MD     I spent a total of 30 minutes, face-to-face and managing the care of Westley Singh, excluding the procedure. Over 50% of my time was spent counseling the patient and coordinating care. Please see note for details.           Again, thank you for allowing me to participate in the care of your patient.        Sincerely,        Kim Long MD

## 2022-10-27 NOTE — PROGRESS NOTES
[facility]     PHYSICAL MEDICINE & REHABILITATION CLINIC NOTE  OCCIPITAL NERVE BLOCK        Chief Complaint   Patient presents with     Botox       HPI:  Westley Singh is a 52 year old male with a history of chronic migraine headaches who presents today for bilateral occipital nerve blocks with the goal of minimizing him occipital headaches. He was last seen on 5/26/2022 in consultation for his post-concussion headaches, at which time he was found to be overusing Tylenol. It was recommended at that time that he discontinue Tylenol overuse to use no more than 15 days per month. He was also started on propranolol 80 mg tid as well as rizatriptan as a rescue medication.     He is tolerating the propranolol without an noticeable side effects. He has not yet tried the rizatriptan as a rescue, but has filled the prescription. Upon starting the propranolol, his headaches were better, but he did return to work on a full-time basis in 8/2022, and since then, he has been having daily headaches. His headaches are typically suboccipital and radiate forward to the top of his head. Current headache pain is a 4-5/10, but will increase to a 7-8/10 with stress. Since returning to work, his Tylenol use has increased significantly again. He reports taking 2500 mg tid on a daily basis. In addition to the other medications, he is taking amitriptyline at bedtime.       Current Outpatient Medications   Medication Sig Dispense Refill     albuterol (PROAIR HFA/PROVENTIL HFA/VENTOLIN HFA) 108 (90 Base) MCG/ACT inhaler Inhale 2 puffs into the lungs every 6 hours as needed for shortness of breath / dyspnea 8 g 1     amitriptyline (ELAVIL) 50 MG tablet TAKE 1 TO 2 TABLETS BY MOUTH AT BEDTIME 60 tablet 0     amLODIPine (NORVASC) 5 MG tablet Take 1 tablet (5 mg) by mouth daily 90 tablet 3     buPROPion (WELLBUTRIN XL) 300 MG 24 hr tablet TAKE 1 TABLET BY MOUTH IN THE MORNING 30 tablet 4     eszopiclone  (LUNESTA) 1 MG tablet Take 1 tablet (1 mg) by mouth At Bedtime 30 tablet 1     lisdexamfetamine (VYVANSE) 70 MG capsule Take 1 capsule (70 mg) by mouth every morning 30 capsule 0     methylphenidate (RITALIN) 10 MG tablet Take 1 tablet (10 mg) by mouth 3 times daily 90 tablet 0     mirtazapine (REMERON) 7.5 MG tablet TAKE ONE TABLET BY MOUTH AT BEDTIME 30 tablet 4     ondansetron (ZOFRAN ODT) 4 MG ODT tab Take 1 tablet (4 mg) by mouth every 8 hours as needed for nausea or vomiting 10 tablet 0     propranolol (INDERAL) 80 MG tablet TAKE ONE TABLET BY MOUTH THREE TIMES DAILY 90 tablet 0     rizatriptan (MAXALT) 10 MG tablet Take 1 tablet (10 mg) by mouth at onset of headache for migraine May repeat in 2 hours. Max 3 tablets/24 hours. 10 tablet 3     sucralfate (CARAFATE) 1 GM/10ML suspension Take 10 mLs (1 g) by mouth 4 times daily as needed (Heartburn, nausea) 414 mL 0       Allergies   Allergen Reactions     Aspirin      Cephalosporins      Penicillins          PHYSICAL EXAM:  VS: /69 (BP Location: Right arm, Patient Position: Sitting)   Pulse 87    GEN: Patient is pleasant and cooperative  SKIN: No lesions or abrasions on exposed skin      PROCEDURE: Right and Left greater occipital nerve block.     Prior to the start of the procedure and with procedural staff participation, I verbally confirmed the patient s identity using two indicators, relevant allergies, that the procedure was appropriate and matched the consent or emergent situation, and that the correct equipment/implants were available. Immediately prior to starting the procedure I conducted the Time Out with the procedural staff and re-confirmed the patient s name, procedure, and site/side. (The Joint Commission universal protocol was followed.)  Yes    Sedation (Moderate or Deep): None    Dru% lidocaine: 2 ml (Lot: [id number], Exp: 2026, NDC: 80569-761-08)  0.5% bupivacaine: 7 ml (Lot: [id number], Exp: 2026, NDC: 81626-076-49)  Kenalo mg  = 1 ml (Lot: [id number], Exp: 07/2024, NDC: [address]-6)      Area just inferior to insertion of the right and left superior trapezius insertion onto skull was cleansed with ChloraPrep. Needle was advanced anteriorly to base of skull then slightly withdrawn and injectate was injected in a fan-like distribution at different depths. A 10 ml mixture of 1% lidocaine, 0.5% bupivacaine and Kenalog was divided into two 5 ml syringes. Total injection volume = 5 ml per side.     Westley Singh tolerated the procedure well without any immediate complications. he was allowed to recover for an appropriate period of time and was discharged home in stable condition.  Patient will follow-up regarding response to this procedure.      ASSESSMENT/PLAN:  Westley Singh is a 52 year old male who presents to clinic for bilateral occipital nerve blocks. Patient tolerated the procedure well and will monitor his response to today's injections.    Regarding his medication use, he was again advised to limit his Tylenol use to no more than 4000 mg daily and no more than 15 days per month to prevent irreversible liver damage and medication overuse headaches. A hepatic panel was ordered today to ensure no liver enzyme elevation. Since headaches continue to be daily, it is reasonable to start another prophylactic headache medication. He will continue on propranolol and amitriptyline, and we have added topiramate 25 mg daily, which can be slowly titrated up to 25 mg bid. If he does not tolerate the topiramate, we may consider going up on his other medications or a trial of Botox. He will MyChart regarding his response to the topiramate.     Follow up in clinic for repeat occipital nerve blocks in 3 months or sooner if needed.       Kim Long MD     I spent a total of 30 minutes, face-to-face and managing the care of Westley Singh, excluding the procedure. Over 50% of my time was spent counseling the patient and coordinating care. Please see  note for details.

## 2022-10-27 NOTE — PATIENT INSTRUCTIONS
You received occipital nerve blocks today in clinic. You were also prescribed topiramate, which you should start taking in 1-2 weeks. We first would like to see how the occipital nerve block is helping. If you either do not tolerate the topiramate or it does not help after a few weeks, please send me a MyChart message and we can discuss possibility of trying Botox injections.     Kim Long MD

## 2022-11-01 ENCOUNTER — MYC REFILL (OUTPATIENT)
Dept: NEUROLOGY | Facility: CLINIC | Age: 52
End: 2022-11-01

## 2022-11-01 DIAGNOSIS — F07.81 POST CONCUSSION SYNDROME: ICD-10-CM

## 2022-11-01 DIAGNOSIS — G44.309 POST-CONCUSSION HEADACHE: ICD-10-CM

## 2022-11-01 DIAGNOSIS — R41.840 ATTENTION AND CONCENTRATION DEFICIT: ICD-10-CM

## 2022-11-01 RX ORDER — METHYLPHENIDATE HYDROCHLORIDE 10 MG/1
10 TABLET ORAL 3 TIMES DAILY
Qty: 90 TABLET | Refills: 0 | Status: SHIPPED | OUTPATIENT
Start: 2022-11-01 | End: 2022-12-07

## 2022-11-02 ENCOUNTER — HOSPITAL ENCOUNTER (OUTPATIENT)
Dept: SPEECH THERAPY | Facility: CLINIC | Age: 52
Discharge: HOME | End: 2022-11-02
Payer: COMMERCIAL

## 2022-11-02 PROCEDURE — 97129 THER IVNTJ 1ST 15 MIN: CPT | Mod: GN | Performed by: SPEECH-LANGUAGE PATHOLOGIST

## 2022-11-02 PROCEDURE — 97130 THER IVNTJ EA ADDL 15 MIN: CPT | Mod: GN | Performed by: SPEECH-LANGUAGE PATHOLOGIST

## 2022-11-07 ENCOUNTER — HOSPITAL ENCOUNTER (OUTPATIENT)
Dept: PHYSICAL THERAPY | Facility: CLINIC | Age: 52
Discharge: HOME | End: 2022-11-07
Payer: COMMERCIAL

## 2022-11-07 DIAGNOSIS — R42 DIZZINESS: Primary | ICD-10-CM

## 2022-11-07 DIAGNOSIS — R26.89 BALANCE PROBLEMS: ICD-10-CM

## 2022-11-07 DIAGNOSIS — G44.309 POST-CONCUSSION HEADACHE: ICD-10-CM

## 2022-11-07 DIAGNOSIS — M54.2 NECK PAIN: ICD-10-CM

## 2022-11-07 DIAGNOSIS — F07.81 POST CONCUSSION SYNDROME: Primary | ICD-10-CM

## 2022-11-07 PROCEDURE — 97112 NEUROMUSCULAR REEDUCATION: CPT | Mod: GP | Performed by: PHYSICAL THERAPIST

## 2022-11-07 NOTE — PROGRESS NOTES
[facility]    Outpatient Physical Therapy Progress Note  Patient: Westley Singh  : 1970    Beginning/End Dates of Reporting Period:  2022 to 2022; total of 10 vists    Referring Provider: Chrissy Saenz APRN CNP    Therapy Diagnosis: Dizziness, Neck Pain, Headaches, Balance Problems     Client Self Report: Patient arriving today and states that the injection helped with previous presentation of headache, however, now experience  ice pick/knife/stabbing upper left rear headache.   Patient reports that he is waking with the headache and is taking more Tylenol.  Provided a new medication to address headaches; hasn t started it.  With regards to balance and dizziness notes some improvement.  Reports continued sensitivity to light changes when driving and watching optokinetic videos.    Goals:  Goal Identifier HEP   Goal Description Patient will be independent with a HEP address cervical ROM, cervical and scapular strengthening and balance in order to facilitate gains outside of physical therapy.   Target Date 22   Date Met      Progress (detail required for progress note): 2022: reports inconsistent performance (Eval: has resumed some of the previous PT exercises)     Goal Identifier MET: FGA   Goal Description Patient will have improved tolerance for community based ambulation as demonstrated by scoring 28 or greater on the FGA which indicates improved dynamic balance and decreased risk for falls.   Target Date 10/11/22   Date Met  10/24/22   Progress (detail required for progress note): 2022: 2730; 10/24/2022:  (Eval: 19/30 indicates risk for falls)     Goal Identifier MET: Neck Disability Index   Goal Description Patient will report improved tolerance for driving and functional activities by scoring < 20% on the neck disability index.   Target Date 22   Date Met   11/07/22   Progress (detail required for progress note): 8/29/2022: 12/50, 24%; 10/24/2022: 11/50, 22%; 11/7/2022: 14% (Eval: 15/50, 30%)     Goal Identifier DVA   Goal Description The patient will score within 2 lines of SVA with performance of DVA to demonstrate that his vestibular system is functioning optimally and is able to support gaze stability within a functional range.   Target Date 11/30/22   Date Met      Progress (detail required for progress note): 7/18/2022: 8, 8, 3; dizziness at 2 > 1 Hz; 8/29/2022: 8, 8, 4 with no dizziness at 1 Hz and dizziness and slight headache at 2 Hz; 10/24/2022: 10 static, line 6 2Hz - dizziness and slight increase in headache; 11/7/2022: 10 static, line 7 2Hz - no dizzines, but increase in headache     Patient has met 2 out of 4 goals and is demonstrating progression towards remaining goals.  Headaches continue to be a big limitation with regards to symptoms, otherwise, balance, gaze stabilization, and neck pain has improved.  Patient will continue to benefit from continued skilled physical therapy to address headaches, dizziness, gaze stabilization as well as other post concussive symptoms in order to maximize return to prior level of function.    Plan:  Continue therapy per current plan of care; 2x/month for up to 2 additional months.    Discharge:  No

## 2022-11-14 DIAGNOSIS — G43.719 CHRONIC MIGRAINE WITHOUT AURA, INTRACTABLE, WITHOUT STATUS MIGRAINOSUS: ICD-10-CM

## 2022-11-14 DIAGNOSIS — G47.00 INSOMNIA, UNSPECIFIED TYPE: ICD-10-CM

## 2022-11-14 DIAGNOSIS — F07.81 POST CONCUSSION SYNDROME: ICD-10-CM

## 2022-11-14 RX ORDER — PROPRANOLOL HYDROCHLORIDE 80 MG/1
TABLET ORAL
Qty: 90 TABLET | Refills: 0 | Status: SHIPPED | OUTPATIENT
Start: 2022-11-14 | End: 2022-12-12

## 2022-11-14 RX ORDER — AMITRIPTYLINE HYDROCHLORIDE 50 MG/1
TABLET ORAL
Qty: 60 TABLET | Refills: 0 | Status: SHIPPED | OUTPATIENT
Start: 2022-11-14 | End: 2022-12-12

## 2022-11-14 NOTE — TELEPHONE ENCOUNTER
Refill request for amitriptyline 50 mg and propranolol 80 mg  Last follow-up 9/21/22; next follow-up 11/16/22  Medication T'd for review and signature  MACKENZIE Fish ATC on 11/14/2022 at 9:45 AM      amitriptyline (ELAVIL) 50 MG tablet 60 tablet 0 10/18/2022  No   Sig: TAKE 1 TO 2 TABLETS BY MOUTH AT BEDTIME     propranolol (INDERAL) 80 MG tablet 90 tablet 0 10/18/2022  No   Sig: TAKE ONE TABLET BY MOUTH THREE TIMES DAILY

## 2022-11-15 ENCOUNTER — IMMUNIZATION (OUTPATIENT)
Dept: PEDIATRICS | Facility: CLINIC | Age: 52
End: 2022-11-15
Payer: COMMERCIAL

## 2022-11-15 DIAGNOSIS — Z23 NEED FOR PROPHYLACTIC VACCINATION AND INOCULATION AGAINST INFLUENZA: Primary | ICD-10-CM

## 2022-11-15 PROCEDURE — 99207 PR NO CHARGE NURSE ONLY: CPT

## 2022-11-15 PROCEDURE — 90682 RIV4 VACC RECOMBINANT DNA IM: CPT

## 2022-11-15 PROCEDURE — 90471 IMMUNIZATION ADMIN: CPT

## 2022-11-16 ENCOUNTER — HOSPITAL ENCOUNTER (OUTPATIENT)
Dept: SPEECH THERAPY | Facility: CLINIC | Age: 52
Discharge: HOME | End: 2022-11-16
Payer: COMMERCIAL

## 2022-11-16 ENCOUNTER — OFFICE VISIT (OUTPATIENT)
Dept: NEUROLOGY | Facility: CLINIC | Age: 52
End: 2022-11-16
Payer: COMMERCIAL

## 2022-11-16 VITALS — SYSTOLIC BLOOD PRESSURE: 118 MMHG | HEART RATE: 68 BPM | DIASTOLIC BLOOD PRESSURE: 84 MMHG

## 2022-11-16 DIAGNOSIS — F07.81 POST CONCUSSION SYNDROME: Primary | ICD-10-CM

## 2022-11-16 PROCEDURE — 97129 THER IVNTJ 1ST 15 MIN: CPT | Mod: GN | Performed by: SPEECH-LANGUAGE PATHOLOGIST

## 2022-11-16 PROCEDURE — 99215 OFFICE O/P EST HI 40 MIN: CPT | Performed by: NURSE PRACTITIONER

## 2022-11-16 PROCEDURE — 97130 THER IVNTJ EA ADDL 15 MIN: CPT | Mod: GN | Performed by: SPEECH-LANGUAGE PATHOLOGIST

## 2022-11-16 NOTE — PROGRESS NOTES
[facility]    Outpatient Speech Language Pathology Progress Note  Patient: Westley Singh  : 1970    Beginning/End Dates of Reporting Period:  22 to 10/19/22    Referring Provider: RUI Muhammad-MANJULA    Therapy Diagnosis: Mild to moderate cognitive linguisic impairments secondary to post concussion syndrome    Client Self Report: Pt is a 52 y.o. male who completed an OP SLP evaluation on 22, please see the EMR for further medical information. Pt has completed 8 sessions during this treatment period with a focus on cognitive skills related to his occupational requirements. Pt has RTW full time and is experiencing a significant increase in HA's daily with exhaustion noted at the end of every day.         Outcome Measures (most recent score):        Goals:  Goal Identifier LTG 1-Cognition-Memory   Goal Description Patient will learn, implement and demonstrate use of 3 internal or external memory support strategies to complete a moderate to complex level recall task (e.g. recall for 24 hours, listen to a 10 minute conversation and recall information to answer questions) with 80% accy and minimal increase in concussion symptoms in order to remember plans and meetings throughout his workday.    Target Date 22   Date Met   10/19/22   Progress (detail required for progress note):  Goal met for accuracy, however, Pt continues to report and demonstrate moderate to severe increases in post concussion symptoms with complex level memory tasks.      Goal Identifier LTG 2-Cognition-Logic   Goal Description Pt will complete moderate verbal/written reasoning skills for sequencing/daily planning needs with 80% accuracy and minimal increase in concussion symptoms per Pt report.    Target Date 22   Date Met   10/19/22   Progress (detail required for progress note):  Goal met for accuracy, however, Pt  continues to report and demonstrate moderate to severe increases in post concussion symptoms with complex level memory tasks.      Goal Identifier LTG 3A-Attention   Goal Description Pt will complete a complex level divided attention task with <3 cues or errors noted and a minimal increase in post concussion symptoms in order to listen and take notes while in meetings at work.   Target Date 11/07/22   Date Met      Progress (detail required for progress note):  Did not formally address during this treatment period d/t focus on memory and logic goals. Education provided on strategies to limit multitasking in an effort to reduce increased post concussion symptoms specifically while attending work meetings.      Goal Identifier  LTG 1A-Cognition-Memory NEW GOAL   Goal Description Pt will verbalize and independently impliment trained memory strategies during his work day to support ST and LTM function with a target HA of <6/10.    Target Date  1/16/23   Date Met      Progress (detail required for progress note):       Goal Identifier  LTG 2A-Cognition-Logic NEW GOAL   Goal Description Pt will verbalize and independently impliment trained reasoning strategies during his work day with a target HA of <6/10 to support organization and prioritization of daily tasks.    Target Date  1/16/23   Date Met      Progress (detail required for progress note):       Goal Identifier  LTG3A-Cognition-Attn MODIFIED GOAL   Goal Description  Pt will verbalize and independently implement trained strategies to support and reduce necessity of multitasking for improved attention and efficiency at work with a target HA of <6/10.    Target Date  1/16/23   Date Met      Progress (detail required for progress note):         Plan:  Changes to therapy plan of care: See new/modified goals above. Recommend ongoing skilled intervention to address these goals 1x biweekly for an additional 90 days.     Discharge:  No    Thank you for referring Westley Singh  to outpatient therapy at [facility]. Please call Pooja Baker MA, SLP-CCC at [phone] email [email or web address] with any questions or concerns.      Pooja Baker M.A., SLP-CCC  Speech-Language Pathologist

## 2022-11-16 NOTE — PATIENT INSTRUCTIONS
Support staff set up your 10 week follow up visit with me    Concussion Consult Overview Information Sheet is located under Letters In My Chart    Please message me through My Chart if you do not hear from any of the people above    Work letters can be found under letters in My Chart, if you need anything added or changed in your letter just message me through My Chart    If you need me to fill out FMLA, short term disability, or any paperwork you can upload the paperwork to My Chart, you need to get the paperwork from your HR department.     Message me through My Chart if you have any problems, questions, updates, or concerns    If you have any problems with My Chart please call [phone] and they can help

## 2022-11-16 NOTE — ADDENDUM NOTE
Encounter addended by: Pooja Baker, SLP on: 11/16/2022 8:23 AM   Actions taken: Flowsheet data copied forward, Flowsheet accepted, Clinical Note Signed

## 2022-11-16 NOTE — ADDENDUM NOTE
Encounter addended by: Pooja Baker, SLP on: 11/16/2022 8:59 AM   Actions taken: Flowsheet data copied forward, Flowsheet accepted

## 2022-11-16 NOTE — NURSING NOTE
Chief Complaint   Patient presents with     Concussion     Follow up visit     CONCUSSION SYMPTOMS ASSESSMENT 7/13/2022 7/20/2022 11/16/2022   Headache or Pressure In Head 4 - moderate to severe 4 - moderate to severe 2 - mild to moderate   Upset Stomach or Throwing Up 1 - mild 1 - mild 0 - none   Problems with Balance 3 - moderate 2 - mild to moderate 0 - none   Feeling Dizzy 3 - moderate 3 - moderate 0 - none   Sensitivity to Light 2 - mild to moderate 2 - mild to moderate 1 - mild   Sensitivity to Noise 0 - none 2 - mild to moderate 0 - none   Mood Changes 4 - moderate to severe 3 - moderate 1 - mild   Feeling sluggish, hazy, or foggy 4 - moderate to severe 3 - moderate 2 - mild to moderate   Trouble Concentrating, Lack of Focus 4 - moderate to severe 4 - moderate to severe 3 - moderate   Motion Sickness 0 - none 0 - none 0 - none   Vision Changes 2 - mild to moderate - 0 - none   Memory Problems 3 - moderate - 1 - mild   Feeling Confused 2 - mild to moderate - 0 - none   Neck Pain 0 - none - 0 - none   Trouble Sleeping 0 - none - 0 - none   Total Number of Symptoms 11 - 6   Symptom Severity Score 32 - 10
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
## 2022-11-25 ENCOUNTER — MYC REFILL (OUTPATIENT)
Dept: NEUROLOGY | Facility: CLINIC | Age: 52
End: 2022-11-25

## 2022-11-25 DIAGNOSIS — F07.81 POST CONCUSSION SYNDROME: ICD-10-CM

## 2022-11-25 DIAGNOSIS — R41.840 ATTENTION AND CONCENTRATION DEFICIT: ICD-10-CM

## 2022-11-27 RX ORDER — LISDEXAMFETAMINE DIMESYLATE 70 MG/1
70 CAPSULE ORAL EVERY MORNING
Qty: 30 CAPSULE | Refills: 0 | Status: SHIPPED | OUTPATIENT
Start: 2022-11-27 | End: 2022-12-29
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
## 2022-12-07 ENCOUNTER — MYC REFILL (OUTPATIENT)
Dept: NEUROLOGY | Facility: CLINIC | Age: 52
End: 2022-12-07

## 2022-12-07 DIAGNOSIS — R41.840 ATTENTION AND CONCENTRATION DEFICIT: ICD-10-CM

## 2022-12-07 DIAGNOSIS — G44.309 POST-CONCUSSION HEADACHE: ICD-10-CM

## 2022-12-07 DIAGNOSIS — F07.81 POST CONCUSSION SYNDROME: ICD-10-CM

## 2022-12-08 RX ORDER — METHYLPHENIDATE HYDROCHLORIDE 10 MG/1
10 TABLET ORAL 3 TIMES DAILY
Qty: 90 TABLET | Refills: 0 | Status: SHIPPED | OUTPATIENT
Start: 2022-12-08 | End: 2023-01-20

## 2022-12-11 DIAGNOSIS — G43.719 CHRONIC MIGRAINE WITHOUT AURA, INTRACTABLE, WITHOUT STATUS MIGRAINOSUS: ICD-10-CM

## 2022-12-11 DIAGNOSIS — G47.00 INSOMNIA, UNSPECIFIED TYPE: ICD-10-CM

## 2022-12-11 DIAGNOSIS — F07.81 POST CONCUSSION SYNDROME: ICD-10-CM

## 2022-12-12 RX ORDER — AMITRIPTYLINE HYDROCHLORIDE 50 MG/1
TABLET ORAL
Qty: 60 TABLET | Refills: 0 | Status: SHIPPED | OUTPATIENT
Start: 2022-12-12 | End: 2023-03-02

## 2022-12-12 RX ORDER — PROPRANOLOL HYDROCHLORIDE 80 MG/1
TABLET ORAL
Qty: 90 TABLET | Refills: 0 | Status: SHIPPED | OUTPATIENT
Start: 2022-12-12 | End: 2023-03-02

## 2022-12-12 NOTE — TELEPHONE ENCOUNTER
Refill request for amitriptyline 50 mg and propranolol 80 mg  Last follow-up 11/16/22; next follow-up 1/25/23  Medication T'd for review and signature  MACKENZIE Fish ATC on 12/12/2022 at 9:46 AM    propranolol (INDERAL) 80 MG tablet 90 tablet 0 11/14/2022  No   Sig: TAKE ONE TABLET BY MOUTH THREE TIMES DAILY     amitriptyline (ELAVIL) 50 MG tablet 60 tablet 0 11/14/2022  No   Sig: TAKE 1 TO 2 TABLETS BY MOUTH AT BEDTIME

## 2022-12-25 DIAGNOSIS — G43.719 CHRONIC MIGRAINE WITHOUT AURA, INTRACTABLE, WITHOUT STATUS MIGRAINOSUS: ICD-10-CM

## 2022-12-27 ENCOUNTER — HOSPITAL ENCOUNTER (OUTPATIENT)
Dept: SPEECH THERAPY | Facility: CLINIC | Age: 52
Discharge: HOME | End: 2022-12-27
Payer: COMMERCIAL

## 2022-12-27 DIAGNOSIS — G47.00 INSOMNIA, UNSPECIFIED TYPE: ICD-10-CM

## 2022-12-27 PROCEDURE — 97130 THER IVNTJ EA ADDL 15 MIN: CPT | Mod: GN | Performed by: SPEECH-LANGUAGE PATHOLOGIST

## 2022-12-27 PROCEDURE — 97129 THER IVNTJ 1ST 15 MIN: CPT | Mod: GN | Performed by: SPEECH-LANGUAGE PATHOLOGIST

## 2022-12-27 RX ORDER — ESZOPICLONE 1 MG/1
TABLET, FILM COATED ORAL
Qty: 30 TABLET | Refills: 0 | Status: SHIPPED | OUTPATIENT
Start: 2022-12-27 | End: 2023-04-20

## 2022-12-27 NOTE — TELEPHONE ENCOUNTER
Medication refill request for eszopiclone (LUNESTA) 1 MG tablet.     Last Written Prescription Date:  9/21/2022  Last Fill Quantity: 30,  # refills: 1  Last office visit provider:  11/16/2022    Next appointment scheduled:     1/25/2023 10:00 AM (Arrive by 9:45 AM) Chrissy Saenz APRN [facility]       Medication T'd for review and signature    YANA Coombs on 12/27/2022 at 2:26 PM

## 2022-12-28 RX ORDER — TOPIRAMATE 25 MG/1
TABLET, FILM COATED ORAL
Qty: 30 TABLET | Refills: 0 | OUTPATIENT
Start: 2022-12-28

## 2022-12-29 ENCOUNTER — MYC REFILL (OUTPATIENT)
Dept: NEUROLOGY | Facility: CLINIC | Age: 52
End: 2022-12-29

## 2022-12-29 DIAGNOSIS — F07.81 POST CONCUSSION SYNDROME: ICD-10-CM

## 2022-12-29 DIAGNOSIS — R41.840 ATTENTION AND CONCENTRATION DEFICIT: ICD-10-CM

## 2022-12-29 RX ORDER — LISDEXAMFETAMINE DIMESYLATE 70 MG/1
70 CAPSULE ORAL EVERY MORNING
Qty: 30 CAPSULE | Refills: 0 | Status: SHIPPED | OUTPATIENT
Start: 2022-12-29 | End: 2023-01-25

## 2023-01-11 DIAGNOSIS — G43.719 CHRONIC MIGRAINE WITHOUT AURA, INTRACTABLE, WITHOUT STATUS MIGRAINOSUS: ICD-10-CM

## 2023-01-12 NOTE — TELEPHONE ENCOUNTER
"Routing refill request to provider for review/approval because:  Needs review    Last Written Prescription Date:  10/27/22  Last Fill Quantity: 30,  # refills: 1   Last office visit provider:  4/27/22     Requested Prescriptions   Pending Prescriptions Disp Refills     topiramate (TOPAMAX) 25 MG tablet [Pharmacy Med Name: Topiramate Oral Tablet 25 MG] 30 tablet 0     Sig: TAKE ONE TABLET BY MOUTH ONE TIME DAILY       Anti-Seizure Meds Protocol  Failed - 1/11/2023 11:47 AM        Failed - Review Authorizing provider's last note.      Refer to last progress notes: confirm request is for original authorizing provider (cannot be through other providers).          Failed - Normal CBC on file in past 26 months     Recent Labs   Lab Test 05/14/22  1129   WBC 5.3   RBC 4.10*   HGB 13.7   HCT 40.9                    Passed - Recent (12 mo) or future (30 days) visit within the authorizing provider's specialty     Patient has had an office visit with the authorizing provider or a provider within the authorizing providers department within the previous 12 mos or has a future within next 30 days. See \"Patient Info\" tab in inbasket, or \"Choose Columns\" in Meds & Orders section of the refill encounter.              Passed - Normal ALT or AST on file in past 26 months     Recent Labs   Lab Test 10/27/22  1003   ALT 22     Recent Labs   Lab Test 10/27/22  1003   AST 19             Passed - Normal platelet count on file in past 26 months     Recent Labs   Lab Test 05/14/22  1129                  Passed - Medication is active on med list             Beverly Herrera RN 01/12/23 12:19 PM  "

## 2023-01-13 NOTE — TELEPHONE ENCOUNTER
I did not prescribe this for him.  Looks like it was prescribed by Dr. Long--see note on 10/27/22.    Abby Fuentes CNP

## 2023-01-16 RX ORDER — TOPIRAMATE 25 MG/1
TABLET, FILM COATED ORAL
Qty: 30 TABLET | Refills: 0 | OUTPATIENT
Start: 2023-01-16

## 2023-01-20 ENCOUNTER — MYC REFILL (OUTPATIENT)
Dept: NEUROLOGY | Facility: CLINIC | Age: 53
End: 2023-01-20
Payer: COMMERCIAL

## 2023-01-20 DIAGNOSIS — R41.840 ATTENTION AND CONCENTRATION DEFICIT: ICD-10-CM

## 2023-01-20 DIAGNOSIS — G43.719 CHRONIC MIGRAINE WITHOUT AURA, INTRACTABLE, WITHOUT STATUS MIGRAINOSUS: ICD-10-CM

## 2023-01-20 DIAGNOSIS — F07.81 POST CONCUSSION SYNDROME: ICD-10-CM

## 2023-01-20 DIAGNOSIS — G44.309 POST-CONCUSSION HEADACHE: ICD-10-CM

## 2023-01-20 RX ORDER — METHYLPHENIDATE HYDROCHLORIDE 10 MG/1
10 TABLET ORAL 3 TIMES DAILY
Qty: 90 TABLET | Refills: 0 | Status: SHIPPED | OUTPATIENT
Start: 2023-01-20 | End: 2023-03-02

## 2023-01-21 NOTE — TELEPHONE ENCOUNTER
topiramate (TOPAMAX) 25 MG tablet 30 tablet 1 10/27/2022         Last Written Prescription Date:  10-  Last Fill Quantity: 30,   # refills: 1  Last Office Visit : 10-  Future Office visit:  1-    Routing refill request to provider for review/approval because:  Not on protocol.      Kathleen M Doege RN

## 2023-01-23 RX ORDER — TOPIRAMATE 25 MG/1
TABLET, FILM COATED ORAL
Qty: 30 TABLET | Refills: 0 | Status: SHIPPED | OUTPATIENT
Start: 2023-01-23 | End: 2023-03-08 | Stop reason: DRUGHIGH

## 2023-01-24 ENCOUNTER — OFFICE VISIT (OUTPATIENT)
Dept: PHYSICAL MEDICINE AND REHAB | Facility: CLINIC | Age: 53
End: 2023-01-24
Payer: COMMERCIAL

## 2023-01-24 VITALS — HEART RATE: 69 BPM | DIASTOLIC BLOOD PRESSURE: 80 MMHG | SYSTOLIC BLOOD PRESSURE: 113 MMHG

## 2023-01-24 DIAGNOSIS — M54.81 BILATERAL OCCIPITAL NEURALGIA: ICD-10-CM

## 2023-01-24 DIAGNOSIS — G43.719 CHRONIC MIGRAINE WITHOUT AURA, INTRACTABLE, WITHOUT STATUS MIGRAINOSUS: Primary | ICD-10-CM

## 2023-01-24 PROCEDURE — 64405 NJX AA&/STRD GR OCPL NRV: CPT | Mod: 50 | Performed by: PHYSICAL MEDICINE & REHABILITATION

## 2023-01-24 RX ORDER — MOMETASONE FUROATE MONOHYDRATE 50 UG/1
SPRAY, METERED NASAL
COMMUNITY
Start: 2022-06-01

## 2023-01-24 RX ORDER — BUPIVACAINE HYDROCHLORIDE 5 MG/ML
7 INJECTION, SOLUTION PERINEURAL ONCE
Status: COMPLETED | OUTPATIENT
Start: 2023-01-24 | End: 2023-01-24

## 2023-01-24 RX ORDER — TRIAMCINOLONE ACETONIDE 40 MG/ML
40 INJECTION, SUSPENSION INTRA-ARTICULAR; INTRAMUSCULAR ONCE
Status: COMPLETED | OUTPATIENT
Start: 2023-01-24 | End: 2023-01-24

## 2023-01-24 RX ORDER — TOPIRAMATE 50 MG/1
50 TABLET, FILM COATED ORAL DAILY
Qty: 60 TABLET | Refills: 3 | Status: SHIPPED | OUTPATIENT
Start: 2023-01-24 | End: 2023-05-18

## 2023-01-24 RX ORDER — TOPIRAMATE 25 MG/1
50 TABLET, FILM COATED ORAL 2 TIMES DAILY
Qty: 60 TABLET | Refills: 3 | Status: CANCELLED | OUTPATIENT
Start: 2023-01-24

## 2023-01-24 RX ADMIN — BUPIVACAINE HYDROCHLORIDE 35 MG: 5 INJECTION, SOLUTION PERINEURAL at 13:37

## 2023-01-24 RX ADMIN — TRIAMCINOLONE ACETONIDE 40 MG: 40 INJECTION, SUSPENSION INTRA-ARTICULAR; INTRAMUSCULAR at 13:37

## 2023-01-24 NOTE — LETTER
1/24/2023         RE: Westley Singh  [id number] [address] [id number]        Dear Colleague,    Thank you for referring your patient, Westley Singh, to the [facility]. Please see a copy of my visit note below.      [facility]     PHYSICAL MEDICINE & REHABILITATION CLINIC NOTE  OCCIPITAL NERVE BLOCK      Chief Complaint   Patient presents with     Procedure     ONB       HPI:  Westley Singh is a 52 year old male with a history of chronic post-traumatic migraine headaches who presents today for bilateral occipital nerve blocks with the goal of minimizing him occipital headaches. He was last seen on 10/27/2022 for occipital nerve blocks, at which time he was also advised to limit his use of Tylenol to no more than 4000 mg every other day. Additionally, he was started on    Pain at the base of the skull went from pain to pressure. Pain shifted to left side has been having migraine headaches 2 times weekly but still having some form of a headache. Taking Tylenol twice a day, 5000 mg daily. Tried Maxalt which seemed to bring headache down to a tolerable level - took a total of 4 Maxalt.     Current Outpatient Medications   Medication Sig Dispense Refill     albuterol (PROAIR HFA/PROVENTIL HFA/VENTOLIN HFA) 108 (90 Base) MCG/ACT inhaler Inhale 2 puffs into the lungs every 6 hours as needed for shortness of breath / dyspnea 8 g 1     amitriptyline (ELAVIL) 50 MG tablet TAKE 1 TO 2 TABLETS BY MOUTH AT BEDTIME 60 tablet 0     amLODIPine (NORVASC) 5 MG tablet Take 1 tablet (5 mg) by mouth daily 90 tablet 3     buPROPion (WELLBUTRIN XL) 300 MG 24 hr tablet TAKE 1 TABLET BY MOUTH IN THE MORNING 30 tablet 4     eszopiclone (LUNESTA) 1 MG tablet TAKE 1 TABLET BY MOUTH AT BEDTIME 30 tablet 0     lisdexamfetamine (VYVANSE) 70 MG capsule Take 1 capsule (70 mg) by mouth every morning 30 capsule 0     methylphenidate (RITALIN) 10 MG tablet Take 1 tablet (10  mg) by mouth 3 times daily 90 tablet 0     mirtazapine (REMERON) 7.5 MG tablet TAKE ONE TABLET BY MOUTH AT BEDTIME 30 tablet 4     mometasone (NASONEX) 50 MCG/ACT nasal spray        ondansetron (ZOFRAN ODT) 4 MG ODT tab Take 1 tablet (4 mg) by mouth every 8 hours as needed for nausea or vomiting 10 tablet 0     propranolol (INDERAL) 80 MG tablet TAKE ONE TABLET BY MOUTH THREE TIMES DAILY 90 tablet 0     rizatriptan (MAXALT) 10 MG tablet Take 1 tablet (10 mg) by mouth at onset of headache for migraine May repeat in 2 hours. Max 3 tablets/24 hours. 10 tablet 3     sucralfate (CARAFATE) 1 GM/10ML suspension Take 10 mLs (1 g) by mouth 4 times daily as needed (Heartburn, nausea) 414 mL 0     topiramate (TOPAMAX) 25 MG tablet TAKE ONE TABLET BY MOUTH ONE TIME DAILY 30 tablet 0       Allergies   Allergen Reactions     Aspirin      Cephalosporins      Penicillins          PHYSICAL EXAM:  VS: /80 (BP Location: Right arm, Patient Position: Sitting, Cuff Size: Adult Regular)   Pulse 69    GEN: Patient is pleasant and cooperative  SKIN: No lesions or abrasions on exposed skin      PROCEDURE: Right and Left greater occipital nerve block.     Prior to the start of the procedure and with procedural staff participation, I verbally confirmed the patient s identity using two indicators, relevant allergies, that the procedure was appropriate and matched the consent or emergent situation, and that the correct equipment/implants were available. Immediately prior to starting the procedure I conducted the Time Out with the procedural staff and re-confirmed the patient s name, procedure, and site/side. (The Joint Commission universal protocol was followed.)  Yes    Sedation (Moderate or Deep): None    Dru% lidocaine: 2 ml   0.5% bupivacaine: 7 ml   Kenalo mg = 1 ml       Area just inferior to insertion of the right and left superior trapezius insertion onto skull was cleansed with ChloraPrep. Needle was advanced  anteriorly to base of skull then slightly withdrawn and injectate was injected in a fan-like distribution at different depths. A 10 ml mixture of 1% lidocaine, 0.5% bupivacaine and Kenalog was divided into two 5 ml syringes. Total injection volume = 4 ml on the right side and 6 ml on the left side.     Westley Singh tolerated the procedure well without any immediate complications. he was allowed to recover for an appropriate period of time and was discharged home in stable condition.  Patient will follow-up regarding response to this procedure.      ASSESSMENT/PLAN:  Westley Singh is a 52 year old male who presents to clinic for bilateral occipital nerve blocks. Patient tolerated the procedure well, and noted a reduction in pain following the procedure.     Increased topiramate from 25 mg daily to 50 mg daily. Advised to decrease Tylenol use to no more than 4000 mg daily, every other day to prevent liver damage and medication overuse headaches. If he can limit his Tylenol use to every other day, may be a candidate for Botox.     Follow up in clinic in 3 months or sooner if needed.       Kim Long MD         Again, thank you for allowing me to participate in the care of your patient.        Sincerely,        Kim Long MD

## 2023-01-24 NOTE — PROGRESS NOTES
[facility]     PHYSICAL MEDICINE & REHABILITATION CLINIC NOTE  OCCIPITAL NERVE BLOCK      Chief Complaint   Patient presents with     Procedure     ONB       HPI:  Westley Singh is a 52 year old male with a history of chronic post-traumatic migraine headaches who presents today for bilateral occipital nerve blocks with the goal of minimizing him occipital headaches. He was last seen on 10/27/2022 for occipital nerve blocks, at which time he was also advised to limit his use of Tylenol to no more than 4000 mg every other day. Additionally, he was started on    Pain at the base of the skull went from pain to pressure. Pain shifted to left side has been having migraine headaches 2 times weekly but still having some form of a headache. Taking Tylenol twice a day, 5000 mg daily. Tried Maxalt which seemed to bring headache down to a tolerable level - took a total of 4 Maxalt.     Current Outpatient Medications   Medication Sig Dispense Refill     albuterol (PROAIR HFA/PROVENTIL HFA/VENTOLIN HFA) 108 (90 Base) MCG/ACT inhaler Inhale 2 puffs into the lungs every 6 hours as needed for shortness of breath / dyspnea 8 g 1     amitriptyline (ELAVIL) 50 MG tablet TAKE 1 TO 2 TABLETS BY MOUTH AT BEDTIME 60 tablet 0     amLODIPine (NORVASC) 5 MG tablet Take 1 tablet (5 mg) by mouth daily 90 tablet 3     buPROPion (WELLBUTRIN XL) 300 MG 24 hr tablet TAKE 1 TABLET BY MOUTH IN THE MORNING 30 tablet 4     eszopiclone (LUNESTA) 1 MG tablet TAKE 1 TABLET BY MOUTH AT BEDTIME 30 tablet 0     lisdexamfetamine (VYVANSE) 70 MG capsule Take 1 capsule (70 mg) by mouth every morning 30 capsule 0     methylphenidate (RITALIN) 10 MG tablet Take 1 tablet (10 mg) by mouth 3 times daily 90 tablet 0     mirtazapine (REMERON) 7.5 MG tablet TAKE ONE TABLET BY MOUTH AT BEDTIME 30 tablet 4     mometasone (NASONEX) 50 MCG/ACT nasal spray        ondansetron (ZOFRAN ODT) 4 MG ODT tab Take 1 tablet (4 mg) by mouth every  8 hours as needed for nausea or vomiting 10 tablet 0     propranolol (INDERAL) 80 MG tablet TAKE ONE TABLET BY MOUTH THREE TIMES DAILY 90 tablet 0     rizatriptan (MAXALT) 10 MG tablet Take 1 tablet (10 mg) by mouth at onset of headache for migraine May repeat in 2 hours. Max 3 tablets/24 hours. 10 tablet 3     sucralfate (CARAFATE) 1 GM/10ML suspension Take 10 mLs (1 g) by mouth 4 times daily as needed (Heartburn, nausea) 414 mL 0     topiramate (TOPAMAX) 25 MG tablet TAKE ONE TABLET BY MOUTH ONE TIME DAILY 30 tablet 0       Allergies   Allergen Reactions     Aspirin      Cephalosporins      Penicillins          PHYSICAL EXAM:  VS: /80 (BP Location: Right arm, Patient Position: Sitting, Cuff Size: Adult Regular)   Pulse 69    GEN: Patient is pleasant and cooperative  SKIN: No lesions or abrasions on exposed skin      PROCEDURE: Right and Left greater occipital nerve block.     Prior to the start of the procedure and with procedural staff participation, I verbally confirmed the patient s identity using two indicators, relevant allergies, that the procedure was appropriate and matched the consent or emergent situation, and that the correct equipment/implants were available. Immediately prior to starting the procedure I conducted the Time Out with the procedural staff and re-confirmed the patient s name, procedure, and site/side. (The Joint Commission universal protocol was followed.)  Yes    Sedation (Moderate or Deep): None    Dru% lidocaine: 2 ml   0.5% bupivacaine: 7 ml   Kenalo mg = 1 ml       Area just inferior to insertion of the right and left superior trapezius insertion onto skull was cleansed with ChloraPrep. Needle was advanced anteriorly to base of skull then slightly withdrawn and injectate was injected in a fan-like distribution at different depths. A 10 ml mixture of 1% lidocaine, 0.5% bupivacaine and Kenalog was divided into two 5 ml syringes. Total injection volume = 4 ml on the  right side and 6 ml on the left side.     Westley Singh tolerated the procedure well without any immediate complications. he was allowed to recover for an appropriate period of time and was discharged home in stable condition.  Patient will follow-up regarding response to this procedure.      ASSESSMENT/PLAN:  Westley Singh is a 52 year old male who presents to clinic for bilateral occipital nerve blocks. Patient tolerated the procedure well, and noted a reduction in pain following the procedure.     Increased topiramate from 25 mg daily to 50 mg daily. Advised to decrease Tylenol use to no more than 4000 mg daily, every other day to prevent liver damage and medication overuse headaches. If he can limit his Tylenol use to every other day, may be a candidate for Botox.     Follow up in clinic in 3 months or sooner if needed.       Kim Long MD

## 2023-01-25 ENCOUNTER — VIRTUAL VISIT (OUTPATIENT)
Dept: NEUROLOGY | Facility: CLINIC | Age: 53
End: 2023-01-25
Payer: COMMERCIAL

## 2023-01-25 ENCOUNTER — HOSPITAL ENCOUNTER (OUTPATIENT)
Dept: SPEECH THERAPY | Facility: CLINIC | Age: 53
Discharge: HOME | End: 2023-01-25
Payer: COMMERCIAL

## 2023-01-25 DIAGNOSIS — R41.840 ATTENTION AND CONCENTRATION DEFICIT: ICD-10-CM

## 2023-01-25 DIAGNOSIS — F07.81 POST CONCUSSION SYNDROME: Primary | ICD-10-CM

## 2023-01-25 PROCEDURE — 99215 OFFICE O/P EST HI 40 MIN: CPT | Mod: GT | Performed by: NURSE PRACTITIONER

## 2023-01-25 PROCEDURE — 97130 THER IVNTJ EA ADDL 15 MIN: CPT | Mod: GN | Performed by: SPEECH-LANGUAGE PATHOLOGIST

## 2023-01-25 PROCEDURE — 97129 THER IVNTJ 1ST 15 MIN: CPT | Mod: GN | Performed by: SPEECH-LANGUAGE PATHOLOGIST

## 2023-01-25 RX ORDER — LISDEXAMFETAMINE DIMESYLATE 70 MG/1
70 CAPSULE ORAL EVERY MORNING
Qty: 30 CAPSULE | Refills: 0 | Status: SHIPPED | OUTPATIENT
Start: 2023-01-25 | End: 2023-03-16
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
## 2023-01-25 NOTE — NURSING NOTE
CONCUSSION SYMPTOMS ASSESSMENT 7/20/2022 11/16/2022 1/25/2023   Headache or Pressure In Head 4 - moderate to severe 2 - mild to moderate 3 - moderate   Upset Stomach or Throwing Up 1 - mild 0 - none 0 - none   Problems with Balance 2 - mild to moderate 0 - none 0 - none   Feeling Dizzy 3 - moderate 0 - none 1 - mild   Sensitivity to Light 2 - mild to moderate 1 - mild 1 - mild   Sensitivity to Noise 2 - mild to moderate 0 - none 1 - mild   Mood Changes 3 - moderate 1 - mild 1 - mild   Feeling sluggish, hazy, or foggy 3 - moderate 2 - mild to moderate 3 - moderate   Trouble Concentrating, Lack of Focus 4 - moderate to severe 3 - moderate 3 - moderate   Motion Sickness 0 - none 0 - none 0 - none   Vision Changes - 0 - none 1 - mild   Memory Problems - 1 - mild 1 - mild   Feeling Confused - 0 - none 1 - mild   Neck Pain - 0 - none 0 - none   Trouble Sleeping - 0 - none 1 - mild   Total Number of Symptoms - 6 11   Symptom Severity Score - 10 17

## 2023-01-25 NOTE — PROGRESS NOTES
[facility]    Outpatient Speech Language Pathology Progress Note  Patient: Westley Singh  : 1970    Beginning/End Dates of Reporting Period:  10/19/22 to 23    Referring Provider: RUI Muhammad-MANJULA    Therapy Diagnosis: Mild to moderate cognitive linguisic impairments secondary to post concussion syndrome    Client Self Report:   Pt is a 52 y.o. male who completed an OP SLP evaluation on 22, please see the EMR for further information. Pt has completed 3 sessions during this treatment period. He has received x2 occipital blocks which he reports have varied in efficacy, he did note a reduction of pain and is able to take less OTC pain relievers. Pt continues to work full time and is experiencing a great deal of work stress, he is concerned about maintaining his employment, and is actively seeking a new position at a different company. Pt is working with a mental health provider 1x/month, which he reports is somewhat helpful. Pt expressing frustration with impaired ability to multitask and be as efficient as his baseline skill set in a professional setting. Pt endorses that his frustration with needing to utilize strategies and modify his workload.         Outcome Measures (most recent score):        Goals:  Goal Identifier LTG 1A-Cognition-Memory   Goal Description Pt will verbalize and independently impliment trained memory strategies during his work day to support ST and LTM function with a target HA of <6/10.   Target Date 23-extend goal 23   Date Met      Progress (detail required for progress note):  Goal not met, Pt is independently utilizing trained internal and external memory strategies in an occupational setting but continues to experience significant HA's/migraines resulting in the need to take over the recommended amt. Of Tylenol. Continue goal with a focus on acceptance of  goal implimentation to reduce internal frustration and reduce stress which confounds post concussion symptoms.      Goal Identifier LTG 2A-Cognition-Logic   Goal Description Pt will verbalize and independently impliment trained reasoning strategies during his work day with a target HA of <6/10 to support organization and prioritization of daily tasks.   Target Date 1/16/23-Extend goal 04/14/23   Date Met      Progress (detail required for progress note):  Pt reports use of note taking to organize meeting information, and is re-formatting less. Will work to incorporate bullet points to improve organization moving forward. Pt ongoing with HA >6/10 w/o medication. Continue goal.      Goal Identifier LTG 3A-Attention   Goal Description Pt will verbalize and independently implement trained strategies to support and reduce necessity of multitasking for improved attention and efficiency at work with a target HA of <6/10.   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 Pt working to ID when he is having difficulty with multitasking and then utilize a prioritization strategy. Pt ongoing with HA >6/10, continue goal.          Plan:  Continue therapy per current plan of care. Pt will continue to benefit from skilled intervention at a reduced frequency of 2x/3 months to address the goals outlined above.     Discharge:  No    Thank you for referring Westley Singh to outpatient therapy at [facility]. Please call Pooja Baker MA, SLP-CCC at [phone] email [email or web address] with any questions or concerns.      Pooja Baker M.A., SLP-CCC  Speech-Language Pathologist
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
## 2023-03-02 ENCOUNTER — MYC REFILL (OUTPATIENT)
Dept: NEUROLOGY | Facility: CLINIC | Age: 53
End: 2023-03-02
Payer: COMMERCIAL

## 2023-03-02 DIAGNOSIS — F07.81 POST CONCUSSION SYNDROME: ICD-10-CM

## 2023-03-02 DIAGNOSIS — G44.309 POST-CONCUSSION HEADACHE: ICD-10-CM

## 2023-03-02 DIAGNOSIS — G43.719 CHRONIC MIGRAINE WITHOUT AURA, INTRACTABLE, WITHOUT STATUS MIGRAINOSUS: ICD-10-CM

## 2023-03-02 DIAGNOSIS — G47.00 INSOMNIA, UNSPECIFIED TYPE: ICD-10-CM

## 2023-03-02 DIAGNOSIS — R41.840 ATTENTION AND CONCENTRATION DEFICIT: ICD-10-CM

## 2023-03-04 DIAGNOSIS — G43.719 CHRONIC MIGRAINE WITHOUT AURA, INTRACTABLE, WITHOUT STATUS MIGRAINOSUS: ICD-10-CM

## 2023-03-06 RX ORDER — PROPRANOLOL HYDROCHLORIDE 80 MG/1
80 TABLET ORAL 3 TIMES DAILY
Qty: 90 TABLET | Refills: 0 | Status: SHIPPED | OUTPATIENT
Start: 2023-03-06 | End: 2023-04-06

## 2023-03-06 RX ORDER — AMITRIPTYLINE HYDROCHLORIDE 50 MG/1
50-100 TABLET ORAL AT BEDTIME
Qty: 60 TABLET | Refills: 0 | Status: SHIPPED | OUTPATIENT
Start: 2023-03-06 | End: 2023-04-06

## 2023-03-06 RX ORDER — METHYLPHENIDATE HYDROCHLORIDE 10 MG/1
10 TABLET ORAL 3 TIMES DAILY
Qty: 90 TABLET | Refills: 0 | Status: SHIPPED | OUTPATIENT
Start: 2023-03-06 | End: 2023-04-06

## 2023-03-08 ENCOUNTER — OFFICE VISIT (OUTPATIENT)
Dept: FAMILY MEDICINE | Facility: CLINIC | Age: 53
End: 2023-03-08
Payer: COMMERCIAL

## 2023-03-08 VITALS
TEMPERATURE: 98.8 F | DIASTOLIC BLOOD PRESSURE: 78 MMHG | HEIGHT: 72 IN | WEIGHT: 209 LBS | RESPIRATION RATE: 16 BRPM | OXYGEN SATURATION: 99 % | BODY MASS INDEX: 28.31 KG/M2 | SYSTOLIC BLOOD PRESSURE: 112 MMHG | HEART RATE: 73 BPM

## 2023-03-08 DIAGNOSIS — I10 BENIGN ESSENTIAL HYPERTENSION: ICD-10-CM

## 2023-03-08 DIAGNOSIS — Z12.11 SCREEN FOR COLON CANCER: ICD-10-CM

## 2023-03-08 DIAGNOSIS — S06.9XAS MOOD DISORDER AS LATE EFFECT OF TRAUMATIC BRAIN INJURY (H): ICD-10-CM

## 2023-03-08 DIAGNOSIS — Z00.00 ROUTINE GENERAL MEDICAL EXAMINATION AT A HEALTH CARE FACILITY: Primary | ICD-10-CM

## 2023-03-08 DIAGNOSIS — N52.9 ERECTILE DYSFUNCTION, UNSPECIFIED ERECTILE DYSFUNCTION TYPE: ICD-10-CM

## 2023-03-08 DIAGNOSIS — F33.1 MAJOR DEPRESSIVE DISORDER, RECURRENT EPISODE, MODERATE (H): ICD-10-CM

## 2023-03-08 DIAGNOSIS — F06.30 MOOD DISORDER AS LATE EFFECT OF TRAUMATIC BRAIN INJURY (H): ICD-10-CM

## 2023-03-08 LAB
ALBUMIN SERPL BCG-MCNC: 4.3 G/DL (ref 3.5–5.2)
ALP SERPL-CCNC: 88 U/L (ref 40–129)
ALT SERPL W P-5'-P-CCNC: 44 U/L (ref 10–50)
ANION GAP SERPL CALCULATED.3IONS-SCNC: 10 MMOL/L (ref 7–15)
AST SERPL W P-5'-P-CCNC: 35 U/L (ref 10–50)
BILIRUB SERPL-MCNC: 0.4 MG/DL
BUN SERPL-MCNC: 17.3 MG/DL (ref 6–20)
CALCIUM SERPL-MCNC: 9.8 MG/DL (ref 8.6–10)
CHLORIDE SERPL-SCNC: 102 MMOL/L (ref 98–107)
CREAT SERPL-MCNC: 0.9 MG/DL (ref 0.67–1.17)
DEPRECATED HCO3 PLAS-SCNC: 26 MMOL/L (ref 22–29)
GFR SERPL CREATININE-BSD FRML MDRD: >90 ML/MIN/1.73M2
GLUCOSE SERPL-MCNC: 111 MG/DL (ref 70–99)
POTASSIUM SERPL-SCNC: 4.6 MMOL/L (ref 3.4–5.3)
PROT SERPL-MCNC: 7.6 G/DL (ref 6.4–8.3)
SODIUM SERPL-SCNC: 138 MMOL/L (ref 136–145)

## 2023-03-08 PROCEDURE — 36415 COLL VENOUS BLD VENIPUNCTURE: CPT | Performed by: NURSE PRACTITIONER

## 2023-03-08 PROCEDURE — 99213 OFFICE O/P EST LOW 20 MIN: CPT | Mod: 25 | Performed by: NURSE PRACTITIONER

## 2023-03-08 PROCEDURE — 91312 COVID-19 VACCINE BIVALENT BOOSTER 12+ (PFIZER): CPT | Performed by: NURSE PRACTITIONER

## 2023-03-08 PROCEDURE — 99396 PREV VISIT EST AGE 40-64: CPT | Mod: 25 | Performed by: NURSE PRACTITIONER

## 2023-03-08 PROCEDURE — 80053 COMPREHEN METABOLIC PANEL: CPT | Performed by: NURSE PRACTITIONER

## 2023-03-08 PROCEDURE — 0124A COVID-19 VACCINE BIVALENT BOOSTER 12+ (PFIZER): CPT | Performed by: NURSE PRACTITIONER

## 2023-03-08 RX ORDER — AMLODIPINE BESYLATE 5 MG/1
5 TABLET ORAL DAILY
Qty: 90 TABLET | Refills: 3 | Status: SHIPPED | OUTPATIENT
Start: 2023-03-08 | End: 2024-02-09

## 2023-03-08 RX ORDER — TADALAFIL 10 MG/1
10 TABLET ORAL DAILY PRN
Qty: 12 TABLET | Refills: 11 | Status: SHIPPED | OUTPATIENT
Start: 2023-03-08

## 2023-03-08 ASSESSMENT — ASTHMA QUESTIONNAIRES
ACT_TOTALSCORE: 22
QUESTION_1 LAST FOUR WEEKS HOW MUCH OF THE TIME DID YOUR ASTHMA KEEP YOU FROM GETTING AS MUCH DONE AT WORK, SCHOOL OR AT HOME: NONE OF THE TIME
QUESTION_2 LAST FOUR WEEKS HOW OFTEN HAVE YOU HAD SHORTNESS OF BREATH: ONCE OR TWICE A WEEK
QUESTION_3 LAST FOUR WEEKS HOW OFTEN DID YOUR ASTHMA SYMPTOMS (WHEEZING, COUGHING, SHORTNESS OF BREATH, CHEST TIGHTNESS OR PAIN) WAKE YOU UP AT NIGHT OR EARLIER THAN USUAL IN THE MORNING: ONCE OR TWICE
QUESTION_5 LAST FOUR WEEKS HOW WOULD YOU RATE YOUR ASTHMA CONTROL: COMPLETELY CONTROLLED
ACT_TOTALSCORE: 22
QUESTION_4 LAST FOUR WEEKS HOW OFTEN HAVE YOU USED YOUR RESCUE INHALER OR NEBULIZER MEDICATION (SUCH AS ALBUTEROL): ONCE A WEEK OR LESS

## 2023-03-08 ASSESSMENT — ENCOUNTER SYMPTOMS
HEARTBURN: 0
SHORTNESS OF BREATH: 0
DIZZINESS: 0
ABDOMINAL PAIN: 0
CONSTIPATION: 0
FREQUENCY: 0
COUGH: 1
PARESTHESIAS: 0
WEAKNESS: 0
HEMATOCHEZIA: 0
SORE THROAT: 0
ARTHRALGIAS: 0
MYALGIAS: 0
EYE PAIN: 0
HEMATURIA: 0
HEADACHES: 1
CHILLS: 0
NAUSEA: 0
DIARRHEA: 0
DYSURIA: 0
JOINT SWELLING: 0
FEVER: 0
PALPITATIONS: 0
NERVOUS/ANXIOUS: 0

## 2023-03-08 ASSESSMENT — PATIENT HEALTH QUESTIONNAIRE - PHQ9
10. IF YOU CHECKED OFF ANY PROBLEMS, HOW DIFFICULT HAVE THESE PROBLEMS MADE IT FOR YOU TO DO YOUR WORK, TAKE CARE OF THINGS AT HOME, OR GET ALONG WITH OTHER PEOPLE: SOMEWHAT DIFFICULT
SUM OF ALL RESPONSES TO PHQ QUESTIONS 1-9: 9
SUM OF ALL RESPONSES TO PHQ QUESTIONS 1-9: 9

## 2023-03-08 ASSESSMENT — PAIN SCALES - GENERAL: PAINLEVEL: MODERATE PAIN (4)

## 2023-03-08 NOTE — PROGRESS NOTES
SUBJECTIVE:   CC: Westley is an 52 year old who presents for preventative health visit.     Patient has been advised of split billing requirements and indicates understanding: Yes  Healthy Habits:     Getting at least 3 servings of Calcium per day:  Yes    Bi-annual eye exam:  Yes    Dental care twice a year:  Yes    Sleep apnea or symptoms of sleep apnea:  None    Diet:  Regular (no restrictions)    Frequency of exercise:  2-3 days/week    Duration of exercise:  30-45 minutes    Taking medications regularly:  Yes    Medication side effects:  Other    PHQ-2 Total Score: 4    Additional concerns today:  No    Asthma: Controlled; had a chest cold the last couple and used albuterol.  Only using rescue inhaler 1-2x a month.   ACT Total Scores 10/25/2021 4/27/2022 3/8/2023   ACT TOTAL SCORE - - -   ASTHMA ER VISITS - - -   ASTHMA HOSPITALIZATIONS - - -   ACT TOTAL SCORE (Goal Greater than or Equal to 20) 21 23 22   In the past 12 months, how many times did you visit the emergency room for your asthma without being admitted to the hospital? 0 0 0   In the past 12 months, how many times were you hospitalized overnight because of your asthma? 0 0 0         Hypertension Follow-up      Do you check your blood pressure regularly outside of the clinic? No     Are you following a low salt diet? Yes    Are your blood pressures ever more than 140 on the top number (systolic) OR more   than 90 on the bottom number (diastolic), for example 140/90?     BP Readings from Last 6 Encounters:   03/08/23 112/78   01/24/23 113/80   11/16/22 118/84   10/27/22 111/69   09/21/22 118/81   05/26/22 (!) 140/98   No CP, SOB, edema, vision changes, and palpitations.   Walking the dog 3-4 times a week.   C/o ED.  Able to obtain an erection, but lacks firmness.   Becoming an issue for him and would like to discuss treatment.     Continues to see concussion clinic for ongoing headaches, post concussion syndrome.       Today's PHQ-2 Score:   PHQ-2 ( 1999  [employer]) 3/8/2023   Q1: Little interest or pleasure in doing things 2   Q2: Feeling down, depressed or hopeless 2   PHQ-2 Score 4   PHQ-2 Total Score (12-17 Years)- Positive if 3 or more points; Administer PHQ-A if positive -   Q1: Little interest or pleasure in doing things More than half the days   Q2: Feeling down, depressed or hopeless More than half the days   PHQ-2 Score 4           Social History     Tobacco Use     Smoking status: Former     Packs/day: 0.00     Years: 5.00     Pack years: 0.00     Types: Cigars, Cigarettes     Start date: 2007     Quit date: 2016     Years since quittin.5     Smokeless tobacco: Never     Tobacco comments:     Originally smoked from  to , restarted again .   Substance Use Topics     Alcohol use: Yes     Comment: occasional use         Alcohol Use 3/8/2023   Prescreen: >3 drinks/day or >7 drinks/week? No       Last PSA: No results found for: PSA    Reviewed orders with patient. Reviewed health maintenance and updated orders accordingly - Yes  Labs reviewed in EPIC  BP Readings from Last 3 Encounters:   23 112/78   23 113/80   22 118/84    Wt Readings from Last 3 Encounters:   23 94.8 kg (209 lb)   22 96.6 kg (213 lb)   22 96.3 kg (212 lb 4.8 oz)                  Current Outpatient Medications   Medication Sig Dispense Refill     albuterol (PROAIR HFA/PROVENTIL HFA/VENTOLIN HFA) 108 (90 Base) MCG/ACT inhaler Inhale 2 puffs into the lungs every 6 hours as needed for shortness of breath / dyspnea 8 g 1     amitriptyline (ELAVIL) 50 MG tablet Take 1-2 tablets ( mg) by mouth At Bedtime 60 tablet 0     amLODIPine (NORVASC) 5 MG tablet Take 1 tablet (5 mg) by mouth daily 90 tablet 3     buPROPion (WELLBUTRIN XL) 300 MG 24 hr tablet TAKE 1 TABLET BY MOUTH IN THE MORNING 30 tablet 4     eszopiclone (LUNESTA) 1 MG tablet TAKE 1 TABLET BY MOUTH AT BEDTIME 30 tablet 0     lisdexamfetamine (VYVANSE) 70 MG capsule Take 1  "capsule (70 mg) by mouth every morning 30 capsule 0     methylphenidate (RITALIN) 10 MG tablet Take 1 tablet (10 mg) by mouth 3 times daily 90 tablet 0     mirtazapine (REMERON) 7.5 MG tablet TAKE ONE TABLET BY MOUTH AT BEDTIME 30 tablet 4     mometasone (NASONEX) 50 MCG/ACT nasal spray        ondansetron (ZOFRAN ODT) 4 MG ODT tab Take 1 tablet (4 mg) by mouth every 8 hours as needed for nausea or vomiting 10 tablet 0     propranolol (INDERAL) 80 MG tablet Take 1 tablet (80 mg) by mouth 3 times daily 90 tablet 0     rizatriptan (MAXALT) 10 MG tablet Take 1 tablet (10 mg) by mouth at onset of headache for migraine May repeat in 2 hours. Max 3 tablets/24 hours. 10 tablet 3     sucralfate (CARAFATE) 1 GM/10ML suspension Take 10 mLs (1 g) by mouth 4 times daily as needed (Heartburn, nausea) 414 mL 0     topiramate (TOPAMAX) 50 MG tablet Take 1 tablet (50 mg) by mouth daily 60 tablet 3     Allergies   Allergen Reactions     Aspirin      Cephalosporins      Penicillins        Reviewed and updated as needed this visit by clinical staff   Tobacco  Allergies  Meds  Problems  Med Hx  Surg Hx  Fam Hx          Reviewed and updated as needed this visit by Provider   Tobacco  Allergies  Meds  Problems  Med Hx  Surg Hx  Fam Hx         Past Medical History:   Diagnosis Date     Anxiety      Asthma      Depression      Depressive disorder      Hypertension 2009    On the high end of \"normal\" 120/80s     Kidney infection 2009    Hospitalized x 2     Thyroid disease     Hashimoto's diagnosis 2001      Past Surgical History:   Procedure Laterality Date     ENT SURGERY      nasal polyps times 2     GENITOURINARY SURGERY  2009    Vascetomy     HERNIA REPAIR      under 3 years old     MAMMOPLASTY REDUCTION  age 19       Review of Systems   Constitutional: Negative for chills and fever.   HENT: Negative for congestion, ear pain, hearing loss and sore throat.    Eyes: Negative for pain and visual disturbance.   Respiratory: " Positive for cough. Negative for shortness of breath.    Cardiovascular: Negative for chest pain, palpitations and peripheral edema.   Gastrointestinal: Negative for abdominal pain, constipation, diarrhea, heartburn, hematochezia and nausea.   Genitourinary: Positive for impotence. Negative for dysuria, frequency, genital sores, hematuria, penile discharge and urgency.   Musculoskeletal: Negative for arthralgias, joint swelling and myalgias.   Skin: Negative for rash.   Neurological: Positive for headaches. Negative for dizziness, weakness and paresthesias.   Psychiatric/Behavioral: Negative for mood changes. The patient is not nervous/anxious.      Getting over a URI.   Chronic headaches related to post concussive syndrome.     OBJECTIVE:   /78   Pulse 73   Temp 98.8  F (37.1  C) (Oral)   Resp 16   Ht 1.829 m (6')   Wt 94.8 kg (209 lb)   SpO2 99%   BMI 28.35 kg/m      Physical Exam  GENERAL: healthy, alert and no distress  EYES: Eyes grossly normal to inspection, PERRL and conjunctivae and sclerae normal  HENT: ear canals and TM's normal, nose and mouth without ulcers or lesions  NECK: no adenopathy, no asymmetry, masses, or scars and thyroid normal to palpation  RESP: lungs clear to auscultation - no rales, rhonchi or wheezes  CV: regular rate and rhythm, normal S1 S2, no S3 or S4, no murmur, click or rub, no peripheral edema and peripheral pulses strong  ABDOMEN: soft, nontender, no hepatosplenomegaly, no masses and bowel sounds normal  MS: no gross musculoskeletal defects noted, no edema  SKIN: no suspicious lesions or rashes  NEURO: Normal strength and tone, mentation intact and speech normal  PSYCH: mentation appears normal, affect normal/bright    Diagnostic Test Results:  Labs reviewed in Epic    ASSESSMENT/PLAN:   1. Routine general medical examination at a health care facility  Reviewed age appropriate screenings and immunizations.    - COVID-19,PF,PFIZER BOOSTER BIVALENT (12+YRS)    2. Screen  for colon cancer  Due; cancelled procedure last year.  Discussed stool test though does prefer to do scope.  - Colonoscopy Screening  Referral; Future    3. Benign essential hypertension  Chronic, controlled.  Continue current medication.   - amLODIPine (NORVASC) 5 MG tablet; Take 1 tablet (5 mg) by mouth daily  Dispense: 90 tablet; Refill: 3  - Comprehensive metabolic panel (BMP + Alb, Alk Phos, ALT, AST, Total. Bili, TP); Future  - Comprehensive metabolic panel (BMP + Alb, Alk Phos, ALT, AST, Total. Bili, TP)    4. Erectile dysfunction, unspecified erectile dysfunction type  Discussed side effects to medication and that it can be expensive.  Can send an update via [employer].   - tadalafil (CIALIS) 10 MG tablet; Take 1 tablet (10 mg) by mouth daily as needed Take 30 minutes prior to sexual activity, no more than once daily.  Dispense: 12 tablet; Refill: 11    5. Major depressive disorder, recurrent episode, moderate (H)  Managed by concussion clinic, stable.  Continue current meds.     6. 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     COUNSELING:   Reviewed preventive health counseling, as reflected in patient instructions      BMI:   Estimated body mass index is 28.35 kg/m  as calculated from the following:    Height as of this encounter: 1.829 m (6').    Weight as of this encounter: 94.8 kg (209 lb).   Weight management plan: Discussed healthy diet and exercise guidelines      He reports that he quit smoking about 6 years ago. His smoking use included cigars and cigarettes. He started smoking about 16 years ago. He has never used smokeless tobacco.            Abby Fuentes, RUI CNP  [facility]

## 2023-03-15 RX ORDER — RIZATRIPTAN BENZOATE 10 MG/1
5 TABLET ORAL
Qty: 10 TABLET | Refills: 3 | Status: SHIPPED | OUTPATIENT
Start: 2023-03-15 | End: 2023-12-14

## 2023-03-16 ENCOUNTER — MYC REFILL (OUTPATIENT)
Dept: NEUROLOGY | Facility: CLINIC | Age: 53
End: 2023-03-16
Payer: COMMERCIAL

## 2023-03-16 DIAGNOSIS — F07.81 POST CONCUSSION SYNDROME: ICD-10-CM

## 2023-03-16 DIAGNOSIS — R41.840 ATTENTION AND CONCENTRATION DEFICIT: ICD-10-CM

## 2023-03-16 RX ORDER — LISDEXAMFETAMINE DIMESYLATE 70 MG/1
70 CAPSULE ORAL EVERY MORNING
Qty: 30 CAPSULE | Refills: 0 | Status: SHIPPED | OUTPATIENT
Start: 2023-03-16 | End: 2023-04-12

## 2023-03-27 DIAGNOSIS — R41.840 ATTENTION AND CONCENTRATION DEFICIT: ICD-10-CM

## 2023-03-27 DIAGNOSIS — F07.81 POST CONCUSSION SYNDROME: ICD-10-CM

## 2023-03-27 DIAGNOSIS — S06.9XAS MOOD DISORDER AS LATE EFFECT OF TRAUMATIC BRAIN INJURY (H): ICD-10-CM

## 2023-03-27 DIAGNOSIS — F06.30 MOOD DISORDER AS LATE EFFECT OF TRAUMATIC BRAIN INJURY (H): ICD-10-CM

## 2023-03-28 RX ORDER — BUPROPION HYDROCHLORIDE 300 MG/1
TABLET ORAL
Qty: 30 TABLET | Refills: 4 | Status: SHIPPED | OUTPATIENT
Start: 2023-03-28 | End: 2023-12-27

## 2023-03-28 NOTE — TELEPHONE ENCOUNTER
Medication refill request for buPROPion (WELLBUTRIN XL) 300 MG 24 hr tablet.     Last Written Prescription Date:  9/27/22  Last Fill Quantity: 30,  # refills: 4  Last office visit provider:  1/25/2023  Next appointment scheduled:   4/12/2023 9:00 AM (Arrive by 8:45 AM) Chrissy Saenz APRN [facility]       Medication T'd for review and signature    YANA Coombs on 3/28/2023 at 8:29 AM

## 2023-04-04 ENCOUNTER — HOSPITAL ENCOUNTER (OUTPATIENT)
Dept: SPEECH THERAPY | Facility: CLINIC | Age: 53
Discharge: HOME | End: 2023-04-04
Payer: COMMERCIAL

## 2023-04-04 PROCEDURE — 97130 THER IVNTJ EA ADDL 15 MIN: CPT | Mod: GN | Performed by: SPEECH-LANGUAGE PATHOLOGIST

## 2023-04-04 PROCEDURE — 97129 THER IVNTJ 1ST 15 MIN: CPT | Mod: GN | Performed by: SPEECH-LANGUAGE PATHOLOGIST

## 2023-04-04 NOTE — PROGRESS NOTES
[facility]    Outpatient Speech Language Pathology Discharge Note  Patient: Westley Singh  : 1970    Beginning/End Dates of Reporting Period:  2023 to 2023    Referring Provider: Chrissy FABIAN CNP    Therapy Diagnosis: Mild to moderate cognitive linguisic impairments secondary to post concussion syndrome    Client Self Report: Pt reports frustration with persisitent concussion symptoms. Pt has noticed increased pain and expressed frustration with learning new work related updates.    Objective Measurements:      Goals:  Goal Identifier LTG 1A-Cognition-Memory   Goal Description Pt will verbalize and independently impliment trained memory strategies during his work day to support ST and LTM function with a target HA of <6/10.   Target Date 23   Date Met   23   Progress (detail required for progress note):  Pt independently implements strategies at work to promote success with short and long term memory. Pt still experiencing HA symptoms but is managing as needed.       Goal Identifier LTG 2A-Cognition-Logic   Goal Description Pt will verbalize and independently impliment trained reasoning strategies during his work day with a target HA of <6/10 to support organization and prioritization of daily tasks.   Target Date 23   Date Met   23   Progress (detail required for progress note):  Pt independently implements strategies at work to promote success with reasoning. Pt still experiencing HA symptoms but is managing as needed.       Goal Identifier LTG 3A-Attention   Goal Description Pt will verbalize and independently implement trained strategies to support and reduce necessity of multitasking for improved attention and efficiency at work with a target HA of <6/10.   Target Date 23   Date Met   23   Progress (detail required for progress note):  Pt independently  implements strategies at work to promote success with attention. Pt still experiencing HA symptoms but is managing as needed.           Plan:  Discharge from therapy.    Discharge: Yes    Reason for Discharge: Pt has met all goals. No further expectation of progress given ongoing migraines.     Equipment Issued: N/A    Discharge Plan: Patient to continue home program.    [facility] you [facility]. Please call Pooja Baker MA, SLP-CCC at [phone] email [email or web address] with any questions or concerns.      Pooja Baker M.A., SLP-CCC  Speech-Language Pathologist

## 2023-04-06 ENCOUNTER — MYC REFILL (OUTPATIENT)
Dept: NEUROLOGY | Facility: CLINIC | Age: 53
End: 2023-04-06
Payer: COMMERCIAL

## 2023-04-06 DIAGNOSIS — G43.719 CHRONIC MIGRAINE WITHOUT AURA, INTRACTABLE, WITHOUT STATUS MIGRAINOSUS: ICD-10-CM

## 2023-04-06 DIAGNOSIS — F07.81 POST CONCUSSION SYNDROME: ICD-10-CM

## 2023-04-06 DIAGNOSIS — G44.309 POST-CONCUSSION HEADACHE: ICD-10-CM

## 2023-04-06 DIAGNOSIS — G47.00 INSOMNIA, UNSPECIFIED TYPE: ICD-10-CM

## 2023-04-06 DIAGNOSIS — R41.840 ATTENTION AND CONCENTRATION DEFICIT: ICD-10-CM

## 2023-04-06 RX ORDER — AMITRIPTYLINE HYDROCHLORIDE 50 MG/1
50-100 TABLET ORAL AT BEDTIME
Qty: 60 TABLET | Refills: 0 | Status: SHIPPED | OUTPATIENT
Start: 2023-04-06 | End: 2023-05-03

## 2023-04-06 RX ORDER — PROPRANOLOL HYDROCHLORIDE 80 MG/1
80 TABLET ORAL 3 TIMES DAILY
Qty: 90 TABLET | Refills: 0 | Status: SHIPPED | OUTPATIENT
Start: 2023-04-06 | End: 2023-05-04

## 2023-04-06 RX ORDER — METHYLPHENIDATE HYDROCHLORIDE 10 MG/1
10 TABLET ORAL 3 TIMES DAILY
Qty: 90 TABLET | Refills: 0 | Status: SHIPPED | OUTPATIENT
Start: 2023-04-06 | End: 2023-04-12

## 2023-04-12 ENCOUNTER — OFFICE VISIT (OUTPATIENT)
Dept: NEUROLOGY | Facility: CLINIC | Age: 53
End: 2023-04-12
Payer: COMMERCIAL

## 2023-04-12 VITALS — SYSTOLIC BLOOD PRESSURE: 122 MMHG | HEART RATE: 65 BPM | DIASTOLIC BLOOD PRESSURE: 86 MMHG

## 2023-04-12 DIAGNOSIS — R41.840 ATTENTION AND CONCENTRATION DEFICIT: ICD-10-CM

## 2023-04-12 DIAGNOSIS — F07.81 POST CONCUSSION SYNDROME: Primary | ICD-10-CM

## 2023-04-12 DIAGNOSIS — G44.309 POST-CONCUSSION HEADACHE: ICD-10-CM

## 2023-04-12 PROCEDURE — 99214 OFFICE O/P EST MOD 30 MIN: CPT | Performed by: NURSE PRACTITIONER

## 2023-04-12 RX ORDER — LISDEXAMFETAMINE DIMESYLATE 70 MG/1
70 CAPSULE ORAL EVERY MORNING
Qty: 30 CAPSULE | Refills: 0 | Status: SHIPPED | OUTPATIENT
Start: 2023-04-12 | End: 2023-05-18

## 2023-04-12 RX ORDER — METHYLPHENIDATE HYDROCHLORIDE 10 MG/1
10 TABLET ORAL 3 TIMES DAILY
Qty: 90 TABLET | Refills: 0 | Status: SHIPPED | OUTPATIENT
Start: 2023-04-12 | End: 2023-12-27
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
## 2023-04-12 NOTE — NURSING NOTE
Chief Complaint   Patient presents with     Follow Up     Concussion           11/16/2022    11:00 AM 1/25/2023     9:46 AM 4/12/2023     8:00 AM   CONCUSSION SYMPTOMS ASSESSMENT   Headache or Pressure In Head 2 - mild to moderate 3 - moderate 3 - moderate   Upset Stomach or Throwing Up 0 - none 0 - none 0 - none   Problems with Balance 0 - none 0 - none 1 - mild   Feeling Dizzy 0 - none 1 - mild 1 - mild   Sensitivity to Light 1 - mild 1 - mild 2 - mild to moderate   Sensitivity to Noise 0 - none 1 - mild 0 - none   Mood Changes 1 - mild 1 - mild 2 - mild to moderate   Feeling sluggish, hazy, or foggy 2 - mild to moderate 3 - moderate 2 - mild to moderate   Trouble Concentrating, Lack of Focus 3 - moderate 3 - moderate 4 - moderate to severe   Motion Sickness 0 - none 0 - none 0 - none   Vision Changes 0 - none 1 - mild 1 - mild   Memory Problems 1 - mild 1 - mild 2 - mild to moderate   Feeling Confused 0 - none 1 - mild 0 - none   Neck Pain 0 - none 0 - none 0 - none   Trouble Sleeping 0 - none 1 - mild 0 - none   Total Number of Symptoms 6 11 9   Symptom Severity Score 10 17 18

## 2023-04-20 ENCOUNTER — MYC REFILL (OUTPATIENT)
Dept: NEUROLOGY | Facility: CLINIC | Age: 53
End: 2023-04-20
Payer: COMMERCIAL

## 2023-04-20 DIAGNOSIS — G47.00 INSOMNIA, UNSPECIFIED TYPE: ICD-10-CM

## 2023-04-20 DIAGNOSIS — J45.30 MILD PERSISTENT ASTHMA WITHOUT COMPLICATION: ICD-10-CM

## 2023-04-21 RX ORDER — ESZOPICLONE 1 MG/1
1 TABLET, FILM COATED ORAL AT BEDTIME
Qty: 30 TABLET | Refills: 0 | Status: SHIPPED | OUTPATIENT
Start: 2023-04-21 | End: 2023-07-18

## 2023-04-21 NOTE — TELEPHONE ENCOUNTER
Medication refill request for eszopiclone (LUNESTA) 1 MG tablet.     Last Written Prescription Date:  12/27/2023  Last Fill Quantity: 30,  # refills: 0  Last office visit provider:  4/12/23  Next appointment scheduled: 6/28/23    Medication T'd for review and signature    YANA Coombs on 4/21/2023 at 10:44 AM

## 2023-04-24 RX ORDER — ALBUTEROL SULFATE 90 UG/1
AEROSOL, METERED RESPIRATORY (INHALATION)
Qty: 18 G | Refills: 0 | Status: SHIPPED | OUTPATIENT
Start: 2023-04-24 | End: 2023-05-31

## 2023-04-24 NOTE — TELEPHONE ENCOUNTER
Routing refill request to provider for review/approval because:  A break in medication    Sanaz TANNER RN, BSN, PHN  [facility]  [phone]

## 2023-05-03 DIAGNOSIS — G47.00 INSOMNIA, UNSPECIFIED TYPE: ICD-10-CM

## 2023-05-03 DIAGNOSIS — F07.81 POST CONCUSSION SYNDROME: ICD-10-CM

## 2023-05-03 DIAGNOSIS — G43.719 CHRONIC MIGRAINE WITHOUT AURA, INTRACTABLE, WITHOUT STATUS MIGRAINOSUS: ICD-10-CM

## 2023-05-03 NOTE — TELEPHONE ENCOUNTER
Medication refill request for amitriptyline (ELAVIL) 50 MG tablet    Last Written Prescription Date:  4/6/2023  Last Fill Quantity: 60,  # refills: 0    Last office visit provider:  4/12/2023    Next appointment scheduled: Next appt is scheduled with Chrissy on 6/28/2023 but will send message to the  to call the pt to reschedule her appt.    Medication T'd for review and signature      :    Martine/Jumana,    Pt is scheduled to see Chrissy on 6/28/2023 but if the pt can be contacted to cancel her appt with Chrissy and schedule him to see one of the providers. Thank you.      YANA Coombs on 5/3/2023 at 10:54 AM

## 2023-05-04 RX ORDER — PROPRANOLOL HYDROCHLORIDE 80 MG/1
TABLET ORAL
Qty: 90 TABLET | Refills: 0 | Status: SHIPPED | OUTPATIENT
Start: 2023-05-04 | End: 2023-06-29

## 2023-05-04 RX ORDER — AMITRIPTYLINE HYDROCHLORIDE 50 MG/1
TABLET ORAL
Qty: 60 TABLET | Refills: 1 | Status: SHIPPED | OUTPATIENT
Start: 2023-05-04 | End: 2023-07-18

## 2023-05-04 NOTE — TELEPHONE ENCOUNTER
Medication refill request for propranolol (INDERAL) 80 MG tablet     Last Written Prescription Date:  4/6/2023  Last Fill Quantity: 90,  # refills: 0     Last office visit provider:  4/12/2023     Next appointment scheduled: Next appt is scheduled with Chrissy on 6/28/2023 but I have already sent message to the  to call the pt to reschedule her appt to see a different provider.       Medication T'd for review and signature    YANA Coombs on 5/4/2023 at 9:46 AM

## 2023-05-18 ENCOUNTER — OFFICE VISIT (OUTPATIENT)
Dept: PHYSICAL MEDICINE AND REHAB | Facility: CLINIC | Age: 53
End: 2023-05-18
Payer: COMMERCIAL

## 2023-05-18 ENCOUNTER — MYC REFILL (OUTPATIENT)
Dept: NEUROLOGY | Facility: CLINIC | Age: 53
End: 2023-05-18

## 2023-05-18 VITALS — SYSTOLIC BLOOD PRESSURE: 105 MMHG | DIASTOLIC BLOOD PRESSURE: 67 MMHG | HEART RATE: 75 BPM

## 2023-05-18 DIAGNOSIS — R41.840 ATTENTION AND CONCENTRATION DEFICIT: ICD-10-CM

## 2023-05-18 DIAGNOSIS — M54.81 BILATERAL OCCIPITAL NEURALGIA: Primary | ICD-10-CM

## 2023-05-18 DIAGNOSIS — F07.81 POST CONCUSSION SYNDROME: ICD-10-CM

## 2023-05-18 DIAGNOSIS — G43.719 CHRONIC MIGRAINE WITHOUT AURA, INTRACTABLE, WITHOUT STATUS MIGRAINOSUS: ICD-10-CM

## 2023-05-18 PROCEDURE — 64405 NJX AA&/STRD GR OCPL NRV: CPT | Mod: 50 | Performed by: PHYSICAL MEDICINE & REHABILITATION

## 2023-05-18 RX ORDER — LISDEXAMFETAMINE DIMESYLATE 70 MG/1
70 CAPSULE ORAL EVERY MORNING
Qty: 30 CAPSULE | Refills: 0 | Status: SHIPPED | OUTPATIENT
Start: 2023-05-18 | End: 2023-07-18

## 2023-05-18 RX ORDER — TOPIRAMATE 50 MG/1
50 TABLET, FILM COATED ORAL 2 TIMES DAILY
Qty: 60 TABLET | Refills: 3 | Status: SHIPPED | OUTPATIENT
Start: 2023-05-18 | End: 2023-11-27

## 2023-05-18 RX ORDER — TRIAMCINOLONE ACETONIDE 40 MG/ML
40 INJECTION, SUSPENSION INTRA-ARTICULAR; INTRAMUSCULAR ONCE
Status: COMPLETED | OUTPATIENT
Start: 2023-05-18 | End: 2023-05-18

## 2023-05-18 RX ORDER — BUPIVACAINE HYDROCHLORIDE 5 MG/ML
7 INJECTION, SOLUTION PERINEURAL ONCE
Status: COMPLETED | OUTPATIENT
Start: 2023-05-18 | End: 2023-05-18

## 2023-05-18 RX ADMIN — BUPIVACAINE HYDROCHLORIDE 35 MG: 5 INJECTION, SOLUTION PERINEURAL at 16:04

## 2023-05-18 RX ADMIN — TRIAMCINOLONE ACETONIDE 40 MG: 40 INJECTION, SUSPENSION INTRA-ARTICULAR; INTRAMUSCULAR at 16:04

## 2023-05-18 NOTE — PROGRESS NOTES
[facility]     PHYSICAL MEDICINE & REHABILITATION CLINIC NOTE  OCCIPITAL NERVE BLOCK      Chief Complaint   Patient presents with     Trigger Point Injection       HPI:  Westley Singh is a 53 year old male with a history of chronic post-traumatic migraine headaches who presents today for bilateral occipital nerve blocks with the goal of minimizing him occipital headaches. He was last seen on 1/24/2023 for occipital nerve blocks, at which time he was also advised to limit his use of Tylenol to no more than 4000 mg every other day, which he has done successfully. He currently takes it no more than half the days of the month. Additionally,his topiramate was increased from 25 mg to 50 mg daily.     Headache pain continues to be daily, but intensity has improved to 3-4/10. He is now having 2-3 migraine headache days per month, which is a big improvement. Migraine headaches are rated 7-8/10 and are debilitating, associated with nausea and light sensitivity. Pain is more widespread throughout his head rather than isolated to the base of his skull. Maxalt works well for his headaches - he wishes he could take it more often.       Current Outpatient Medications   Medication Sig Dispense Refill     amitriptyline (ELAVIL) 50 MG tablet TAKE 1 TO 2 TABLETS BY MOUTH AT BEDTIME 60 tablet 1     amLODIPine (NORVASC) 5 MG tablet Take 1 tablet (5 mg) by mouth daily 90 tablet 3     buPROPion (WELLBUTRIN XL) 300 MG 24 hr tablet TAKE 1 TABLET BY MOUTH IN THE MORNING 30 tablet 4     eszopiclone (LUNESTA) 1 MG tablet Take 1 tablet (1 mg) by mouth At Bedtime 30 tablet 0     lisdexamfetamine (VYVANSE) 70 MG capsule Take 1 capsule (70 mg) by mouth every morning 30 capsule 0     methylphenidate (RITALIN) 10 MG tablet Take 1 tablet (10 mg) by mouth 3 times daily 90 tablet 0     mometasone (NASONEX) 50 MCG/ACT nasal spray        ondansetron (ZOFRAN ODT) 4 MG ODT tab Take 1 tablet (4 mg) by mouth every 8  hours as needed for nausea or vomiting 10 tablet 0     propranolol (INDERAL) 80 MG tablet TAKE ONE TABLET BY MOUTH THREE TIMES DAILY 90 tablet 0     rizatriptan (MAXALT) 10 MG tablet Take 0.5 tablets (5 mg) by mouth at onset of headache for migraine (May repeat in 2 hours.  Max of 3 Tablets my mouth in 24 hour period) 10 tablet 3     sucralfate (CARAFATE) 1 GM/10ML suspension Take 10 mLs (1 g) by mouth 4 times daily as needed (Heartburn, nausea) 414 mL 0     tadalafil (CIALIS) 10 MG tablet Take 1 tablet (10 mg) by mouth daily as needed Take 30 minutes prior to sexual activity, no more than once daily. 12 tablet 11     topiramate (TOPAMAX) 50 MG tablet Take 1 tablet (50 mg) by mouth daily 60 tablet 3     VENTOLIN  (90 Base) MCG/ACT inhaler INHALE TWO PUFFS BY MOUTH EVERY SIX HOURS AS NEEDED FOR SHORTNESS OF BREATH OR DYSPNEA 18 g 0       Allergies   Allergen Reactions     Aspirin      Cephalosporins      Penicillins          PHYSICAL EXAM:  VS: /67   Pulse 75    GEN: Patient is pleasant and cooperative  SKIN: No lesions or abrasions on exposed skin      PROCEDURE: Right and Left greater occipital nerve block.     Prior to the start of the procedure and with procedural staff participation, I verbally confirmed the patient s identity using two indicators, relevant allergies, that the procedure was appropriate and matched the consent or emergent situation, and that the correct equipment/implants were available. Immediately prior to starting the procedure I conducted the Time Out with the procedural staff and re-confirmed the patient s name, procedure, and site/side. (The Joint Commission universal protocol was followed.)  Yes    Sedation (Moderate or Deep): None    Dru% lidocaine: 2 ml   0.5% bupivacaine: 7 ml   Kenalo mg = 1 ml       Area just inferior to insertion of the right and left superior trapezius insertion onto skull was cleansed with ChloraPrep. Needle was advanced anteriorly to base  of skull then slightly withdrawn and injectate was injected in a fan-like distribution at different depths. A 10 ml mixture of 1% lidocaine, 0.5% bupivacaine and Kenalog was divided into two 5 ml syringes. Total injection volume = 4 ml on the right side and 6 ml on the left side.     Westley Singh tolerated the procedure well without any immediate complications. he was allowed to recover for an appropriate period of time and was discharged home in stable condition.  Patient will follow-up regarding response to this procedure.      ASSESSMENT/PLAN:  Westley Singh is a 53 year old male who presents to clinic for bilateral occipital nerve blocks. Patient tolerated the procedure well, and noted a reduction in pain following the procedure.     Increased topiramate from 50 mg daily to 50 mg bid. He will continue minimizing his Tylenol use. At this point, he is a candidate for a trial of Botox, so plan will be to request prior authorization from his insurer for 155 units of Botox every 3 months for migraine headache prophylaxis.     Follow up in clinic in 2 months at which time we will likely do Botox injections assuming approval has been obtained, and/or ONBs.       Kim Long MD

## 2023-05-18 NOTE — NURSING NOTE
Chief Complaint   Patient presents with     Trigger Point Injection       YANA Coombs on 5/18/2023 at 1:15 PM

## 2023-05-18 NOTE — TELEPHONE ENCOUNTER
Previous pt of Erum    Medication refill request for lisdexamfetamine (VYVANSE) 70 MG capsule    Last Written Prescription Date:  4/12/2023  Last Fill Quantity: 30,  # refills: 0    Last office visit provider:  4/12/2023    Next appointment scheduled:   6/14/2023 2:00 PM (Arrive by 1:45 PM) Dania Montana MD     Medication T'd for review and signature    YANA Coombs on 5/18/2023 at 12:03 PM

## 2023-05-18 NOTE — LETTER
5/18/2023         RE: Wsetley Singh  [id number] [facility] [address]        Dear Colleague,    Thank you for referring your patient, Westley Singh, to the [facility]. Please see a copy of my visit note below.      [facility]     PHYSICAL MEDICINE & REHABILITATION CLINIC NOTE  OCCIPITAL NERVE BLOCK      Chief Complaint   Patient presents with     Trigger Point Injection       HPI:  Westley Singh is a 53 year old male with a history of chronic post-traumatic migraine headaches who presents today for bilateral occipital nerve blocks with the goal of minimizing him occipital headaches. He was last seen on 1/24/2023 for occipital nerve blocks, at which time he was also advised to limit his use of Tylenol to no more than 4000 mg every other day, which he has done successfully. He currently takes it no more than half the days of the month. Additionally,his topiramate was increased from 25 mg to 50 mg daily.     Headache pain continues to be daily, but intensity has improved to 3-4/10. He is now having 2-3 migraine headache days per month, which is a big improvement. Migraine headaches are rated 7-8/10 and are debilitating, associated with nausea and light sensitivity. Pain is more widespread throughout his head rather than isolated to the base of his skull. Maxalt works well for his headaches - he wishes he could take it more often.       Current Outpatient Medications   Medication Sig Dispense Refill     amitriptyline (ELAVIL) 50 MG tablet TAKE 1 TO 2 TABLETS BY MOUTH AT BEDTIME 60 tablet 1     amLODIPine (NORVASC) 5 MG tablet Take 1 tablet (5 mg) by mouth daily 90 tablet 3     buPROPion (WELLBUTRIN XL) 300 MG 24 hr tablet TAKE 1 TABLET BY MOUTH IN THE MORNING 30 tablet 4     eszopiclone (LUNESTA) 1 MG tablet Take 1 tablet (1 mg) by mouth At Bedtime 30 tablet 0     lisdexamfetamine (VYVANSE) 70 MG capsule Take 1 capsule (70 mg) by mouth every  morning 30 capsule 0     methylphenidate (RITALIN) 10 MG tablet Take 1 tablet (10 mg) by mouth 3 times daily 90 tablet 0     mometasone (NASONEX) 50 MCG/ACT nasal spray        ondansetron (ZOFRAN ODT) 4 MG ODT tab Take 1 tablet (4 mg) by mouth every 8 hours as needed for nausea or vomiting 10 tablet 0     propranolol (INDERAL) 80 MG tablet TAKE ONE TABLET BY MOUTH THREE TIMES DAILY 90 tablet 0     rizatriptan (MAXALT) 10 MG tablet Take 0.5 tablets (5 mg) by mouth at onset of headache for migraine (May repeat in 2 hours.  Max of 3 Tablets my mouth in 24 hour period) 10 tablet 3     sucralfate (CARAFATE) 1 GM/10ML suspension Take 10 mLs (1 g) by mouth 4 times daily as needed (Heartburn, nausea) 414 mL 0     tadalafil (CIALIS) 10 MG tablet Take 1 tablet (10 mg) by mouth daily as needed Take 30 minutes prior to sexual activity, no more than once daily. 12 tablet 11     topiramate (TOPAMAX) 50 MG tablet Take 1 tablet (50 mg) by mouth daily 60 tablet 3     VENTOLIN  (90 Base) MCG/ACT inhaler INHALE TWO PUFFS BY MOUTH EVERY SIX HOURS AS NEEDED FOR SHORTNESS OF BREATH OR DYSPNEA 18 g 0       Allergies   Allergen Reactions     Aspirin      Cephalosporins      Penicillins          PHYSICAL EXAM:  VS: /67   Pulse 75    GEN: Patient is pleasant and cooperative  SKIN: No lesions or abrasions on exposed skin      PROCEDURE: Right and Left greater occipital nerve block.     Prior to the start of the procedure and with procedural staff participation, I verbally confirmed the patient s identity using two indicators, relevant allergies, that the procedure was appropriate and matched the consent or emergent situation, and that the correct equipment/implants were available. Immediately prior to starting the procedure I conducted the Time Out with the procedural staff and re-confirmed the patient s name, procedure, and site/side. (The Joint Commission universal protocol was followed.)  Yes    Sedation (Moderate or Deep):  None    Dru% lidocaine: 2 ml   0.5% bupivacaine: 7 ml   Kenalo mg = 1 ml       Area just inferior to insertion of the right and left superior trapezius insertion onto skull was cleansed with ChloraPrep. Needle was advanced anteriorly to base of skull then slightly withdrawn and injectate was injected in a fan-like distribution at different depths. A 10 ml mixture of 1% lidocaine, 0.5% bupivacaine and Kenalog was divided into two 5 ml syringes. Total injection volume = 4 ml on the right side and 6 ml on the left side.     Westley Singh tolerated the procedure well without any immediate complications. he was allowed to recover for an appropriate period of time and was discharged home in stable condition.  Patient will follow-up regarding response to this procedure.      ASSESSMENT/PLAN:  Westley Singh is a 53 year old male who presents to clinic for bilateral occipital nerve blocks. Patient tolerated the procedure well, and noted a reduction in pain following the procedure.     Increased topiramate from 50 mg daily to 50 mg bid. He will continue minimizing his Tylenol use. At this point, he is a candidate for a trial of Botox, so plan will be to request prior authorization from his insurer for 155 units of Botox every 3 months for migraine headache prophylaxis.     Follow up in clinic in 2 months at which time we will likely do Botox injections assuming approval has been obtained, and/or ONBs.       Kim Long MD         Again, thank you for allowing me to participate in the care of your patient.        Sincerely,        Kim Long MD

## 2023-05-27 DIAGNOSIS — J45.30 MILD PERSISTENT ASTHMA WITHOUT COMPLICATION: ICD-10-CM

## 2023-05-31 RX ORDER — ALBUTEROL SULFATE 90 UG/1
AEROSOL, METERED RESPIRATORY (INHALATION)
Qty: 8.5 G | Refills: 0 | Status: SHIPPED | OUTPATIENT
Start: 2023-05-31 | End: 2023-07-03

## 2023-06-14 ENCOUNTER — OFFICE VISIT (OUTPATIENT)
Dept: PHYSICAL MEDICINE AND REHAB | Facility: CLINIC | Age: 53
End: 2023-06-14
Payer: COMMERCIAL

## 2023-06-14 VITALS — DIASTOLIC BLOOD PRESSURE: 91 MMHG | SYSTOLIC BLOOD PRESSURE: 131 MMHG | HEART RATE: 72 BPM

## 2023-06-14 DIAGNOSIS — F39 MOOD DISORDER (H): ICD-10-CM

## 2023-06-14 DIAGNOSIS — G44.209 TENSION HEADACHE: Primary | ICD-10-CM

## 2023-06-14 PROCEDURE — 99215 OFFICE O/P EST HI 40 MIN: CPT | Performed by: PHYSICAL MEDICINE & REHABILITATION

## 2023-06-14 PROCEDURE — 99417 PROLNG OP E/M EACH 15 MIN: CPT | Performed by: PHYSICAL MEDICINE & REHABILITATION

## 2023-06-14 NOTE — NURSING NOTE
Chief Complaint   Patient presents with     Consult For     Concussion  Transfer of care from Chrissy Saenz
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
## 2023-06-26 ENCOUNTER — MYC REFILL (OUTPATIENT)
Dept: NEUROLOGY | Facility: CLINIC | Age: 53
End: 2023-06-26
Payer: COMMERCIAL

## 2023-06-26 DIAGNOSIS — G43.719 CHRONIC MIGRAINE WITHOUT AURA, INTRACTABLE, WITHOUT STATUS MIGRAINOSUS: ICD-10-CM

## 2023-06-27 ENCOUNTER — MYC MEDICAL ADVICE (OUTPATIENT)
Dept: PHYSICAL MEDICINE AND REHAB | Facility: CLINIC | Age: 53
End: 2023-06-27
Payer: COMMERCIAL

## 2023-06-27 RX ORDER — PROPRANOLOL HYDROCHLORIDE 80 MG/1
80 TABLET ORAL 3 TIMES DAILY
Qty: 90 TABLET | Refills: 0 | OUTPATIENT
Start: 2023-06-27

## 2023-06-27 NOTE — TELEPHONE ENCOUNTER
Refill request for propranolol (INDERAL) 80 MG tablet  Pt has appt with Dr. Quintanilla on 10/4/23  Medication T'd for review and signature  Hilario Brooks, LAT ATC on 6/27/2023 at 9:55 AM     propranolol (INDERAL) 80 MG tablet 90 tablet 0 5/4/2023  No   Sig: TAKE ONE TABLET BY MOUTH THREE TIMES DAILY

## 2023-06-28 DIAGNOSIS — G43.719 CHRONIC MIGRAINE WITHOUT AURA, INTRACTABLE, WITHOUT STATUS MIGRAINOSUS: ICD-10-CM

## 2023-06-29 RX ORDER — PROPRANOLOL HYDROCHLORIDE 80 MG/1
TABLET ORAL
Qty: 90 TABLET | Refills: 0 | OUTPATIENT
Start: 2023-06-29

## 2023-06-29 NOTE — TELEPHONE ENCOUNTER
Writer sent message to patient informing him that I am sending the refill request to Dr. Long for his propranolol.      Violet Grimes RN on 6/29/2023 at 10:37 AM

## 2023-06-29 NOTE — TELEPHONE ENCOUNTER
Medication refill request for     propranolol (INDERAL) 80 MG tablet, Last date refilled:  5/4/2023, Last Fill Quantity: 90,  # refills: 0    Last office visit provider:  6/14/2023 with Dr. Montana  Next appointment scheduled: None      Medication T'd for review and signature    YANA Coombs on 6/29/2023 at 8:16 AM

## 2023-06-29 NOTE — TELEPHONE ENCOUNTER
This medication should be refilled by the initial prescriber or primary care can take the lead in this.     I have no plans to see the patient in the future therefore it will not be fair to refill something I never initiated neither continue to see the patient for the future.     Thanks

## 2023-07-01 DIAGNOSIS — J45.30 MILD PERSISTENT ASTHMA WITHOUT COMPLICATION: ICD-10-CM

## 2023-07-03 RX ORDER — ALBUTEROL SULFATE 90 UG/1
AEROSOL, METERED RESPIRATORY (INHALATION)
Qty: 8.5 G | Refills: 0 | Status: SHIPPED | OUTPATIENT
Start: 2023-07-03 | End: 2023-08-02

## 2023-07-03 NOTE — TELEPHONE ENCOUNTER
Prescription approved per [facility] Refill Protocol.    Sanaz TANNER RN, BSN, PHN  [facility]  [phone]

## 2023-07-11 ENCOUNTER — HOSPITAL ENCOUNTER (OUTPATIENT)
Dept: MRI IMAGING | Facility: CLINIC | Age: 53
Discharge: HOME | End: 2023-07-11
Attending: PHYSICAL MEDICINE & REHABILITATION | Admitting: PHYSICAL MEDICINE & REHABILITATION
Payer: COMMERCIAL

## 2023-07-11 DIAGNOSIS — G44.209 TENSION HEADACHE: ICD-10-CM

## 2023-07-11 PROCEDURE — 70553 MRI BRAIN STEM W/O & W/DYE: CPT

## 2023-07-11 PROCEDURE — A9585 GADOBUTROL INJECTION: HCPCS | Performed by: PHYSICAL MEDICINE & REHABILITATION

## 2023-07-11 PROCEDURE — 255N000002 HC RX 255 OP 636: Performed by: PHYSICAL MEDICINE & REHABILITATION

## 2023-07-11 RX ORDER — GADOBUTROL 604.72 MG/ML
9.5 INJECTION INTRAVENOUS ONCE
Status: COMPLETED | OUTPATIENT
Start: 2023-07-11 | End: 2023-07-11

## 2023-07-11 RX ADMIN — GADOBUTROL 9.5 ML: 604.72 INJECTION INTRAVENOUS at 08:52

## 2023-07-12 ENCOUNTER — OFFICE VISIT (OUTPATIENT)
Dept: NEUROLOGY | Facility: CLINIC | Age: 53
End: 2023-07-12
Attending: PHYSICAL MEDICINE & REHABILITATION
Payer: COMMERCIAL

## 2023-07-12 VITALS
HEART RATE: 64 BPM | RESPIRATION RATE: 16 BRPM | WEIGHT: 210 LBS | DIASTOLIC BLOOD PRESSURE: 80 MMHG | BODY MASS INDEX: 28.48 KG/M2 | SYSTOLIC BLOOD PRESSURE: 122 MMHG

## 2023-07-12 DIAGNOSIS — F07.81 POST CONCUSSION SYNDROME: ICD-10-CM

## 2023-07-12 DIAGNOSIS — G93.5 CHIARI I MALFORMATION (H): ICD-10-CM

## 2023-07-12 DIAGNOSIS — R13.10 DYSPHAGIA, UNSPECIFIED TYPE: ICD-10-CM

## 2023-07-12 DIAGNOSIS — G44.40 MEDICATION OVERUSE HEADACHE: ICD-10-CM

## 2023-07-12 DIAGNOSIS — G43.719 CHRONIC MIGRAINE WITHOUT AURA, INTRACTABLE, WITHOUT STATUS MIGRAINOSUS: Primary | ICD-10-CM

## 2023-07-12 PROCEDURE — 99215 OFFICE O/P EST HI 40 MIN: CPT | Performed by: PSYCHIATRY & NEUROLOGY

## 2023-07-12 NOTE — LETTER
7/12/2023         RE: Westley Singh  [address]  [facility] [address]        Dear Colleague,    Thank you for referring your patient, Westley Singh, to the [facility] [place]. Please see a copy of my visit note below.    NEUROLOGY OUTPATIENT CONSULT NOTE   Jul 12, 2023     CHIEF COMPLAINT/REASON FOR VISIT/REASON FOR CONSULT  Patient presents with:  Headache    REASON FOR CONSULTATION-Chiari malformation    REFERRAL SOURCE  Dr. Dania Montana  CC Dr. Dania Montana    HISTORY OF PRESENT ILLNESS  Westley Singh is a 53 year old male seen today for evaluation of Chiari malformation.  Patient reports that this was diagnosed several years ago.  At that point he was having headaches once or twice a month.  These would generally be all over headaches with photophobia and photophobia.  There was some associated nausea.  These were be about twice a month.    In October 2020 when he had a fall where he fell down.  Since then he has been having almost every day headaches.  These are more in the back of the head though there can be spikes when he tries to do a lot of activities like learning a new thing.  There is photophobia and phonophobia.  Headaches do become full-blown about once a week.  He has been working with Dr. Long who plans to do Botox next.  Has tried occipital nerve blocks which has reduced the pressure in the back of the head.  Has also been taking Tylenol 4000 mg every day.  Has tried propanolol and rizatriptan.  He does have an appointment next week.    He denies any symptoms attributable to the Chiari malformation except for some difficulty swallowing over the last 1 year.  Does complain that it takes him a little bit longer to chew.  Has not seen a speech therapist.  Does feel a bit dizzy and unsteady.  Has some difficulty with depth perception.    Previous history is reviewed and this is unchanged.    PAST MEDICAL/SURGICAL HISTORY  Past Medical History:   Diagnosis  "Date     Anxiety      Asthma      Depression      Depressive disorder      Hypertension 2009    On the high end of \"normal\" 120/80s     Kidney infection 2009    Hospitalized x 2     Thyroid disease     Hashimoto's diagnosis      Patient Active Problem List   Diagnosis     Nasal polyp     CARDIOVASCULAR SCREENING; LDL GOAL LESS THAN 160     Mild persistent asthma     Anxiety     Insomnia     Major depressive disorder, recurrent episode (H)     Major depressive disorder, recurrent episode, moderate (H)     ADD (attention deficit disorder)     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)   Significant for depression, migraines, high blood pressure    FAMILY HISTORY  Family History   Problem Relation Age of Onset     Hypertension Mother      Thyroid Disease Mother         Graves Disease / Hyperactive     Heart Disease Father      Diabetes Son    Significant for diabetes, high blood pressure high cholesterol, heart attack, depression    SOCIAL HISTORY  Social History     Tobacco Use     Smoking status: Former     Packs/day: 0.00     Years: 5.00     Pack years: 0.00     Types: Cigars, Cigarettes     Start date: 2007     Quit date: 2016     Years since quittin.8     Smokeless tobacco: Never     Tobacco comments:     Originally smoked from  to , restarted again .   Vaping Use     Vaping Use: Never used   Substance Use Topics     Alcohol use: Yes     Comment: occasional use     Drug use: No       SYSTEMS REVIEW  Twelve-system ROS was done and other than the HPI this was negative except for ringing in the ears, headaches anxiety, depression, respiratory problems.    MEDICATIONS  albuterol (PROAIR HFA/PROVENTIL HFA/VENTOLIN HFA) 108 (90 Base) MCG/ACT inhaler, INHALE TWO PUFFS BY MOUTH EVERY SIX HOURS AS NEEDED for shortness of breath or dypsnea  amitriptyline (ELAVIL) 50 MG tablet, TAKE 1 TO 2 TABLETS BY MOUTH AT BEDTIME  amLODIPine (NORVASC) 5 MG tablet, Take 1 tablet (5 mg) by mouth " daily  buPROPion (WELLBUTRIN XL) 300 MG 24 hr tablet, TAKE 1 TABLET BY MOUTH IN THE MORNING  methylphenidate (RITALIN) 10 MG tablet, Take 1 tablet (10 mg) by mouth 3 times daily  mometasone (NASONEX) 50 MCG/ACT nasal spray,   propranolol (INDERAL) 80 MG tablet, Take 1 tablet (80 mg) by mouth 3 times daily  rizatriptan (MAXALT) 10 MG tablet, Take 0.5 tablets (5 mg) by mouth at onset of headache for migraine (May repeat in 2 hours.  Max of 3 Tablets my mouth in 24 hour period)  tadalafil (CIALIS) 10 MG tablet, Take 1 tablet (10 mg) by mouth daily as needed Take 30 minutes prior to sexual activity, no more than once daily.  topiramate (TOPAMAX) 50 MG tablet, Take 1 tablet (50 mg) by mouth 2 times daily  eszopiclone (LUNESTA) 1 MG tablet, Take 1 tablet (1 mg) by mouth At Bedtime  lisdexamfetamine (VYVANSE) 70 MG capsule, Take 1 capsule (70 mg) by mouth every morning  ondansetron (ZOFRAN ODT) 4 MG ODT tab, Take 1 tablet (4 mg) by mouth every 8 hours as needed for nausea or vomiting  sucralfate (CARAFATE) 1 GM/10ML suspension, Take 10 mLs (1 g) by mouth 4 times daily as needed (Heartburn, nausea)    botulinum toxin type A (BOTOX) 100 units injection 155 Units         PHYSICAL EXAMINATION  VITALS: /80   Pulse 64   Resp 16   Wt 95.3 kg (210 lb)   BMI 28.48 kg/m    GENERAL: Healthy appearing, alert, no acute distress, normal habitus.  CARDIOVASCULAR: Extremities warm and well perfused. Pulses present.   NEUROLOGICAL:  Patient is awake and oriented to self, place and time.  Attention span is normal.  Memory is grossly intact.  Language is fluent and follows commands appropriately.  Appropriate fund of knowledge. Cranial nerves 2-12 are intact. There is no pronator drift.  Motor exam shows 5/5 strength in all extremities.  Tone is symmetric bilaterally in upper and lower extremities.  Reflexes are symmetric and 2+ in upper extremities and lower extremities. Sensory exam is grossly intact to light touch, pin prick and  vibration.  Finger to nose and heel to shin is without dysmetria.  Romberg is negative.  Gait is normal and the patient is able to do tandem walk and walk on toes and heels.        DIAGNOSTICS  MRI 2023-images reviewed.  Compared to previous MRI.  Vi malformation appears to be stable.  MRI BRAIN:  1.  No acute intracranial finding. No evidence for recent ischemia,  intracranial hemorrhage, or mass.  2.  Mild Chiari 1 malformation.  3.  Extensive presumed inflammatory polypoid mucosal thickening  throughout the sinuses persists, but is improved versus prior.     MRI ORBITS:  1.  Normal orbit MRI.    MRI C spine                                                                   IMPRESSION:  1.  No significant interval change versus 1/7/2022. Degenerative  changes without high-grade central spinal canal stenosis, spinal cord  compression or spinal cord signal abnormality.  2.  Mild Chiari I malformation without syrinx.  3.  Multilevel degenerative neural foraminal narrowing most pronounced  on the left at C6-C7 where it is relatively severe. Correlate for any  left C7 symptoms.    MRI 2022  IMPRESSION:  1.  Chiari I malformation as seen previously with 7 mm cerebellar tonsillar ectopia.  2.  Crowding of structures at the foramen magnum with partial attenuation of CSF flow dorsally on flow imaging. There is intact bidirectional CSF flow ventrally.  3.  No evidence for acute intracranial process.  4.  Extensive paranasal sinus mucosal disease.      RELEVANT LABS  Component      Latest Ref Rn 5/14/2022  11:29 AM 10/27/2022  10:03 AM   WBC      4.0 - 11.0 10e3/uL 5.3     RBC Count      4.40 - 5.90 10e6/uL 4.10 (L)     Hemoglobin      13.3 - 17.7 g/dL 13.7     Hematocrit      40.0 - 53.0 % 40.9     MCV      78 - 100 fL 100     MCH      26.5 - 33.0 pg 33.4 (H)     MCHC      31.5 - 36.5 g/dL 33.5     RDW      10.0 - 15.0 % 11.4     Platelet Count      150 - 450 10e3/uL 211     % Neutrophils      % 57     % Lymphocytes       % 32     % Monocytes      % 10     % Eosinophils      % 1     % Basophils      % 0     % Immature Granulocytes      % 0     NRBCs per 100 WBC      <1 /100 0     Absolute Neutrophils      1.6 - 8.3 10e3/uL 3.0     Absolute Lymphocytes      0.8 - 5.3 10e3/uL 1.7     Absolute Monocytes      0.0 - 1.3 10e3/uL 0.5     Absolute Eosinophils      0.0 - 0.7 10e3/uL 0.1     Absolute Basophils      0.0 - 0.2 10e3/uL 0.0     Absolute Immature Granulocytes      <=0.4 10e3/uL 0.0     Absolute NRBCs      10e3/uL 0.0     Sodium      136 - 145 mmol/L     Potassium      3.4 - 5.3 mmol/L     Chloride      98 - 107 mmol/L     Carbon Dioxide (CO2)      22 - 29 mmol/L     Anion Gap      7 - 15 mmol/L     Urea Nitrogen      6.0 - 20.0 mg/dL     Creatinine      0.67 - 1.17 mg/dL     Calcium      8.6 - 10.0 mg/dL     Glucose      70 - 99 mg/dL     Alkaline Phosphatase      40 - 129 U/L  57    AST      10 - 50 U/L  19    ALT      10 - 50 U/L  22    Protein Total      6.4 - 8.3 g/dL  6.8    Albumin      3.5 - 5.2 g/dL     Bilirubin Total      <=1.2 mg/dL  0.2    GFR Estimate      >60 mL/min/1.73m2     Bilirubin Direct      <=0.5 mg/dL  0.1    Albumin      3.5 - 5.0 g/dL  3.7      Component      Latest Ref Rng 3/8/2023  9:11 AM   WBC      4.0 - 11.0 10e3/uL    RBC Count      4.40 - 5.90 10e6/uL    Hemoglobin      13.3 - 17.7 g/dL    Hematocrit      40.0 - 53.0 %    MCV      78 - 100 fL    MCH      26.5 - 33.0 pg    MCHC      31.5 - 36.5 g/dL    RDW      10.0 - 15.0 %    Platelet Count      150 - 450 10e3/uL    % Neutrophils      %    % Lymphocytes      %    % Monocytes      %    % Eosinophils      %    % Basophils      %    % Immature Granulocytes      %    NRBCs per 100 WBC      <1 /100    Absolute Neutrophils      1.6 - 8.3 10e3/uL    Absolute Lymphocytes      0.8 - 5.3 10e3/uL    Absolute Monocytes      0.0 - 1.3 10e3/uL    Absolute Eosinophils      0.0 - 0.7 10e3/uL    Absolute Basophils      0.0 - 0.2 10e3/uL    Absolute Immature  Granulocytes      <=0.4 10e3/uL    Absolute NRBCs      10e3/uL    Sodium      136 - 145 mmol/L 138    Potassium      3.4 - 5.3 mmol/L 4.6    Chloride      98 - 107 mmol/L 102    Carbon Dioxide (CO2)      22 - 29 mmol/L 26    Anion Gap      7 - 15 mmol/L 10    Urea Nitrogen      6.0 - 20.0 mg/dL 17.3    Creatinine      0.67 - 1.17 mg/dL 0.90    Calcium      8.6 - 10.0 mg/dL 9.8    Glucose      70 - 99 mg/dL 111 (H)    Alkaline Phosphatase      40 - 129 U/L 88    AST      10 - 50 U/L 35    ALT      10 - 50 U/L 44    Protein Total      6.4 - 8.3 g/dL 7.6    Albumin      3.5 - 5.2 g/dL 4.3    Bilirubin Total      <=1.2 mg/dL 0.4    GFR Estimate      >60 mL/min/1.73m2 >90    Bilirubin Direct      <=0.5 mg/dL    Albumin      3.5 - 5.0 g/dL       Legend:  (L) Low  (H) High      OUTSIDE RECORDS  Outside referral notes and chart notes were reviewed and pertinent information has been summarized (in addition to the HPI):-        IMPRESSION/REPORT/PLAN  Chronic migraine without aura, intractable, without status migrainosus  Post concussion syndrome  Chiari I malformation (H)  Dysphagia, unspecified type  Medication overuse headache    This is a 53 year old male with history of headaches suggestive of migraine headaches, history of Chiari I malformation, history of TBI and symptoms suggestive of postconcussion syndrome.  Exam today is noncontributory.    His headaches previously are suggestive of migrainous headaches and would not be consistent with a Chiari I malformation as these were more episodic and global..  On imaging MRI study the Chiari I malformation is mild and stable on repeat imaging.    The worsening of headaches after the head injury could be consistent with postconcussion syndrome.  This should not be related to the Chiari I malformation.    He denies any other symptoms related to the Chiari I malformation except for some swallowing difficulty.  This would also be unlikely related to a mild Chiari malformation.   We will set him up with speech therapy for further evaluation.  Could consider surgical treatment if speech therapy thinks that this is coming more from a Chiari malformation.    For asymptomatic Chiari malformation no repeat imaging is needed (per guidelines).  I can see him back if he has new neurological symptoms.    Continue follow-up with Dr. Montana for postconcussion syndrome/TBI issues.  Follow-up with Dr. Long for his ongoing headaches since the concussion.  He is scheduled for Botox next week.  He is overusing Tylenol which is preventing some of his preventive medications from working.  Recommend cutting down on Tylenol.  Discussed concept of medication overuse headaches.    -     Speech Therapy Referral; Future    Return for Return to PCP or referring provider.    Over 60 minutes were spent coordinating the care for the patient on the day of the encounter.  This includes previsit, during visit and post visit activities as documented above.  Counseling patient.  Reviewing outside notes/chart.  Imaging studies reviewed.  Multiple problems addressed.  (Activities include but not inclusive of reviewing chart, reviewing outside records, reviewing labs and imaging study results as well as the images, patient visit time including getting history and exam,  use if applicable, review of test results with the patient and coming up with a plan in a shared model, counseling patient and family, education and answering patient questions, EMR , EMR diagnosis entry and problem list management, medication reconciliation and prescription management if applicable, paperwork if applicable, printing documents and documentation of the visit activities.)        Jeanmarie Quintanilla MD  Neurologist  [facility]  Tel:- [phone]    This note was dictated using voice recognition software.  Any grammatical or context distortions are unintentional and inherent to the  software.        Again, thank you for allowing me to participate in the care of your patient.        Sincerely,        Jeanmarie Quintanilla MD

## 2023-07-12 NOTE — PROGRESS NOTES
"NEUROLOGY OUTPATIENT CONSULT NOTE   Jul 12, 2023     CHIEF COMPLAINT/REASON FOR VISIT/REASON FOR CONSULT  Patient presents with:  Headache    REASON FOR CONSULTATION-Chiari malformation    REFERRAL SOURCE  Dr. Dania Montana  CC Dr. Dania Montana    HISTORY OF PRESENT ILLNESS  Westley Singh is a 53 year old male seen today for evaluation of Chiari malformation.  Patient reports that this was diagnosed several years ago.  At that point he was having headaches once or twice a month.  These would generally be all over headaches with photophobia and photophobia.  There was some associated nausea.  These were be about twice a month.    In October 2020 when he had a fall where he fell down.  Since then he has been having almost every day headaches.  These are more in the back of the head though there can be spikes when he tries to do a lot of activities like learning a new thing.  There is photophobia and phonophobia.  Headaches do become full-blown about once a week.  He has been working with Dr. Long who plans to do Botox next.  Has tried occipital nerve blocks which has reduced the pressure in the back of the head.  Has also been taking Tylenol 4000 mg every day.  Has tried propanolol and rizatriptan.  He does have an appointment next week.    He denies any symptoms attributable to the Chiari malformation except for some difficulty swallowing over the last 1 year.  Does complain that it takes him a little bit longer to chew.  Has not seen a speech therapist.  Does feel a bit dizzy and unsteady.  Has some difficulty with depth perception.    Previous history is reviewed and this is unchanged.    PAST MEDICAL/SURGICAL HISTORY  Past Medical History:   Diagnosis Date     Anxiety      Asthma      Depression      Depressive disorder      Hypertension 2009    On the high end of \"normal\" 120/80s     Kidney infection 2009    Hospitalized x 2     Thyroid disease     Hashimoto's diagnosis 2001     Patient Active Problem " List   Diagnosis     Nasal polyp     CARDIOVASCULAR SCREENING; LDL GOAL LESS THAN 160     Mild persistent asthma     Anxiety     Insomnia     Major depressive disorder, recurrent episode (H)     Major depressive disorder, recurrent episode, moderate (H)     ADD (attention deficit disorder)     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)   Significant for depression, migraines, high blood pressure    FAMILY HISTORY  Family History   Problem Relation Age of Onset     Hypertension Mother      Thyroid Disease Mother         Graves Disease / Hyperactive     Heart Disease Father      Diabetes Son    Significant for diabetes, high blood pressure high cholesterol, heart attack, depression    SOCIAL HISTORY  Social History     Tobacco Use     Smoking status: Former     Packs/day: 0.00     Years: 5.00     Pack years: 0.00     Types: Cigars, Cigarettes     Start date: 2007     Quit date: 2016     Years since quittin.8     Smokeless tobacco: Never     Tobacco comments:     Originally smoked from  to , restarted again .   Vaping Use     Vaping Use: Never used   Substance Use Topics     Alcohol use: Yes     Comment: occasional use     Drug use: No       SYSTEMS REVIEW  Twelve-system ROS was done and other than the HPI this was negative except for ringing in the ears, headaches anxiety, depression, respiratory problems.    MEDICATIONS  albuterol (PROAIR HFA/PROVENTIL HFA/VENTOLIN HFA) 108 (90 Base) MCG/ACT inhaler, INHALE TWO PUFFS BY MOUTH EVERY SIX HOURS AS NEEDED for shortness of breath or dypsnea  amitriptyline (ELAVIL) 50 MG tablet, TAKE 1 TO 2 TABLETS BY MOUTH AT BEDTIME  amLODIPine (NORVASC) 5 MG tablet, Take 1 tablet (5 mg) by mouth daily  buPROPion (WELLBUTRIN XL) 300 MG 24 hr tablet, TAKE 1 TABLET BY MOUTH IN THE MORNING  methylphenidate (RITALIN) 10 MG tablet, Take 1 tablet (10 mg) by mouth 3 times daily  mometasone (NASONEX) 50 MCG/ACT nasal spray,   propranolol (INDERAL) 80 MG  tablet, Take 1 tablet (80 mg) by mouth 3 times daily  rizatriptan (MAXALT) 10 MG tablet, Take 0.5 tablets (5 mg) by mouth at onset of headache for migraine (May repeat in 2 hours.  Max of 3 Tablets my mouth in 24 hour period)  tadalafil (CIALIS) 10 MG tablet, Take 1 tablet (10 mg) by mouth daily as needed Take 30 minutes prior to sexual activity, no more than once daily.  topiramate (TOPAMAX) 50 MG tablet, Take 1 tablet (50 mg) by mouth 2 times daily  eszopiclone (LUNESTA) 1 MG tablet, Take 1 tablet (1 mg) by mouth At Bedtime  lisdexamfetamine (VYVANSE) 70 MG capsule, Take 1 capsule (70 mg) by mouth every morning  ondansetron (ZOFRAN ODT) 4 MG ODT tab, Take 1 tablet (4 mg) by mouth every 8 hours as needed for nausea or vomiting  sucralfate (CARAFATE) 1 GM/10ML suspension, Take 10 mLs (1 g) by mouth 4 times daily as needed (Heartburn, nausea)    botulinum toxin type A (BOTOX) 100 units injection 155 Units         PHYSICAL EXAMINATION  VITALS: /80   Pulse 64   Resp 16   Wt 95.3 kg (210 lb)   BMI 28.48 kg/m    GENERAL: Healthy appearing, alert, no acute distress, normal habitus.  CARDIOVASCULAR: Extremities warm and well perfused. Pulses present.   NEUROLOGICAL:  Patient is awake and oriented to self, place and time.  Attention span is normal.  Memory is grossly intact.  Language is fluent and follows commands appropriately.  Appropriate fund of knowledge. Cranial nerves 2-12 are intact. There is no pronator drift.  Motor exam shows 5/5 strength in all extremities.  Tone is symmetric bilaterally in upper and lower extremities.  Reflexes are symmetric and 2+ in upper extremities and lower extremities. Sensory exam is grossly intact to light touch, pin prick and vibration.  Finger to nose and heel to shin is without dysmetria.  Romberg is negative.  Gait is normal and the patient is able to do tandem walk and walk on toes and heels.        DIAGNOSTICS  MRI 2023-images reviewed.  Compared to previous MRI.   Vi malformation appears to be stable.  MRI BRAIN:  1.  No acute intracranial finding. No evidence for recent ischemia,  intracranial hemorrhage, or mass.  2.  Mild Chiari 1 malformation.  3.  Extensive presumed inflammatory polypoid mucosal thickening  throughout the sinuses persists, but is improved versus prior.     MRI ORBITS:  1.  Normal orbit MRI.    MRI C spine                                                                   IMPRESSION:  1.  No significant interval change versus 1/7/2022. Degenerative  changes without high-grade central spinal canal stenosis, spinal cord  compression or spinal cord signal abnormality.  2.  Mild Chiari I malformation without syrinx.  3.  Multilevel degenerative neural foraminal narrowing most pronounced  on the left at C6-C7 where it is relatively severe. Correlate for any  left C7 symptoms.    MRI 2022  IMPRESSION:  1.  Chiari I malformation as seen previously with 7 mm cerebellar tonsillar ectopia.  2.  Crowding of structures at the foramen magnum with partial attenuation of CSF flow dorsally on flow imaging. There is intact bidirectional CSF flow ventrally.  3.  No evidence for acute intracranial process.  4.  Extensive paranasal sinus mucosal disease.      RELEVANT LABS  Component      Latest Ref [facility] 5/14/2022  11:29 AM 10/27/2022  10:03 AM   WBC      4.0 - 11.0 10e3/uL 5.3     RBC Count      4.40 - 5.90 10e6/uL 4.10 (L)     Hemoglobin      13.3 - 17.7 g/dL 13.7     Hematocrit      40.0 - 53.0 % 40.9     MCV      78 - 100 fL 100     MCH      26.5 - 33.0 pg 33.4 (H)     MCHC      31.5 - 36.5 g/dL 33.5     RDW      10.0 - 15.0 % 11.4     Platelet Count      150 - 450 10e3/uL 211     % Neutrophils      % 57     % Lymphocytes      % 32     % Monocytes      % 10     % Eosinophils      % 1     % Basophils      % 0     % Immature Granulocytes      % 0     NRBCs per 100 WBC      <1 /100 0     Absolute Neutrophils      1.6 - 8.3 10e3/uL 3.0     Absolute Lymphocytes      0.8 -  5.3 10e3/uL 1.7     Absolute Monocytes      0.0 - 1.3 10e3/uL 0.5     Absolute Eosinophils      0.0 - 0.7 10e3/uL 0.1     Absolute Basophils      0.0 - 0.2 10e3/uL 0.0     Absolute Immature Granulocytes      <=0.4 10e3/uL 0.0     Absolute NRBCs      10e3/uL 0.0     Sodium      136 - 145 mmol/L     Potassium      3.4 - 5.3 mmol/L     Chloride      98 - 107 mmol/L     Carbon Dioxide (CO2)      22 - 29 mmol/L     Anion Gap      7 - 15 mmol/L     Urea Nitrogen      6.0 - 20.0 mg/dL     Creatinine      0.67 - 1.17 mg/dL     Calcium      8.6 - 10.0 mg/dL     Glucose      70 - 99 mg/dL     Alkaline Phosphatase      40 - 129 U/L  57    AST      10 - 50 U/L  19    ALT      10 - 50 U/L  22    Protein Total      6.4 - 8.3 g/dL  6.8    Albumin      3.5 - 5.2 g/dL     Bilirubin Total      <=1.2 mg/dL  0.2    GFR Estimate      >60 mL/min/1.73m2     Bilirubin Direct      <=0.5 mg/dL  0.1    Albumin      3.5 - 5.0 g/dL  3.7      Component      Latest Ref [facility] 3/8/2023  9:11 AM   WBC      4.0 - 11.0 10e3/uL    RBC Count      4.40 - 5.90 10e6/uL    Hemoglobin      13.3 - 17.7 g/dL    Hematocrit      40.0 - 53.0 %    MCV      78 - 100 fL    MCH      26.5 - 33.0 pg    MCHC      31.5 - 36.5 g/dL    RDW      10.0 - 15.0 %    Platelet Count      150 - 450 10e3/uL    % Neutrophils      %    % Lymphocytes      %    % Monocytes      %    % Eosinophils      %    % Basophils      %    % Immature Granulocytes      %    NRBCs per 100 WBC      <1 /100    Absolute Neutrophils      1.6 - 8.3 10e3/uL    Absolute Lymphocytes      0.8 - 5.3 10e3/uL    Absolute Monocytes      0.0 - 1.3 10e3/uL    Absolute Eosinophils      0.0 - 0.7 10e3/uL    Absolute Basophils      0.0 - 0.2 10e3/uL    Absolute Immature Granulocytes      <=0.4 10e3/uL    Absolute NRBCs      10e3/uL    Sodium      136 - 145 mmol/L 138    Potassium      3.4 - 5.3 mmol/L 4.6    Chloride      98 - 107 mmol/L 102    Carbon Dioxide (CO2)      22 - 29 mmol/L 26    Anion Gap      7 - 15 mmol/L  10    Urea Nitrogen      6.0 - 20.0 mg/dL 17.3    Creatinine      0.67 - 1.17 mg/dL 0.90    Calcium      8.6 - 10.0 mg/dL 9.8    Glucose      70 - 99 mg/dL 111 (H)    Alkaline Phosphatase      40 - 129 U/L 88    AST      10 - 50 U/L 35    ALT      10 - 50 U/L 44    Protein Total      6.4 - 8.3 g/dL 7.6    Albumin      3.5 - 5.2 g/dL 4.3    Bilirubin Total      <=1.2 mg/dL 0.4    GFR Estimate      >60 mL/min/1.73m2 >90    Bilirubin Direct      <=0.5 mg/dL    Albumin      3.5 - 5.0 g/dL       Legend:  (L) Low  (H) High      OUTSIDE RECORDS  Outside referral notes and chart notes were reviewed and pertinent information has been summarized (in addition to the HPI):-        IMPRESSION/REPORT/PLAN  Chronic migraine without aura, intractable, without status migrainosus  Post concussion syndrome  Chiari I malformation (H)  Dysphagia, unspecified type  Medication overuse headache    This is a 53 year old male with history of headaches suggestive of migraine headaches, history of Chiari I malformation, history of TBI and symptoms suggestive of postconcussion syndrome.  Exam today is noncontributory.    His headaches previously are suggestive of migrainous headaches and would not be consistent with a Chiari I malformation as these were more episodic and global..  On imaging MRI study the Chiari I malformation is mild and stable on repeat imaging.    The worsening of headaches after the head injury could be consistent with postconcussion syndrome.  This should not be related to the Chiari I malformation.    He denies any other symptoms related to the Chiari I malformation except for some swallowing difficulty.  This would also be unlikely related to a mild Chiari malformation.  We will set him up with speech therapy for further evaluation.  Could consider surgical treatment if speech therapy thinks that this is coming more from a Chiari malformation.    For asymptomatic Chiari malformation no repeat imaging is needed (per  guidelines).  I can see him back if he has new neurological symptoms.    Continue follow-up with Dr. Montana for postconcussion syndrome/TBI issues.  Follow-up with Dr. Long for his ongoing headaches since the concussion.  He is scheduled for Botox next week.  He is overusing Tylenol which is preventing some of his preventive medications from working.  Recommend cutting down on Tylenol.  Discussed concept of medication overuse headaches.    -     Speech Therapy Referral; Future    Return for Return to PCP or referring provider.    Over 60 minutes were spent coordinating the care for the patient on the day of the encounter.  This includes previsit, during visit and post visit activities as documented above.  Counseling patient.  Reviewing outside notes/chart.  Imaging studies reviewed.  Multiple problems addressed.  (Activities include but not inclusive of reviewing chart, reviewing outside records, reviewing labs and imaging study results as well as the images, patient visit time including getting history and exam,  use if applicable, review of test results with the patient and coming up with a plan in a shared model, counseling patient and family, education and answering patient questions, EMR , EMR diagnosis entry and problem list management, medication reconciliation and prescription management if applicable, paperwork if applicable, printing documents and documentation of the visit activities.)        Jeanmarie Quintanilla MD  Neurologist  [facility] Neurology [facility]  Tel:- [phone]    This note was dictated using voice recognition software.  Any grammatical or context distortions are unintentional and inherent to the software.

## 2023-07-12 NOTE — NURSING NOTE
Chief Complaint   Patient presents with     Headache    Patient states that he has chronic headaches that is believed to be related to a past concussion.   Audrey Morton MA,CMA,10:47 AM

## 2023-07-18 ENCOUNTER — OFFICE VISIT (OUTPATIENT)
Dept: PHYSICAL MEDICINE AND REHAB | Facility: CLINIC | Age: 53
End: 2023-07-18
Payer: COMMERCIAL

## 2023-07-18 VITALS — HEART RATE: 69 BPM | DIASTOLIC BLOOD PRESSURE: 65 MMHG | SYSTOLIC BLOOD PRESSURE: 100 MMHG

## 2023-07-18 DIAGNOSIS — G43.719 CHRONIC MIGRAINE WITHOUT AURA, INTRACTABLE, WITHOUT STATUS MIGRAINOSUS: Primary | ICD-10-CM

## 2023-07-18 DIAGNOSIS — F07.81 POST CONCUSSION SYNDROME: ICD-10-CM

## 2023-07-18 DIAGNOSIS — G47.00 INSOMNIA, UNSPECIFIED TYPE: ICD-10-CM

## 2023-07-18 PROCEDURE — 64615 CHEMODENERV MUSC MIGRAINE: CPT | Performed by: PHYSICAL MEDICINE & REHABILITATION

## 2023-07-18 RX ORDER — AMITRIPTYLINE HYDROCHLORIDE 50 MG/1
50-100 TABLET ORAL AT BEDTIME
Qty: 60 TABLET | Refills: 3 | Status: SHIPPED | OUTPATIENT
Start: 2023-07-18 | End: 2023-11-27

## 2023-07-18 RX ORDER — BUPIVACAINE HYDROCHLORIDE 2.5 MG/ML
3 INJECTION, SOLUTION EPIDURAL; INFILTRATION; INTRACAUDAL ONCE
Status: COMPLETED | OUTPATIENT
Start: 2023-07-18 | End: 2023-07-18

## 2023-07-18 RX ADMIN — BUPIVACAINE HYDROCHLORIDE 7.5 MG: 2.5 INJECTION, SOLUTION EPIDURAL; INFILTRATION; INTRACAUDAL at 14:08

## 2023-07-18 NOTE — LETTER
7/18/2023         RE: Westley Singh  [id number] [address] [address]        Dear Colleague,    Thank you for referring your patient, Westley Singh, to the [facility]. Please see a copy of my visit note below.      [facility]    PM&R CLINIC NOTE  BOTULINUM TOXIN PROCEDURE      HPI  Chief Complaint   Patient presents with     Procedure     Botox     Westley Singh is a 53 year old male with a history of post concussion syndrome and intractable migraine headaches who presents to clinic for botulinum toxin injections for management of chronic migraine headaches.     His headache frequency and severity warrant prevention.  We reviewed his current medications which include topiramate, amitriptyline and propranolol for migraine prevention and rizatriptan for migraine rescue.  None of these have been effective for headache management. He has completely stopped taking Tylenol out of concern for medication overuse headaches. I recommend a trial of botulinum toxin injections every 12 weeks for management of chronic migraine headaches.      SINCE LAST VISIT  Westley Singh was last seen here in clinic on 5/18/2022, at which time he received bilateral occipital nerve blocks. He was evaluated and approved for Botox for management of his chronic migraine headaches.       RESPONSE TO PREVIOUS TREATMENT    N/A - Initial treatment.       PHYSICAL EXAM  VS: /65 (BP Location: Right arm, Patient Position: Sitting)   Pulse 69    GEN: Pleasant and cooperative, in no acute distress  HEENT: No facial asymmetry    ALLERGIES  Allergies   Allergen Reactions     Aspirin      Cephalosporins      Penicillins        CURRENT MEDICATIONS    Current Outpatient Medications:      albuterol (PROAIR HFA/PROVENTIL HFA/VENTOLIN HFA) 108 (90 Base) MCG/ACT inhaler, INHALE TWO PUFFS BY MOUTH EVERY SIX HOURS AS NEEDED for shortness of breath or dypsnea, Disp: 8.5 g, Rfl: 0     amitriptyline  (ELAVIL) 50 MG tablet, TAKE 1 TO 2 TABLETS BY MOUTH AT BEDTIME, Disp: 60 tablet, Rfl: 1     amLODIPine (NORVASC) 5 MG tablet, Take 1 tablet (5 mg) by mouth daily, Disp: 90 tablet, Rfl: 3     buPROPion (WELLBUTRIN XL) 300 MG 24 hr tablet, TAKE 1 TABLET BY MOUTH IN THE MORNING, Disp: 30 tablet, Rfl: 4     methylphenidate (RITALIN) 10 MG tablet, Take 1 tablet (10 mg) by mouth 3 times daily, Disp: 90 tablet, Rfl: 0     mometasone (NASONEX) 50 MCG/ACT nasal spray, , Disp: , Rfl:      propranolol (INDERAL) 80 MG tablet, Take 1 tablet (80 mg) by mouth 3 times daily, Disp: 90 tablet, Rfl: 0     rizatriptan (MAXALT) 10 MG tablet, Take 0.5 tablets (5 mg) by mouth at onset of headache for migraine (May repeat in 2 hours.  Max of 3 Tablets my mouth in 24 hour period), Disp: 10 tablet, Rfl: 3     tadalafil (CIALIS) 10 MG tablet, Take 1 tablet (10 mg) by mouth daily as needed Take 30 minutes prior to sexual activity, no more than once daily., Disp: 12 tablet, Rfl: 11     topiramate (TOPAMAX) 50 MG tablet, Take 1 tablet (50 mg) by mouth 2 times daily, Disp: 60 tablet, Rfl: 3     eszopiclone (LUNESTA) 1 MG tablet, Take 1 tablet (1 mg) by mouth At Bedtime, Disp: 30 tablet, Rfl: 0     lisdexamfetamine (VYVANSE) 70 MG capsule, Take 1 capsule (70 mg) by mouth every morning, Disp: 30 capsule, Rfl: 0     ondansetron (ZOFRAN ODT) 4 MG ODT tab, Take 1 tablet (4 mg) by mouth every 8 hours as needed for nausea or vomiting, Disp: 10 tablet, Rfl: 0     sucralfate (CARAFATE) 1 GM/10ML suspension, Take 10 mLs (1 g) by mouth 4 times daily as needed (Heartburn, nausea), Disp: 414 mL, Rfl: 0    Current Facility-Administered Medications:      botulinum toxin type A (BOTOX) 100 units injection 155 Units, 155 Units, Intramuscular, Q90 Days, Kim Long, MD       BOTULINUM NEUROTOXIN INJECTION PROCEDURES    VERIFICATION OF PATIENT IDENTIFICATION AND PROCEDURE     Initials   Patient Name SES   Patient  SES   Procedure Verified by: ISIS      Prior to the start of the procedure and with procedural staff participation, I verbally confirmed the patient s identity using two indicators, relevant allergies, that the procedure was appropriate and matched the consent or emergent situation, and that the correct equipment/implants were available. Immediately prior to starting the procedure I conducted the Time Out with the procedural staff and re-confirmed the patient s name, procedure, and site/side. (The Joint Commission universal protocol was followed.)  Yes    Sedation (Moderate or Deep): None    ABOVE ASSESSMENTS PERFORMED BY    Kim Long MD      INDICATIONS FOR PROCEDURES  Westley Singh is a 53 year old patient with chronic migraine headaches which have been recalcitrant to oral medications and conservative therapy.  He is here today for injection with Botox.    GOAL OF PROCEDURE  The goal of this procedure is to increase active range of motion, improve volitional motor control and decrease pain .      TOTAL DOSE ADMINISTERED  Dose Administered:  155 units  Botox (Botulinum Toxin Type A)       2:1 Dilution   Unavoidable Drug Waste: Yes  Amount of drug waste (mL): 45 units Botox.  Reason for waste:  Single use vial  Diluent Used:  0.25% bupivacaine  Total Volume of Diluent Used:  3 ml  NDC #: Botox 100u ([address]-68)      CONSENT  The risks, benefits, and treatment options were discussed with Westley Singh and he agreed to proceed.    Written consent was obtained by [facility].     EQUIPMENT USED  Needle-30 gauge    SKIN PREPARATION  Skin preparation was performed using an alcohol wipe.    GUIDANCE DESCRIPTION  Guidance was not utilized for this procedure       AREA/MUSCLE INJECTED: 155 UNITS BOTOX = TOTAL DOSE, 2:1 DILUTION  1. NECK & SHOULDER GIRDLE MUSCLES: 35 UNITS BOTOX = TOTAL DOSE, 2:1 DILUTION  Right Upper Trapezius (upper, mid & low cervical) - 7.5 units of Botox at 3 site/s.   Left Upper Trapezius (upper, mid & low cervical) - 7.5 units of  Botox at 3 site/s.     Right Splenius - 5 units of Botox at 1 site/s.   Left Splenius - 5 units of Botox at 1 site/s.     Right Levator Scapulae - 5 units of Botox at 1 site/s.   Left Levator Scapulae - 5 units of Botox at 1 site/s.    3. FACIAL & SCALP MUSCLES: 120 UNITS BOTOX = TOTAL DOSE, 2:1 DILUTION  Right Frontalis - 10 units of Botox at 2 site/s.  Left Frontalis - 10 units of Botox at 2 site/s.    Right Temporalis - 25 units of Botox at 5 site/s.  Left Temporalis - 25 units of Botox at 5 site/s.    Right Occipitalis - 20 units of Botox at 4 site/s.   Left Occipitalis - 20 units of Botox at 4 site/s.     Right  - 2.5 units of Botox at 1 site/s.   Left  - 2.5 units of Botox at 1 site/s.    Procerus - 5 units of Botox at 1 site/s.       RESPONSE TO PROCEDURE  Westley Singh tolerated the procedure well and there were no immediate complications. He was allowed to recover for an appropriate period of time and was discharged home in stable condition.    ASSESSMENT AND PLAN   1. Botulinum toxin injections: Initial Botox injections administered today for chronic migraine headache. Plan will be to not make any migraine headache medication adjustments at least over the next three months so that it is clear how helpful the Botox is. Patient will continue to monitor response and report at next appointment.   2. Referrals: None.   3. Follow up: Westley Singh was rescheduled for the next series of injections in 12 weeks, at which time we will evaluate response to today's injections. he may call the clinic prior with any questions or concerns prior to the next appointment.     I spent a total of 11 minutes, face-to-face and managing the care of Westley Singh, excluding the procedure. Over 50% of my time was spent counseling the patient and coordinating care. Please see note for details.         Again, thank you for allowing me to participate in the care of your patient.        Sincerely,        Kim URIBE  MD Mercedes

## 2023-07-18 NOTE — PROGRESS NOTES
[facility]    PM&R CLINIC NOTE  BOTULINUM TOXIN PROCEDURE      HPI  Chief Complaint   Patient presents with     Procedure     Botox     Westley Singh is a 53 year old male with a history of post concussion syndrome and intractable migraine headaches who presents to clinic for botulinum toxin injections for management of chronic migraine headaches.     His headache frequency and severity warrant prevention.  We reviewed his current medications which include topiramate, amitriptyline and propranolol for migraine prevention and rizatriptan for migraine rescue.  None of these have been effective for headache management. He has completely stopped taking Tylenol out of concern for medication overuse headaches. I recommend a trial of botulinum toxin injections every 12 weeks for management of chronic migraine headaches.      SINCE LAST VISIT  Westley Singh was last seen here in clinic on 5/18/2022, at which time he received bilateral occipital nerve blocks. He was evaluated and approved for Botox for management of his chronic migraine headaches.       RESPONSE TO PREVIOUS TREATMENT    N/A - Initial treatment.       PHYSICAL EXAM  VS: /65 (BP Location: Right arm, Patient Position: Sitting)   Pulse 69    GEN: Pleasant and cooperative, in no acute distress  HEENT: No facial asymmetry    ALLERGIES  Allergies   Allergen Reactions     Aspirin      Cephalosporins      Penicillins        CURRENT MEDICATIONS    Current Outpatient Medications:      albuterol (PROAIR HFA/PROVENTIL HFA/VENTOLIN HFA) 108 (90 Base) MCG/ACT inhaler, INHALE TWO PUFFS BY MOUTH EVERY SIX HOURS AS NEEDED for shortness of breath or dypsnea, Disp: 8.5 g, Rfl: 0     amitriptyline (ELAVIL) 50 MG tablet, TAKE 1 TO 2 TABLETS BY MOUTH AT BEDTIME, Disp: 60 tablet, Rfl: 1     amLODIPine (NORVASC) 5 MG tablet, Take 1 tablet (5 mg) by mouth daily, Disp: 90 tablet, Rfl: 3     buPROPion (WELLBUTRIN XL) 300 MG 24 hr tablet, TAKE 1 TABLET  BY MOUTH IN THE MORNING, Disp: 30 tablet, Rfl: 4     methylphenidate (RITALIN) 10 MG tablet, Take 1 tablet (10 mg) by mouth 3 times daily, Disp: 90 tablet, Rfl: 0     mometasone (NASONEX) 50 MCG/ACT nasal spray, , Disp: , Rfl:      propranolol (INDERAL) 80 MG tablet, Take 1 tablet (80 mg) by mouth 3 times daily, Disp: 90 tablet, Rfl: 0     rizatriptan (MAXALT) 10 MG tablet, Take 0.5 tablets (5 mg) by mouth at onset of headache for migraine (May repeat in 2 hours.  Max of 3 Tablets my mouth in 24 hour period), Disp: 10 tablet, Rfl: 3     tadalafil (CIALIS) 10 MG tablet, Take 1 tablet (10 mg) by mouth daily as needed Take 30 minutes prior to sexual activity, no more than once daily., Disp: 12 tablet, Rfl: 11     topiramate (TOPAMAX) 50 MG tablet, Take 1 tablet (50 mg) by mouth 2 times daily, Disp: 60 tablet, Rfl: 3     eszopiclone (LUNESTA) 1 MG tablet, Take 1 tablet (1 mg) by mouth At Bedtime, Disp: 30 tablet, Rfl: 0     lisdexamfetamine (VYVANSE) 70 MG capsule, Take 1 capsule (70 mg) by mouth every morning, Disp: 30 capsule, Rfl: 0     ondansetron (ZOFRAN ODT) 4 MG ODT tab, Take 1 tablet (4 mg) by mouth every 8 hours as needed for nausea or vomiting, Disp: 10 tablet, Rfl: 0     sucralfate (CARAFATE) 1 GM/10ML suspension, Take 10 mLs (1 g) by mouth 4 times daily as needed (Heartburn, nausea), Disp: 414 mL, Rfl: 0    Current Facility-Administered Medications:      botulinum toxin type A (BOTOX) 100 units injection 155 Units, 155 Units, Intramuscular, Q90 Days, StandKim rooney MD       BOTULINUM NEUROTOXIN INJECTION PROCEDURES    VERIFICATION OF PATIENT IDENTIFICATION AND PROCEDURE     Initials   Patient Name SES   Patient  SES   Procedure Verified by: SES     Prior to the start of the procedure and with procedural staff participation, I verbally confirmed the patient s identity using two indicators, relevant allergies, that the procedure was appropriate and matched the consent or emergent situation, and  that the correct equipment/implants were available. Immediately prior to starting the procedure I conducted the Time Out with the procedural staff and re-confirmed the patient s name, procedure, and site/side. (The Joint Commission universal protocol was followed.)  Yes    Sedation (Moderate or Deep): None    ABOVE ASSESSMENTS PERFORMED BY    Kim Long MD      INDICATIONS FOR PROCEDURES  Westley Singh is a 53 year old patient with chronic migraine headaches which have been recalcitrant to oral medications and conservative therapy.  He is here today for injection with Botox.    GOAL OF PROCEDURE  The goal of this procedure is to increase active range of motion, improve volitional motor control and decrease pain .      TOTAL DOSE ADMINISTERED  Dose Administered:  155 units  Botox (Botulinum Toxin Type A)       2:1 Dilution   Unavoidable Drug Waste: Yes  Amount of drug waste (mL): 45 units Botox.  Reason for waste:  Single use vial  Diluent Used:  0.25% bupivacaine  Total Volume of Diluent Used:  3 ml  NDC #: Botox 100u ([address]-30)      CONSENT  The risks, benefits, and treatment options were discussed with Westley Singh and he agreed to proceed.    Written consent was obtained by [facility].     EQUIPMENT USED  Needle-30 gauge    SKIN PREPARATION  Skin preparation was performed using an alcohol wipe.    GUIDANCE DESCRIPTION  Guidance was not utilized for this procedure       AREA/MUSCLE INJECTED: 155 UNITS BOTOX = TOTAL DOSE, 2:1 DILUTION  1. NECK & SHOULDER GIRDLE MUSCLES: 35 UNITS BOTOX = TOTAL DOSE, 2:1 DILUTION  Right Upper Trapezius (upper, mid & low cervical) - 7.5 units of Botox at 3 site/s.   Left Upper Trapezius (upper, mid & low cervical) - 7.5 units of Botox at 3 site/s.     Right Splenius - 5 units of Botox at 1 site/s.   Left Splenius - 5 units of Botox at 1 site/s.     Right Levator Scapulae - 5 units of Botox at 1 site/s.   Left Levator Scapulae - 5 units of Botox at 1 site/s.    3. FACIAL & SCALP  MUSCLES: 120 UNITS BOTOX = TOTAL DOSE, 2:1 DILUTION  Right Frontalis - 10 units of Botox at 2 site/s.  Left Frontalis - 10 units of Botox at 2 site/s.    Right Temporalis - 25 units of Botox at 5 site/s.  Left Temporalis - 25 units of Botox at 5 site/s.    Right Occipitalis - 20 units of Botox at 4 site/s.   Left Occipitalis - 20 units of Botox at 4 site/s.     Right  - 2.5 units of Botox at 1 site/s.   Left  - 2.5 units of Botox at 1 site/s.    Procerus - 5 units of Botox at 1 site/s.       RESPONSE TO PROCEDURE  Westley Singh tolerated the procedure well and there were no immediate complications. He was allowed to recover for an appropriate period of time and was discharged home in stable condition.    ASSESSMENT AND PLAN   1. Botulinum toxin injections: Initial Botox injections administered today for chronic migraine headache. Plan will be to not make any migraine headache medication adjustments at least over the next three months so that it is clear how helpful the Botox is. Patient will continue to monitor response and report at next appointment.   2. Referrals: None.   3. Follow up: Westley Singh was rescheduled for the next series of injections in 12 weeks, at which time we will evaluate response to today's injections. he may call the clinic prior with any questions or concerns prior to the next appointment.     I spent a total of 11 minutes, face-to-face and managing the care of Westley Singh, excluding the procedure. Over 50% of my time was spent counseling the patient and coordinating care. Please see note for details.

## 2023-07-18 NOTE — NURSING NOTE
Chief Complaint   Patient presents with     Procedure     [place]loe[place]     Stefani Dominguez MA on 7/18/2023 at 8:41 AM

## 2023-07-31 DIAGNOSIS — J45.30 MILD PERSISTENT ASTHMA WITHOUT COMPLICATION: ICD-10-CM

## 2023-08-02 RX ORDER — ALBUTEROL SULFATE 90 UG/1
AEROSOL, METERED RESPIRATORY (INHALATION)
Qty: 8.5 G | Refills: 0 | Status: SHIPPED | OUTPATIENT
Start: 2023-08-02 | End: 2023-09-06

## 2023-08-02 NOTE — TELEPHONE ENCOUNTER
Prescription approved per [facility] Refill Protocol.  Marlena Diehl RN, BSN  [facility]

## 2023-08-04 DIAGNOSIS — G43.719 CHRONIC MIGRAINE WITHOUT AURA, INTRACTABLE, WITHOUT STATUS MIGRAINOSUS: ICD-10-CM

## 2023-08-04 RX ORDER — PROPRANOLOL HYDROCHLORIDE 80 MG/1
80 TABLET ORAL 3 TIMES DAILY
Qty: 90 TABLET | Refills: 1 | Status: SHIPPED | OUTPATIENT
Start: 2023-08-04 | End: 2023-10-10

## 2023-08-04 NOTE — TELEPHONE ENCOUNTER
"  Last Written Prescription Date:  07/06/2023  Last Fill Quantity: 90,  # refills: 0   Last office visit provider:  03/08/2023     Requested Prescriptions   Pending Prescriptions Disp Refills    propranolol (INDERAL) 80 MG tablet [Pharmacy Med Name: Propranolol HCl Oral Tablet 80 MG] 90 tablet 0     Sig: TAKE ONE TABLET BY MOUTH THREE TIMES DAILY       Beta-Blockers Protocol Passed - 8/4/2023  6:05 PM        Passed - Blood pressure under 140/90 in past 12 months     BP Readings from Last 3 Encounters:   07/18/23 100/65   07/12/23 122/80   06/14/23 (!) 131/91                 Passed - Patient is age 6 or older        Passed - Recent (12 mo) or future (30 days) visit within the authorizing provider's specialty     Patient has had an office visit with the authorizing provider or a provider within the authorizing providers department within the previous 12 mos or has a future within next 30 days. See \"Patient Info\" tab in inbasket, or \"Choose Columns\" in Meds & Orders section of the refill encounter.              Passed - Medication is active on med list             Subha Campos RN 08/04/23 6:05 PM  "

## 2023-08-28 NOTE — TELEPHONE ENCOUNTER
Landry Norman Patient Age: 89 year old  MESSAGE: Interpreting service used: No    Insurance on file confirmed with caller: Yes    Patient calling stating he has an appointment on 10/16 with Dr. Duff at 9:30. Patient states that he was scheduled for tests in the morning at [place] so will not be able to come. Patient has appointment at 1:15 with Dr. Collado in [place]. Requesting if Dr. Duff can see him sometime around then on 10/17. Message routed to team for assistance.     Message read back to caller for accuracy: Yes       ALLERGIES:  Patient has no known allergies.  Current Outpatient Medications   Medication Sig Dispense Refill   • mupirocin (BACTROBAN) 2 % ointment Apply to the affected nostril three times a day for ten days, then as needed when lesions present 30 g 1   • hydroCHLOROthiazide (HYDRODIURIL) 12.5 MG tablet Take 1 tablet by mouth daily. No further refills until seen in office. 90 tablet 3   • metoPROLOL succinate (TOPROL-XL) 25 MG 24 hr tablet Take 1 tablet by mouth daily. 90 tablet 3   • aspirin 81 MG EC tablet Take 1 tablet by mouth daily.     • amoxicillin (AMOXIL) 500 MG tablet Take 4 tablets by mouth 1 time as needed (take 1 hour prior to any dental cleaning or procedure). one hour before dental work 4 tablet 11   • lisinopril (ZESTRIL) 20 MG tablet Take 1 tablet by mouth daily. 90 tablet 3     No current facility-administered medications for this visit.     PHARMACY to use:           Pharmacy preference(s) on file:   [facility] [address] [place] [address] - [address]  [address]  [facility] [address]  Phone: [phone] Fax: [phone]    [facility] - [place] - [facility] AT Portal to Registered [facility]  [place] [address]  Phone: [phone] Fax: [phone]      CALL BACK INFO: Ok to leave response (including medical information) with family member or on answering machine      PCP:  See ov of 4/27/22 regarding toenail fungus.  See also below.  Pt advised via mychart.     Simone Wheat MD         INS: Payor: MEDICARE / Plan: PARTA AND B / Product Type: MEDICARE   PATIENT ADDRESS:  [address] [place] [address] [place] [address]

## 2023-09-02 DIAGNOSIS — J45.30 MILD PERSISTENT ASTHMA WITHOUT COMPLICATION: ICD-10-CM

## 2023-09-06 RX ORDER — ALBUTEROL SULFATE 90 UG/1
AEROSOL, METERED RESPIRATORY (INHALATION)
Qty: 8.5 G | Refills: 0 | Status: SHIPPED | OUTPATIENT
Start: 2023-09-06 | End: 2023-10-24

## 2023-09-06 NOTE — TELEPHONE ENCOUNTER
Prescription approved per [facility] Refill Protocol.    Sanaz TANNER RN, BSN, PHN  [facility]  [phone]

## 2023-09-25 DIAGNOSIS — G43.719 CHRONIC MIGRAINE WITHOUT AURA, INTRACTABLE, WITHOUT STATUS MIGRAINOSUS: ICD-10-CM

## 2023-09-25 RX ORDER — PROPRANOLOL HYDROCHLORIDE 80 MG/1
80 TABLET ORAL 3 TIMES DAILY
Qty: 90 TABLET | Refills: 1 | OUTPATIENT
Start: 2023-09-25

## 2023-10-07 DIAGNOSIS — J45.30 MILD PERSISTENT ASTHMA WITHOUT COMPLICATION: ICD-10-CM

## 2023-10-10 ENCOUNTER — OFFICE VISIT (OUTPATIENT)
Dept: PHYSICAL MEDICINE AND REHAB | Facility: CLINIC | Age: 53
End: 2023-10-10
Payer: COMMERCIAL

## 2023-10-10 VITALS — SYSTOLIC BLOOD PRESSURE: 118 MMHG | DIASTOLIC BLOOD PRESSURE: 90 MMHG | HEART RATE: 120 BPM

## 2023-10-10 DIAGNOSIS — F07.81 POST CONCUSSION SYNDROME: ICD-10-CM

## 2023-10-10 DIAGNOSIS — G43.719 CHRONIC MIGRAINE WITHOUT AURA, INTRACTABLE, WITHOUT STATUS MIGRAINOSUS: ICD-10-CM

## 2023-10-10 DIAGNOSIS — G47.00 INSOMNIA, UNSPECIFIED TYPE: ICD-10-CM

## 2023-10-10 PROCEDURE — 64615 CHEMODENERV MUSC MIGRAINE: CPT | Performed by: PHYSICAL MEDICINE & REHABILITATION

## 2023-10-10 PROCEDURE — 99212 OFFICE O/P EST SF 10 MIN: CPT | Mod: 25 | Performed by: PHYSICAL MEDICINE & REHABILITATION

## 2023-10-10 RX ORDER — PROPRANOLOL HYDROCHLORIDE 80 MG/1
80 TABLET ORAL 3 TIMES DAILY
Qty: 90 TABLET | Refills: 1 | Status: SHIPPED | OUTPATIENT
Start: 2023-10-10 | End: 2023-10-10

## 2023-10-10 RX ORDER — BUPIVACAINE HYDROCHLORIDE 2.5 MG/ML
3 INJECTION, SOLUTION EPIDURAL; INFILTRATION; INTRACAUDAL ONCE
Status: COMPLETED | OUTPATIENT
Start: 2023-10-10 | End: 2023-10-10

## 2023-10-10 RX ADMIN — BUPIVACAINE HYDROCHLORIDE 7.5 MG: 2.5 INJECTION, SOLUTION EPIDURAL; INFILTRATION; INTRACAUDAL at 14:52

## 2023-10-10 NOTE — TELEPHONE ENCOUNTER
Due for PHQ and ACT; please send both to him via [employer].  Can send in refills when we get these back.     Abby Fuentes CNP

## 2023-10-10 NOTE — LETTER
10/10/2023         RE: Westley Singh  [id number] [address] [address]        Dear Colleague,    Thank you for referring your patient, Westley Singh, to the [facility]. Please see a copy of my visit note below.      [facility]    PM&R CLINIC NOTE  BOTULINUM TOXIN PROCEDURE      HPI  Chief Complaint   Patient presents with     Botox     Westley Singh is a 53 year old male with a history of post concussion syndrome and intractable migraine headaches who presents to clinic for botulinum toxin injections for management of chronic migraine headaches.     SINCE LAST VISIT  Westley Singh was last seen here in clinic on 7/18/2023, at which time he received 155 units of Botox. This was his first round of Botox injections.      Patient denies new medical diagnoses, illnesses, hospitalizations, emergency room visits, and injuries since the previous injection with botulinum neurotoxin.       RESPONSE TO PREVIOUS TREATMENT    Side effects: No problems reported.  Post-procedural headache: Yes. He did get a headache that afternoon, which lasted into the next day.     1.  Headache frequency during this injection cycle:  30 headache days per month, but he had at least 50% reduction in headache intensity.  Headache intensity used to be a 6/10 on average, and has decreased to a 3/10 on average. This is compared to his baseline headache frequency of 30 headache days per month.     2.  Headache duration during this injection cycle:  Headache duration ranged from 2 hours to several days. Patient reports one episodes of multiple day headaches during this injection cycle.     3.  Headache intensity during this injection cycle:    A.  3/10  =  Typical pain level.  B.  7/10  =  Worst pain level.  C.  0/10  =  Lowest pain level.    4.  Change in headache medication usage during this injection cycle:  (For Example:  Able to decrease use of oral pain medications.) No changes,  however, his asthma has become worse lately and therefore he will be discontinuing the propranolol.     5.  ER Visits During This Injection Cycle:  None.     6.  Functional Performance:  Change in ADL's, social interaction, days lost from work, etc. Patient reports being able to more fully participate in social and family activities and responsibilities as headache symptoms have improved      PHYSICAL EXAM  VS: BP (!) 118/90 (BP Location: Right arm, Patient Position: Sitting)   Pulse 120    GEN: Pleasant and cooperative, in no acute distress  HEENT: No facial asymmetry    ALLERGIES  Allergies   Allergen Reactions     Aspirin      Cephalosporins      Penicillins        CURRENT MEDICATIONS    Current Outpatient Medications:      albuterol (PROAIR HFA/PROVENTIL HFA/VENTOLIN HFA) 108 (90 Base) MCG/ACT inhaler, INHALE TWO PUFFS BY MOUT EVERY SIX HOURS AS NEEDED FOR SHORNTESS OF BREATH OF DYPSNEA, Disp: 8.5 g, Rfl: 0     amitriptyline (ELAVIL) 50 MG tablet, Take 1-2 tablets ( mg) by mouth At Bedtime, Disp: 60 tablet, Rfl: 3     amLODIPine (NORVASC) 5 MG tablet, Take 1 tablet (5 mg) by mouth daily, Disp: 90 tablet, Rfl: 3     buPROPion (WELLBUTRIN XL) 300 MG 24 hr tablet, TAKE 1 TABLET BY MOUTH IN THE MORNING, Disp: 30 tablet, Rfl: 4     methylphenidate (RITALIN) 10 MG tablet, Take 1 tablet (10 mg) by mouth 3 times daily, Disp: 90 tablet, Rfl: 0     mometasone (NASONEX) 50 MCG/ACT nasal spray, , Disp: , Rfl:      propranolol (INDERAL) 80 MG tablet, Take 1 tablet (80 mg) by mouth 3 times daily, Disp: 90 tablet, Rfl: 1     rizatriptan (MAXALT) 10 MG tablet, Take 0.5 tablets (5 mg) by mouth at onset of headache for migraine (May repeat in 2 hours.  Max of 3 Tablets my mouth in 24 hour period), Disp: 10 tablet, Rfl: 3     tadalafil (CIALIS) 10 MG tablet, Take 1 tablet (10 mg) by mouth daily as needed Take 30 minutes prior to sexual activity, no more than once daily., Disp: 12 tablet, Rfl: 11     topiramate (TOPAMAX) 50  MG tablet, Take 1 tablet (50 mg) by mouth 2 times daily, Disp: 60 tablet, Rfl: 3    Current Facility-Administered Medications:      botulinum toxin type A (BOTOX) 100 units injection 155 Units, 155 Units, Intramuscular, Q90 Days, Kim Long MD, 155 Units at 23 1408       BOTULINUM NEUROTOXIN INJECTION PROCEDURES    VERIFICATION OF PATIENT IDENTIFICATION AND PROCEDURE     Initials   Patient Name SES   Patient  SES   Procedure Verified by: SES     Prior to the start of the procedure and with procedural staff participation, I verbally confirmed the patient s identity using two indicators, relevant allergies, that the procedure was appropriate and matched the consent or emergent situation, and that the correct equipment/implants were available. Immediately prior to starting the procedure I conducted the Time Out with the procedural staff and re-confirmed the patient s name, procedure, and site/side. (The Joint Commission universal protocol was followed.)  Yes    Sedation (Moderate or Deep): None    ABOVE ASSESSMENTS PERFORMED BY    Kim Long MD      INDICATIONS FOR PROCEDURES  Westley Singh is a 53 year old patient with chronic migraine headaches which have been recalcitrant to oral medications and conservative therapy.  He is here today for injection with Botox.    GOAL OF PROCEDURE  The goal of this procedure is to increase active range of motion, improve volitional motor control and decrease pain .      TOTAL DOSE ADMINISTERED  Dose Administered:  155 units  Botox (Botulinum Toxin Type A)       2:1 Dilution   Unavoidable Drug Waste: Yes  Amount of drug waste (mL): 45 units Botox.  Reason for waste:  Single use vial  Diluent Used:  0.25% bupivacaine  Total Volume of Diluent Used:  3 ml  NDC #: Botox 100u ([address]-52)      CONSENT  The risks, benefits, and treatment options were discussed with Westley Singh and he agreed to proceed.    Written consent was obtained by [facility].     EQUIPMENT  USED  Needle-30 gauge    SKIN PREPARATION  Skin preparation was performed using an alcohol wipe.    GUIDANCE DESCRIPTION  Guidance was not utilized for this procedure       AREA/MUSCLE INJECTED: 155 UNITS BOTOX = TOTAL DOSE, 2:1 DILUTION  1. NECK & SHOULDER GIRDLE MUSCLES: 35 UNITS BOTOX = TOTAL DOSE, 2:1 DILUTION  Right Upper Trapezius (upper, mid & low cervical) - 7.5 units of Botox at 3 site/s.   Left Upper Trapezius (upper, mid & low cervical) - 7.5 units of Botox at 3 site/s.     Right Splenius - 5 units of Botox at 1 site/s.   Left Splenius - 5 units of Botox at 1 site/s.     Right Levator Scapulae - 5 units of Botox at 1 site/s.   Left Levator Scapulae - 5 units of Botox at 1 site/s.    3. FACIAL & SCALP MUSCLES: 120 UNITS BOTOX = TOTAL DOSE, 2:1 DILUTION  Right Frontalis - 10 units of Botox at 2 site/s.  Left Frontalis - 10 units of Botox at 2 site/s.    Right Temporalis - 25 units of Botox at 5 site/s.  Left Temporalis - 25 units of Botox at 5 site/s.    Right Occipitalis - 20 units of Botox at 4 site/s.   Left Occipitalis - 20 units of Botox at 4 site/s.     Right  - 2.5 units of Botox at 1 site/s.   Left  - 2.5 units of Botox at 1 site/s.    Procerus - 5 units of Botox at 1 site/s.       RESPONSE TO PROCEDURE  Westley Singh tolerated the procedure well and there were no immediate complications. He was allowed to recover for an appropriate period of time and was discharged home in stable condition.    ASSESSMENT AND PLAN   Botulinum toxin injections: No changes made to Botox dose or distribution today. Patient will continue to monitor response to Botox and report at next appointment.    Medications: Propranolol discontinued today due to worsening of his asthma more recently.   Referrals: Headache referral placed today for recommendations regarding optimization of medication management of chronic migraine headaches.    Follow up: Westley Singh was rescheduled for the next series of  injections in 12 weeks, at which time we will evaluate response to today's injections. he may call the clinic prior with any questions or concerns prior to the next appointment.       I spent a total of 12 minutes, face-to-face and managing the care of Westley Singh, excluding the procedure. Over 50% of my time was spent counseling the patient and coordinating care. Please see note for details.       Again, thank you for allowing me to participate in the care of your patient.        Sincerely,        Kim Long MD

## 2023-10-10 NOTE — PROGRESS NOTES
[facility]    PM&R CLINIC NOTE  BOTULINUM TOXIN PROCEDURE      HPI  Chief Complaint   Patient presents with    Botox     Westley Singh is a 53 year old male with a history of post concussion syndrome and intractable migraine headaches who presents to clinic for botulinum toxin injections for management of chronic migraine headaches.     SINCE LAST VISIT  Westley Singh was last seen here in clinic on 7/18/2023, at which time he received 155 units of Botox. This was his first round of Botox injections.      Patient denies new medical diagnoses, illnesses, hospitalizations, emergency room visits, and injuries since the previous injection with botulinum neurotoxin.       RESPONSE TO PREVIOUS TREATMENT    Side effects: No problems reported.  Post-procedural headache: Yes. He did get a headache that afternoon, which lasted into the next day.     1.  Headache frequency during this injection cycle:  30 headache days per month, but he had at least 50% reduction in headache intensity.  Headache intensity used to be a 6/10 on average, and has decreased to a 3/10 on average. This is compared to his baseline headache frequency of 30 headache days per month.     2.  Headache duration during this injection cycle:  Headache duration ranged from 2 hours to several days. Patient reports one episodes of multiple day headaches during this injection cycle.     3.  Headache intensity during this injection cycle:    A.  3/10  =  Typical pain level.  B.  7/10  =  Worst pain level.  C.  0/10  =  Lowest pain level.    4.  Change in headache medication usage during this injection cycle:  (For Example:  Able to decrease use of oral pain medications.) No changes, however, his asthma has become worse lately and therefore he will be discontinuing the propranolol.     5.  ER Visits During This Injection Cycle:  None.     6.  Functional Performance:  Change in ADL's, social interaction, days lost from work, etc. Patient  reports being able to more fully participate in social and family activities and responsibilities as headache symptoms have improved      PHYSICAL EXAM  VS: BP (!) 118/90 (BP Location: Right arm, Patient Position: Sitting)   Pulse 120    GEN: Pleasant and cooperative, in no acute distress  HEENT: No facial asymmetry    ALLERGIES  Allergies   Allergen Reactions    Aspirin     Cephalosporins     Penicillins        CURRENT MEDICATIONS    Current Outpatient Medications:     albuterol (PROAIR HFA/PROVENTIL HFA/VENTOLIN HFA) 108 (90 Base) MCG/ACT inhaler, INHALE TWO PUFFS BY MOUT EVERY SIX HOURS AS NEEDED FOR SHORNTESS OF BREATH OF DYPSNEA, Disp: 8.5 g, Rfl: 0    amitriptyline (ELAVIL) 50 MG tablet, Take 1-2 tablets ( mg) by mouth At Bedtime, Disp: 60 tablet, Rfl: 3    amLODIPine (NORVASC) 5 MG tablet, Take 1 tablet (5 mg) by mouth daily, Disp: 90 tablet, Rfl: 3    buPROPion (WELLBUTRIN XL) 300 MG 24 hr tablet, TAKE 1 TABLET BY MOUTH IN THE MORNING, Disp: 30 tablet, Rfl: 4    methylphenidate (RITALIN) 10 MG tablet, Take 1 tablet (10 mg) by mouth 3 times daily, Disp: 90 tablet, Rfl: 0    mometasone (NASONEX) 50 MCG/ACT nasal spray, , Disp: , Rfl:     propranolol (INDERAL) 80 MG tablet, Take 1 tablet (80 mg) by mouth 3 times daily, Disp: 90 tablet, Rfl: 1    rizatriptan (MAXALT) 10 MG tablet, Take 0.5 tablets (5 mg) by mouth at onset of headache for migraine (May repeat in 2 hours.  Max of 3 Tablets my mouth in 24 hour period), Disp: 10 tablet, Rfl: 3    tadalafil (CIALIS) 10 MG tablet, Take 1 tablet (10 mg) by mouth daily as needed Take 30 minutes prior to sexual activity, no more than once daily., Disp: 12 tablet, Rfl: 11    topiramate (TOPAMAX) 50 MG tablet, Take 1 tablet (50 mg) by mouth 2 times daily, Disp: 60 tablet, Rfl: 3    Current Facility-Administered Medications:     botulinum toxin type A (BOTOX) 100 units injection 155 Units, 155 Units, Intramuscular, Q90 Days, Standal, Kim Johnson MD, 155 Units at  23 1408       BOTULINUM NEUROTOXIN INJECTION PROCEDURES    VERIFICATION OF PATIENT IDENTIFICATION AND PROCEDURE     Initials   Patient Name SES   Patient  SES   Procedure Verified by: ISIS     Prior to the start of the procedure and with procedural staff participation, I verbally confirmed the patient s identity using two indicators, relevant allergies, that the procedure was appropriate and matched the consent or emergent situation, and that the correct equipment/implants were available. Immediately prior to starting the procedure I conducted the Time Out with the procedural staff and re-confirmed the patient s name, procedure, and site/side. (The Joint Commission universal protocol was followed.)  Yes    Sedation (Moderate or Deep): None    ABOVE ASSESSMENTS PERFORMED BY    Kim Long MD      INDICATIONS FOR PROCEDURES  Westley Singh is a 53 year old patient with chronic migraine headaches which have been recalcitrant to oral medications and conservative therapy.  He is here today for injection with Botox.    GOAL OF PROCEDURE  The goal of this procedure is to increase active range of motion, improve volitional motor control and decrease pain .      TOTAL DOSE ADMINISTERED  Dose Administered:  155 units  Botox (Botulinum Toxin Type A)       2:1 Dilution   Unavoidable Drug Waste: Yes  Amount of drug waste (mL): 45 units Botox.  Reason for waste:  Single use vial  Diluent Used:  0.25% bupivacaine  Total Volume of Diluent Used:  3 ml  NDC #: Botox 100u ([address]-11)      CONSENT  The risks, benefits, and treatment options were discussed with Westley Singh and he agreed to proceed.    Written consent was obtained by [facility]     EQUIPMENT USED  Needle-30 gauge    SKIN PREPARATION  Skin preparation was performed using an alcohol wipe.    GUIDANCE DESCRIPTION  Guidance was not utilized for this procedure       AREA/MUSCLE INJECTED: 155 UNITS BOTOX = TOTAL DOSE, 2:1 DILUTION  1. NECK & SHOULDER GIRDLE  MUSCLES: 35 UNITS BOTOX = TOTAL DOSE, 2:1 DILUTION  Right Upper Trapezius (upper, mid & low cervical) - 7.5 units of Botox at 3 site/s.   Left Upper Trapezius (upper, mid & low cervical) - 7.5 units of Botox at 3 site/s.     Right Splenius - 5 units of Botox at 1 site/s.   Left Splenius - 5 units of Botox at 1 site/s.     Right Levator Scapulae - 5 units of Botox at 1 site/s.   Left Levator Scapulae - 5 units of Botox at 1 site/s.    3. FACIAL & SCALP MUSCLES: 120 UNITS BOTOX = TOTAL DOSE, 2:1 DILUTION  Right Frontalis - 10 units of Botox at 2 site/s.  Left Frontalis - 10 units of Botox at 2 site/s.    Right Temporalis - 25 units of Botox at 5 site/s.  Left Temporalis - 25 units of Botox at 5 site/s.    Right Occipitalis - 20 units of Botox at 4 site/s.   Left Occipitalis - 20 units of Botox at 4 site/s.     Right  - 2.5 units of Botox at 1 site/s.   Left  - 2.5 units of Botox at 1 site/s.    Procerus - 5 units of Botox at 1 site/s.       RESPONSE TO PROCEDURE  Westley Singh tolerated the procedure well and there were no immediate complications. He was allowed to recover for an appropriate period of time and was discharged home in stable condition.    ASSESSMENT AND PLAN   Botulinum toxin injections: No changes made to Botox dose or distribution today. Patient will continue to monitor response to Botox and report at next appointment.    Medications: Propranolol discontinued today due to worsening of his asthma more recently.   Referrals: Headache referral placed today for recommendations regarding optimization of medication management of chronic migraine headaches.    Follow up: Westley Singh was rescheduled for the next series of injections in 12 weeks, at which time we will evaluate response to today's injections. he may call the clinic prior with any questions or concerns prior to the next appointment.       I spent a total of 12 minutes, face-to-face and managing the care of Westley Singh,  excluding the procedure. Over 50% of my time was spent counseling the patient and coordinating care. Please see note for details.

## 2023-10-10 NOTE — TELEPHONE ENCOUNTER
Routing refill request to provider for review/approval because:  Drug not on the [facility] refill protocol       10/25/2021     1:35 PM 4/27/2022    11:12 AM 3/8/2023     7:13 AM   ACT Total Scores   ACT TOTAL SCORE (Goal Greater than or Equal to 20) 21 23 22   In the past 12 months, how many times did you visit the emergency room for your asthma without being admitted to the hospital? 0 0 0   In the past 12 months, how many times were you hospitalized overnight because of your asthma? 0 0 0

## 2023-10-24 DIAGNOSIS — J45.30 MILD PERSISTENT ASTHMA WITHOUT COMPLICATION: ICD-10-CM

## 2023-10-24 RX ORDER — ALBUTEROL SULFATE 90 UG/1
AEROSOL, METERED RESPIRATORY (INHALATION)
Qty: 8.5 G | Refills: 0 | Status: SHIPPED | OUTPATIENT
Start: 2023-10-24 | End: 2023-11-27

## 2023-10-27 RX ORDER — ALBUTEROL SULFATE 90 UG/1
AEROSOL, METERED RESPIRATORY (INHALATION)
Qty: 8.5 G | Refills: 0 | OUTPATIENT
Start: 2023-10-27

## 2023-10-30 NOTE — TELEPHONE ENCOUNTER
Routing refill request to provider for review/approval because:  Drug not on the [facility] refill protocol     See  message encounter.     Myra RAYMUNDO RN           blood pressure fairly well controlled on lisinopril 20 mg once a day, no longer having dizzy spells after cutting the dose in half, she also remains on hydrochlorothiazide, she is complaining of cough which is most likely secondary to ACE inhibitor therapy however at this point she does not want to change the medication until she is done with her current supply.   She will continue ambulatory blood pressure monitoring and will reevaluate in 2 months

## 2023-11-27 ENCOUNTER — MYC REFILL (OUTPATIENT)
Dept: FAMILY MEDICINE | Facility: CLINIC | Age: 53
End: 2023-11-27
Payer: COMMERCIAL

## 2023-11-27 ENCOUNTER — MYC REFILL (OUTPATIENT)
Dept: PHYSICAL MEDICINE AND REHAB | Facility: CLINIC | Age: 53
End: 2023-11-27
Payer: COMMERCIAL

## 2023-11-27 ENCOUNTER — MYC MEDICAL ADVICE (OUTPATIENT)
Dept: PHYSICAL MEDICINE AND REHAB | Facility: CLINIC | Age: 53
End: 2023-11-27
Payer: COMMERCIAL

## 2023-11-27 DIAGNOSIS — G43.719 CHRONIC MIGRAINE WITHOUT AURA, INTRACTABLE, WITHOUT STATUS MIGRAINOSUS: ICD-10-CM

## 2023-11-27 DIAGNOSIS — G47.00 INSOMNIA, UNSPECIFIED TYPE: ICD-10-CM

## 2023-11-27 DIAGNOSIS — J45.30 MILD PERSISTENT ASTHMA WITHOUT COMPLICATION: ICD-10-CM

## 2023-11-27 DIAGNOSIS — F07.81 POST CONCUSSION SYNDROME: ICD-10-CM

## 2023-11-27 RX ORDER — ALBUTEROL SULFATE 90 UG/1
AEROSOL, METERED RESPIRATORY (INHALATION)
Qty: 8.5 G | Refills: 0 | Status: SHIPPED | OUTPATIENT
Start: 2023-11-27

## 2023-11-27 RX ORDER — AMITRIPTYLINE HYDROCHLORIDE 50 MG/1
50-100 TABLET ORAL AT BEDTIME
Qty: 60 TABLET | Refills: 3 | Status: SHIPPED | OUTPATIENT
Start: 2023-11-27 | End: 2024-04-05

## 2023-11-27 RX ORDER — TOPIRAMATE 50 MG/1
50 TABLET, FILM COATED ORAL 2 TIMES DAILY
Qty: 60 TABLET | Refills: 3 | Status: SHIPPED | OUTPATIENT
Start: 2023-11-27 | End: 2024-04-11

## 2023-11-27 NOTE — TELEPHONE ENCOUNTER
Refill request received from patient via [employer] refill request    Medication: amitriptyline (ELAVIL) 50 MG tablet   Directions: Take 1-2 tablets ( mg) by mouth At Bedtime   Last ordered: 7/18/23   Qty: #60  RF: 3    Medication: topiramate (TOPAMAX) 50 MG tablet   Directions: Take 1 tablet (50 mg) by mouth 2 times daily   Last ordered: 5/18/23   Qty: #60  RF: 3    Last OV: 10/10/23  Next OV: 1/4/24    Routing to Dr. Long to review and sign if appropriate.    Violet Grimes, RN Care Coordinator  [facility]   [facility] main phone # [phone] fax # [phone]

## 2023-11-28 DIAGNOSIS — G43.711 INTRACTABLE CHRONIC MIGRAINE WITHOUT AURA AND WITH STATUS MIGRAINOSUS: Primary | ICD-10-CM

## 2023-11-28 RX ORDER — METHYLPREDNISOLONE 4 MG
TABLET, DOSE PACK ORAL
Qty: 21 TABLET | Refills: 0 | Status: SHIPPED | OUTPATIENT
Start: 2023-11-28 | End: 2023-12-27

## 2023-11-29 ENCOUNTER — VIRTUAL VISIT (OUTPATIENT)
Dept: FAMILY MEDICINE | Facility: CLINIC | Age: 53
End: 2023-11-29
Payer: COMMERCIAL

## 2023-11-29 DIAGNOSIS — J45.30 MILD PERSISTENT ASTHMA WITHOUT COMPLICATION: Primary | ICD-10-CM

## 2023-11-29 PROCEDURE — 99214 OFFICE O/P EST MOD 30 MIN: CPT | Mod: VID | Performed by: NURSE PRACTITIONER

## 2023-11-29 RX ORDER — FLUTICASONE PROPIONATE AND SALMETEROL 250; 50 UG/1; UG/1
1 POWDER RESPIRATORY (INHALATION) EVERY 12 HOURS
Qty: 60 EACH | Refills: 1 | Status: SHIPPED | OUTPATIENT
Start: 2023-11-29 | End: 2023-12-27

## 2023-11-29 ASSESSMENT — ASTHMA QUESTIONNAIRES: ACT_TOTALSCORE: 10

## 2023-11-29 NOTE — PROGRESS NOTES
Westley is a 53 year old who is being evaluated via a billable video visit.      How would you like to obtain your AVS? MyChart  If the video visit is dropped, the invitation should be resent by: Text to cell phone: [phone]  Will anyone else be joining your video visit? No          Assessment & Plan       Mild persistent asthma without complication  Chronic, uncontrolled.  Starting advair, continue albuterol as needed.  On steroid dose pack for migraines (PM&R clinic).  Follow-up in 1 month in clinic, sooner if worsening symptoms.   - fluticasone-salmeterol (ADVAIR) 250-50 MCG/ACT inhaler; Inhale 1 puff into the lungs every 12 hours             Abby Fuentes, RUI FAIR  [facility]    Subjective   Westley is a 53 year old, presenting for the following health issues:  Asthma        11/29/2023     1:28 PM   Additional Questions   Roomed by Parisa GARZA MA   Accompanied by self         11/29/2023     1:28 PM   Patient Reported Additional Medications   Patient reports taking the following new medications none       HPI     Concern - asthma  Onset: 3 months   Description: asthma symptoms have increased, for the last 3 months    Intensity: moderate, severe  Progression of Symptoms:  worsening  Accompanying Signs & Symptoms: shortness of breath, wheezing and cough   Previous history of similar problem: no  Precipitating factors:        Worsened by: the cold weather   Alleviating factors:        Improved by: nothing just inhaler   Therapies tried and outcome: rescue inhaler     Had been on propranolol for migraines, but was discontinued last month due to worsening asthma symptoms.   Dry cough/tight chest, wheezing intermittently.  Using albuterol every 4-6 hours with relief.  Hasn't had an asthma issue in many years.   URI symptoms the last week, but doesn't seem like that has flared asthma.  Covid test was negative.   Started steroid pack today for migraines.  No new pets, hasn't moved.   Unsure what  flared up the asthma.        Review of Systems   Constitutional, HEENT, cardiovascular, pulmonary, gi and gu systems are negative, except as otherwise noted.      Objective           Vitals:  No vitals were obtained today due to virtual visit.    Physical Exam   GENERAL: Healthy, alert and no distress  EYES: Eyes grossly normal to inspection.  No discharge or erythema, or obvious scleral/conjunctival abnormalities.  RESP: No audible wheeze, cough, or visible cyanosis.  No visible retractions or increased work of breathing.    SKIN: Visible skin clear. No significant rash, abnormal pigmentation or lesions.  NEURO: Cranial nerves grossly intact.  Mentation and speech appropriate for age.  PSYCH: Mentation appears normal, affect normal/bright, judgement and insight intact, normal speech and appearance well-groomed.    No results found for this visit on 11/29/23.            Video-Visit Details    Type of service:  Video Visit     Originating Location (pt. Location): Home    Distant Location (provider location):  Off-site  Platform used for Video Visit: [employer]

## 2023-12-12 DIAGNOSIS — G43.719 CHRONIC MIGRAINE WITHOUT AURA, INTRACTABLE, WITHOUT STATUS MIGRAINOSUS: ICD-10-CM

## 2023-12-14 RX ORDER — RIZATRIPTAN BENZOATE 10 MG/1
TABLET ORAL
Qty: 10 TABLET | Refills: 0 | Status: SHIPPED | OUTPATIENT
Start: 2023-12-14 | End: 2024-01-17

## 2023-12-27 ENCOUNTER — OFFICE VISIT (OUTPATIENT)
Dept: FAMILY MEDICINE | Facility: CLINIC | Age: 53
End: 2023-12-27
Payer: COMMERCIAL

## 2023-12-27 VITALS
DIASTOLIC BLOOD PRESSURE: 70 MMHG | TEMPERATURE: 97.8 F | WEIGHT: 231.8 LBS | SYSTOLIC BLOOD PRESSURE: 120 MMHG | HEART RATE: 82 BPM | OXYGEN SATURATION: 100 % | RESPIRATION RATE: 16 BRPM | BODY MASS INDEX: 31.4 KG/M2 | HEIGHT: 72 IN

## 2023-12-27 DIAGNOSIS — J45.30 MILD PERSISTENT ASTHMA WITHOUT COMPLICATION: Primary | ICD-10-CM

## 2023-12-27 DIAGNOSIS — Z23 HIGH PRIORITY FOR 2019-NCOV VACCINE: ICD-10-CM

## 2023-12-27 PROCEDURE — 99213 OFFICE O/P EST LOW 20 MIN: CPT | Performed by: NURSE PRACTITIONER

## 2023-12-27 PROCEDURE — 90480 ADMN SARSCOV2 VAC 1/ONLY CMP: CPT | Performed by: NURSE PRACTITIONER

## 2023-12-27 PROCEDURE — 91320 SARSCV2 VAC 30MCG TRS-SUC IM: CPT | Performed by: NURSE PRACTITIONER

## 2023-12-27 RX ORDER — FLUTICASONE PROPIONATE AND SALMETEROL 250; 50 UG/1; UG/1
1 POWDER RESPIRATORY (INHALATION) EVERY 12 HOURS
Qty: 60 EACH | Refills: 2 | Status: SHIPPED | OUTPATIENT
Start: 2023-12-27 | End: 2024-04-11

## 2023-12-27 ASSESSMENT — PAIN SCALES - GENERAL: PAINLEVEL: NO PAIN (0)

## 2023-12-27 ASSESSMENT — ASTHMA QUESTIONNAIRES: ACT_TOTALSCORE: 15

## 2023-12-27 NOTE — PROGRESS NOTES
Assessment & Plan     Mild persistent asthma without complication  Chronic, improving with addition of advair.  Will have him continue.  Will send ACT to update in 4-6 weeks.  Follow-up 2-3 mos.  - fluticasone-salmeterol (ADVAIR) 250-50 MCG/ACT inhaler; Inhale 1 puff into the lungs every 12 hours    High priority for 2019-nCoV vaccine  administered               Abby Fuentes, RUI CNP  [facility] AJITH Christy is a 53 year old, presenting for the following health issues:  Asthma Recheck (Follow up) and Imm/Inj (COVID-19 VACCINE)        12/27/2023    10:53 AM   Additional Questions   Roomed by Parisa GARZA MA   Accompanied by self         12/27/2023    10:53 AM   Patient Reported Additional Medications   Patient reports taking the following new medications none       History of Present Illness     Asthma:  He presents for follow up of asthma.  He has no cough, no wheezing, and some shortness of breath.  He is using a relief medication a few times a week. He does not miss any doses of his controller medication throughout the week. Patient is aware of the following triggers: same as previous visit, animal dander, cold air, dust mites, exercise or sports, humidity, mold, pollens, strong odors and fumes and upper respiratory infections. The patient has not had a visit to the Emergency Room, Urgent Care or Hospital due to asthma since the last clinic visit.     He eats 4 or more servings of fruits and vegetables daily.He consumes 0 sweetened beverage(s) daily.He exercises with enough effort to increase his heart rate 10 to 19 minutes per day.  He exercises with enough effort to increase his heart rate 3 or less days per week.   He is taking medications regularly.         Asthma Follow-Up    Was ACT completed today?  Yes        12/27/2023    11:54 AM   ACT Total Scores   ACT TOTAL SCORE (Goal Greater than or Equal to 20) 15   In the past 12 months, how many times did you visit the emergency  room for your asthma without being admitted to the hospital? 0   In the past 12 months, how many times were you hospitalized overnight because of your asthma? 0       How many days per week do you miss taking your asthma controller medication?  0  Please describe any recent triggers for your asthma: dust mites, pollens, animal dander, humidity, exercise or sports, and cold air  Have you had any Emergency Room Visits, Urgent Care Visits, or Hospital Admissions since your last office visit?  No      11/27/2023     9:57 AM 11/29/2023     1:32 PM 12/27/2023    11:54 AM   ACT Total Scores   ACT TOTAL SCORE (Goal Greater than or Equal to 20) 9 10 15   In the past 12 months, how many times did you visit the emergency room for your asthma without being admitted to the hospital? 0 0 0   In the past 12 months, how many times were you hospitalized overnight because of your asthma? 0 0 0     Asthma has been a lot better since starting on advair last month.   Still using albuterol, maybe once a day rather than every 4 hours.           Review of Systems   Constitutional, HEENT, cardiovascular, pulmonary, gi and gu systems are negative, except as otherwise noted.      Objective    /70 (BP Location: Right arm, Patient Position: Sitting, Cuff Size: Adult Large)   Pulse 82   Temp 97.8  F (36.6  C) (Oral)   Resp 16   Ht 1.829 m (6')   Wt 105.1 kg (231 lb 12.8 oz)   SpO2 100%   BMI 31.44 kg/m    Body mass index is 31.44 kg/m .  Physical Exam   GENERAL: healthy, alert and no distress  RESP: lungs clear to auscultation - no rales, rhonchi or wheezes  CV: regular rate and rhythm, normal S1 S2, no S3 or S4, no murmur, click or rub, no peripheral edema and peripheral pulses strong    No results found for this or any previous visit (from the past 24 hour(s)).

## 2023-12-28 ENCOUNTER — TELEPHONE (OUTPATIENT)
Dept: NEUROLOGY | Facility: CLINIC | Age: 53
End: 2023-12-28
Payer: COMMERCIAL

## 2023-12-28 NOTE — TELEPHONE ENCOUNTER
Spoke with pt to let him know that his insurance policy needs to be re authorized in the beginning of the year. And his insurance is still pending.     Pt verbalized understanding and have no questions.    Stefani Dominguez MA on 12/28/2023 at 10:38 AM

## 2023-12-29 ENCOUNTER — TELEPHONE (OUTPATIENT)
Dept: FAMILY MEDICINE | Facility: CLINIC | Age: 53
End: 2023-12-29
Payer: COMMERCIAL

## 2023-12-29 NOTE — TELEPHONE ENCOUNTER
Patient dropped off form 12/28 for completion.  Patient has not completed a Lipid panel since 4/2022- could not complete that portion of form, patient is aware. Form was faxed as it needed to be received by 12/31/23.    Form in Abby's file at [facility] desk.

## 2024-01-04 ENCOUNTER — OFFICE VISIT (OUTPATIENT)
Dept: PHYSICAL MEDICINE AND REHAB | Facility: CLINIC | Age: 54
End: 2024-01-04
Payer: COMMERCIAL

## 2024-01-04 VITALS — HEART RATE: 89 BPM | DIASTOLIC BLOOD PRESSURE: 84 MMHG | SYSTOLIC BLOOD PRESSURE: 117 MMHG

## 2024-01-04 DIAGNOSIS — G43.719 CHRONIC MIGRAINE WITHOUT AURA, INTRACTABLE, WITHOUT STATUS MIGRAINOSUS: Primary | ICD-10-CM

## 2024-01-04 PROCEDURE — 64615 CHEMODENERV MUSC MIGRAINE: CPT | Performed by: PHYSICAL MEDICINE & REHABILITATION

## 2024-01-04 NOTE — PROGRESS NOTES
[facility]    PM&R CLINIC NOTE  BOTULINUM TOXIN PROCEDURE      HPI  Chief Complaint   Patient presents with    Procedure     Botox     Westley Singh is a 53 year old male with a history of post concussion syndrome and intractable migraine headaches who presents to clinic for botulinum toxin injections for management of chronic migraine headaches.     SINCE LAST VISIT  Westley Singh was last seen here in clinic on 10/10/2023, at which time he received 155 units of Botox.     Patient denies new medical diagnoses, illnesses, hospitalizations, emergency room visits, and injuries since the previous injection with botulinum neurotoxin.     He reports that overall, response to Botox was seemingly less effective this round, as he did seem to have more headaches which were debilitating, necessitating the use of a medrol dosepak, which did seem to work quite well to bring his headaches down to a more tolerable level.      RESPONSE TO PREVIOUS TREATMENT    Side effects: No problems reported.  Post-procedural headache: Yes. He did get a headache that afternoon, which lasted into the next day.     1.  Headache frequency during this injection cycle:  30 headache days per month, but he had at least 50% reduction in headache intensity.  Headache intensity used to be a 6/10 on average, and has decreased to a 3/10 on average. This is compared to his baseline headache frequency of 30 headache days per month.     2.  Headache duration during this injection cycle:  Headache duration ranged from 2 hours to several days. Patient reports one episodes of multiple day headaches during this injection cycle.     3.  Headache intensity during this injection cycle:    A.  3/10  =  Typical pain level.  B.  7/10  =  Worst pain level.  C.  0/10  =  Lowest pain level.    4.  Change in headache medication usage during this injection cycle:  (For Example:  Able to decrease use of oral pain medications.) No changes, although  he is currently taking Tylenol every day, and is taking less than 4000 mg daily.     5.  ER Visits During This Injection Cycle:  None.     6.  Functional Performance:  Change in ADL's, social interaction, days lost from work, etc. Patient reports being able to more fully participate in social and family activities and responsibilities as headache symptoms have improved      PHYSICAL EXAM  VS: /84 (BP Location: Right arm, Patient Position: Sitting)   Pulse 89    GEN: Pleasant and cooperative, in no acute distress  HEENT: No facial asymmetry    ALLERGIES  Allergies   Allergen Reactions    Aspirin     Cephalosporins     Penicillins        CURRENT MEDICATIONS    Current Outpatient Medications:     albuterol (PROAIR HFA/PROVENTIL HFA/VENTOLIN HFA) 108 (90 Base) MCG/ACT inhaler, INHALE TWO PUFFS BY MOUT EVERY SIX HOURS AS NEEDED FOR SHORNTESS OF BREATH OF DYPSNEA, Disp: 8.5 g, Rfl: 0    amitriptyline (ELAVIL) 50 MG tablet, Take 1-2 tablets ( mg) by mouth at bedtime, Disp: 60 tablet, Rfl: 3    amLODIPine (NORVASC) 5 MG tablet, Take 1 tablet (5 mg) by mouth daily, Disp: 90 tablet, Rfl: 3    fluticasone-salmeterol (ADVAIR) 250-50 MCG/ACT inhaler, Inhale 1 puff into the lungs every 12 hours, Disp: 60 each, Rfl: 2    mometasone (NASONEX) 50 MCG/ACT nasal spray, , Disp: , Rfl:     rizatriptan (MAXALT) 10 MG tablet, TAKE 1/2 TABLET BY MOUTH AT ONSET OF HEADACHE FOR MIGRAINE. MAY REPEAT IN 2 HOURS. MAX OF 3 TABLETS BY MOUTH IN 24 HOURS, Disp: 10 tablet, Rfl: 0    tadalafil (CIALIS) 10 MG tablet, Take 1 tablet (10 mg) by mouth daily as needed Take 30 minutes prior to sexual activity, no more than once daily., Disp: 12 tablet, Rfl: 11    topiramate (TOPAMAX) 50 MG tablet, Take 1 tablet (50 mg) by mouth 2 times daily, Disp: 60 tablet, Rfl: 3    Current Facility-Administered Medications:     botulinum toxin type A (BOTOX) 100 units injection 155 Units, 155 Units, Intramuscular, Q90 Days, Standal, Kim Johnson MD, 155  Units at 10/10/23 1450       BOTULINUM NEUROTOXIN INJECTION PROCEDURES    VERIFICATION OF PATIENT IDENTIFICATION AND PROCEDURE     Initials   Patient Name SES   Patient  SES   Procedure Verified by: SES     Prior to the start of the procedure and with procedural staff participation, I verbally confirmed the patient s identity using two indicators, relevant allergies, that the procedure was appropriate and matched the consent or emergent situation, and that the correct equipment/implants were available. Immediately prior to starting the procedure I conducted the Time Out with the procedural staff and re-confirmed the patient s name, procedure, and site/side. (The Joint Commission universal protocol was followed.)  Yes    Sedation (Moderate or Deep): None    ABOVE ASSESSMENTS PERFORMED BY    Kim Long MD      INDICATIONS FOR PROCEDURES  Westley Singh is a 53 year old patient with chronic migraine headaches which have been recalcitrant to oral medications and conservative therapy.  He is here today for injection with Botox.    GOAL OF PROCEDURE  The goal of this procedure is to increase active range of motion, improve volitional motor control and decrease pain .      TOTAL DOSE ADMINISTERED  Dose Administered:  155 units  Botox (Botulinum Toxin Type A)       2:1 Dilution   Unavoidable Drug Waste: Yes  Amount of drug waste (mL): 45 units Botox.  Reason for waste:  Single use vial  Diluent Used:  0.25% bupivacaine  Total Volume of Diluent Used:  3 ml  NDC #: Botox 100u ([address]-84)      CONSENT  The risks, benefits, and treatment options were discussed with Westley Singh and he agreed to proceed.    Written consent was obtained by [facility].     EQUIPMENT USED  Needle-30 gauge    SKIN PREPARATION  Skin preparation was performed using an alcohol wipe.    GUIDANCE DESCRIPTION  Guidance was not utilized for this procedure       AREA/MUSCLE INJECTED: 155 UNITS BOTOX = TOTAL DOSE, 2:1 DILUTION  1. NECK & SHOULDER  GIRDLE MUSCLES: 35 UNITS BOTOX = TOTAL DOSE, 2:1 DILUTION  Right Upper Trapezius (upper, mid & low cervical) - 7.5 units of Botox at 3 site/s.   Left Upper Trapezius (upper, mid & low cervical) - 7.5 units of Botox at 3 site/s.     Right Splenius - 5 units of Botox at 1 site/s.   Left Splenius - 5 units of Botox at 1 site/s.     Right Levator Scapulae - 5 units of Botox at 1 site/s.   Left Levator Scapulae - 5 units of Botox at 1 site/s.    3. FACIAL & SCALP MUSCLES: 120 UNITS BOTOX = TOTAL DOSE, 2:1 DILUTION  Right Frontalis - 10 units of Botox at 2 site/s.  Left Frontalis - 10 units of Botox at 2 site/s.    Right Temporalis - 25 units of Botox at 5 site/s.  Left Temporalis - 25 units of Botox at 5 site/s.    Right Occipitalis - 20 units of Botox at 4 site/s.   Left Occipitalis - 20 units of Botox at 4 site/s.     Right  - 2.5 units of Botox at 1 site/s.   Left  - 2.5 units of Botox at 1 site/s.    Procerus - 5 units of Botox at 1 site/s.       RESPONSE TO PROCEDURE  Westley Singh tolerated the procedure well and there were no immediate complications. He was allowed to recover for an appropriate period of time and was discharged home in stable condition.    ASSESSMENT AND PLAN   Botulinum toxin injections: No changes made to Botox dose or distribution today. Patient will continue to monitor response to Botox and report at next appointment.    Medications: No changes.   Follow up: Westley Singh was rescheduled for the next series of injections in 12 weeks, at which time we will evaluate response to today's injections. he may call the clinic prior with any questions or concerns prior to the next appointment.

## 2024-01-04 NOTE — LETTER
1/4/2024         RE: Westley Singh  [id number] [address] [facility] [id number]        Dear Colleague,    Thank you for referring your patient, Westley Singh, to the [facility]. Please see a copy of my visit note below.      [facility]    PM&R CLINIC NOTE  BOTULINUM TOXIN PROCEDURE      HPI  Chief Complaint   Patient presents with     Procedure     Botox     Westley Singh is a 53 year old male with a history of post concussion syndrome and intractable migraine headaches who presents to clinic for botulinum toxin injections for management of chronic migraine headaches.     SINCE LAST VISIT  Westley Singh was last seen here in clinic on 10/10/2023, at which time he received 155 units of Botox.     Patient denies new medical diagnoses, illnesses, hospitalizations, emergency room visits, and injuries since the previous injection with botulinum neurotoxin.     He reports that overall, response to Botox was seemingly less effective this round, as he did seem to have more headaches which were debilitating, necessitating the use of a medrol dosepak, which did seem to work quite well to bring his headaches down to a more tolerable level.      RESPONSE TO PREVIOUS TREATMENT    Side effects: No problems reported.  Post-procedural headache: Yes. He did get a headache that afternoon, which lasted into the next day.     1.  Headache frequency during this injection cycle:  30 headache days per month, but he had at least 50% reduction in headache intensity.  Headache intensity used to be a 6/10 on average, and has decreased to a 3/10 on average. This is compared to his baseline headache frequency of 30 headache days per month.     2.  Headache duration during this injection cycle:  Headache duration ranged from 2 hours to several days. Patient reports one episodes of multiple day headaches during this injection cycle.     3.  Headache intensity during this injection cycle:    A.   3/10  =  Typical pain level.  B.  7/10  =  Worst pain level.  C.  0/10  =  Lowest pain level.    4.  Change in headache medication usage during this injection cycle:  (For Example:  Able to decrease use of oral pain medications.) No changes, although he is currently taking Tylenol every day, and is taking less than 4000 mg daily.     5.  ER Visits During This Injection Cycle:  None.     6.  Functional Performance:  Change in ADL's, social interaction, days lost from work, etc. Patient reports being able to more fully participate in social and family activities and responsibilities as headache symptoms have improved      PHYSICAL EXAM  VS: /84 (BP Location: Right arm, Patient Position: Sitting)   Pulse 89    GEN: Pleasant and cooperative, in no acute distress  HEENT: No facial asymmetry    ALLERGIES  Allergies   Allergen Reactions     Aspirin      Cephalosporins      Penicillins        CURRENT MEDICATIONS    Current Outpatient Medications:      albuterol (PROAIR HFA/PROVENTIL HFA/VENTOLIN HFA) 108 (90 Base) MCG/ACT inhaler, INHALE TWO PUFFS BY MOUT EVERY SIX HOURS AS NEEDED FOR SHORNTESS OF BREATH OF DYPSNEA, Disp: 8.5 g, Rfl: 0     amitriptyline (ELAVIL) 50 MG tablet, Take 1-2 tablets ( mg) by mouth at bedtime, Disp: 60 tablet, Rfl: 3     amLODIPine (NORVASC) 5 MG tablet, Take 1 tablet (5 mg) by mouth daily, Disp: 90 tablet, Rfl: 3     fluticasone-salmeterol (ADVAIR) 250-50 MCG/ACT inhaler, Inhale 1 puff into the lungs every 12 hours, Disp: 60 each, Rfl: 2     mometasone (NASONEX) 50 MCG/ACT nasal spray, , Disp: , Rfl:      rizatriptan (MAXALT) 10 MG tablet, TAKE 1/2 TABLET BY MOUTH AT ONSET OF HEADACHE FOR MIGRAINE. MAY REPEAT IN 2 HOURS. MAX OF 3 TABLETS BY MOUTH IN 24 HOURS, Disp: 10 tablet, Rfl: 0     tadalafil (CIALIS) 10 MG tablet, Take 1 tablet (10 mg) by mouth daily as needed Take 30 minutes prior to sexual activity, no more than once daily., Disp: 12 tablet, Rfl: 11     topiramate (TOPAMAX) 50  MG tablet, Take 1 tablet (50 mg) by mouth 2 times daily, Disp: 60 tablet, Rfl: 3    Current Facility-Administered Medications:      botulinum toxin type A (BOTOX) 100 units injection 155 Units, 155 Units, Intramuscular, Q90 Days, Kim Long MD, 155 Units at 10/10/23 1450       BOTULINUM NEUROTOXIN INJECTION PROCEDURES    VERIFICATION OF PATIENT IDENTIFICATION AND PROCEDURE     Initials   Patient Name SES   Patient  SES   Procedure Verified by: SES     Prior to the start of the procedure and with procedural staff participation, I verbally confirmed the patient s identity using two indicators, relevant allergies, that the procedure was appropriate and matched the consent or emergent situation, and that the correct equipment/implants were available. Immediately prior to starting the procedure I conducted the Time Out with the procedural staff and re-confirmed the patient s name, procedure, and site/side. (The Joint Commission universal protocol was followed.)  Yes    Sedation (Moderate or Deep): None    ABOVE ASSESSMENTS PERFORMED BY    Kim Long MD      INDICATIONS FOR PROCEDURES  Westley Singh is a 53 year old patient with chronic migraine headaches which have been recalcitrant to oral medications and conservative therapy.  He is here today for injection with Botox.    GOAL OF PROCEDURE  The goal of this procedure is to increase active range of motion, improve volitional motor control and decrease pain .      TOTAL DOSE ADMINISTERED  Dose Administered:  155 units  Botox (Botulinum Toxin Type A)       2:1 Dilution   Unavoidable Drug Waste: Yes  Amount of drug waste (mL): 45 units Botox.  Reason for waste:  Single use vial  Diluent Used:  0.25% bupivacaine  Total Volume of Diluent Used:  3 ml  NDC #: Botox 100u ([address]-32)      CONSENT  The risks, benefits, and treatment options were discussed with Westley Singh and he agreed to proceed.    Written consent was obtained by [facility].     EQUIPMENT  USED  Needle-30 gauge    SKIN PREPARATION  Skin preparation was performed using an alcohol wipe.    GUIDANCE DESCRIPTION  Guidance was not utilized for this procedure       AREA/MUSCLE INJECTED: 155 UNITS BOTOX = TOTAL DOSE, 2:1 DILUTION  1. NECK & SHOULDER GIRDLE MUSCLES: 35 UNITS BOTOX = TOTAL DOSE, 2:1 DILUTION  Right Upper Trapezius (upper, mid & low cervical) - 7.5 units of Botox at 3 site/s.   Left Upper Trapezius (upper, mid & low cervical) - 7.5 units of Botox at 3 site/s.     Right Splenius - 5 units of Botox at 1 site/s.   Left Splenius - 5 units of Botox at 1 site/s.     Right Levator Scapulae - 5 units of Botox at 1 site/s.   Left Levator Scapulae - 5 units of Botox at 1 site/s.    3. FACIAL & SCALP MUSCLES: 120 UNITS BOTOX = TOTAL DOSE, 2:1 DILUTION  Right Frontalis - 10 units of Botox at 2 site/s.  Left Frontalis - 10 units of Botox at 2 site/s.    Right Temporalis - 25 units of Botox at 5 site/s.  Left Temporalis - 25 units of Botox at 5 site/s.    Right Occipitalis - 20 units of Botox at 4 site/s.   Left Occipitalis - 20 units of Botox at 4 site/s.     Right  - 2.5 units of Botox at 1 site/s.   Left  - 2.5 units of Botox at 1 site/s.    Procerus - 5 units of Botox at 1 site/s.       RESPONSE TO PROCEDURE  Westley Singh tolerated the procedure well and there were no immediate complications. He was allowed to recover for an appropriate period of time and was discharged home in stable condition.    ASSESSMENT AND PLAN   Botulinum toxin injections: No changes made to Botox dose or distribution today. Patient will continue to monitor response to Botox and report at next appointment.    Medications: No changes.   Follow up: Westley Singh was rescheduled for the next series of injections in 12 weeks, at which time we will evaluate response to today's injections. he may call the clinic prior with any questions or concerns prior to the next appointment.         Again, thank you for allowing  me to participate in the care of your patient.        Sincerely,        Kim Long MD

## 2024-01-04 NOTE — NURSING NOTE
Chief Complaint   Patient presents with    Procedure     Botox     Stefani Dominguez MA on 1/4/2024 at 8:51 AM

## 2024-01-06 RX ORDER — BUPIVACAINE HYDROCHLORIDE 2.5 MG/ML
3 INJECTION, SOLUTION EPIDURAL; INFILTRATION; INTRACAUDAL ONCE
Status: COMPLETED | OUTPATIENT
Start: 2024-01-06 | End: 2024-01-06

## 2024-01-06 RX ADMIN — BUPIVACAINE HYDROCHLORIDE 7.5 MG: 2.5 INJECTION, SOLUTION EPIDURAL; INFILTRATION; INTRACAUDAL at 14:10

## 2024-01-12 DIAGNOSIS — G43.719 CHRONIC MIGRAINE WITHOUT AURA, INTRACTABLE, WITHOUT STATUS MIGRAINOSUS: ICD-10-CM

## 2024-01-17 RX ORDER — RIZATRIPTAN BENZOATE 10 MG/1
TABLET ORAL
Qty: 10 TABLET | Refills: 0 | Status: SHIPPED | OUTPATIENT
Start: 2024-01-17 | End: 2024-02-22

## 2024-02-09 DIAGNOSIS — I10 BENIGN ESSENTIAL HYPERTENSION: ICD-10-CM

## 2024-02-09 RX ORDER — AMLODIPINE BESYLATE 5 MG/1
5 TABLET ORAL DAILY
Qty: 90 TABLET | Refills: 0 | Status: SHIPPED | OUTPATIENT
Start: 2024-02-09 | End: 2024-04-15

## 2024-02-14 DIAGNOSIS — G43.719 CHRONIC MIGRAINE WITHOUT AURA, INTRACTABLE, WITHOUT STATUS MIGRAINOSUS: ICD-10-CM

## 2024-02-20 NOTE — TELEPHONE ENCOUNTER
Attempted to reach patient, LMTCB to clarify how many he has used in the last month.     Waiting for call back.    THANG Gonzalez on 2/20/2024 at 2:50 PM

## 2024-02-21 NOTE — TELEPHONE ENCOUNTER
This medication is not prescribed by me.  Please route to appropriate pool.     Abby Fuentes CNP

## 2024-02-22 RX ORDER — RIZATRIPTAN BENZOATE 10 MG/1
TABLET ORAL
Qty: 10 TABLET | Refills: 0 | Status: SHIPPED | OUTPATIENT
Start: 2024-02-22 | End: 2024-02-23

## 2024-02-23 DIAGNOSIS — G43.719 CHRONIC MIGRAINE WITHOUT AURA, INTRACTABLE, WITHOUT STATUS MIGRAINOSUS: ICD-10-CM

## 2024-02-23 NOTE — TELEPHONE ENCOUNTER
Refill request for the following medication (s) listed below.    Pending Prescriptions:                       Disp   Refills    rizatriptan (MAXALT) 10 MG tablet         10 tab*0            Sig: TAKE 1/2 TABLET BY MOUTH AT ONSET OF HEADACHE FOR           MIGRAINE. MAY REPEAT IN 2 HOURS.  MAX OF 3           TABLETS BY MOUTH IN 24 HOURS      Last office visit provider:  01/04/24  Next appointment scheduled: 03/28/24      Medication T'd for review and signature  Stefani Dominguez MA on 2/23/2024 at 11:38 AM

## 2024-02-26 RX ORDER — RIZATRIPTAN BENZOATE 10 MG/1
TABLET ORAL
Qty: 10 TABLET | Refills: 0 | Status: SHIPPED | OUTPATIENT
Start: 2024-02-26 | End: 2024-04-05

## 2024-03-06 ENCOUNTER — MYC MEDICAL ADVICE (OUTPATIENT)
Dept: FAMILY MEDICINE | Facility: CLINIC | Age: 54
End: 2024-03-06
Payer: COMMERCIAL

## 2024-03-27 DIAGNOSIS — G47.00 INSOMNIA, UNSPECIFIED TYPE: ICD-10-CM

## 2024-03-27 DIAGNOSIS — F07.81 POST CONCUSSION SYNDROME: ICD-10-CM

## 2024-03-27 DIAGNOSIS — G43.719 CHRONIC MIGRAINE WITHOUT AURA, INTRACTABLE, WITHOUT STATUS MIGRAINOSUS: ICD-10-CM

## 2024-03-28 ENCOUNTER — OFFICE VISIT (OUTPATIENT)
Dept: PHYSICAL MEDICINE AND REHAB | Facility: CLINIC | Age: 54
End: 2024-03-28
Payer: COMMERCIAL

## 2024-03-28 VITALS — SYSTOLIC BLOOD PRESSURE: 129 MMHG | DIASTOLIC BLOOD PRESSURE: 96 MMHG | HEART RATE: 82 BPM

## 2024-03-28 DIAGNOSIS — G43.719 CHRONIC MIGRAINE WITHOUT AURA, INTRACTABLE, WITHOUT STATUS MIGRAINOSUS: Primary | ICD-10-CM

## 2024-03-28 PROCEDURE — 64615 CHEMODENERV MUSC MIGRAINE: CPT | Performed by: PHYSICAL MEDICINE & REHABILITATION

## 2024-03-28 NOTE — PROGRESS NOTES
[facility]    PM&R CLINIC NOTE  BOTULINUM TOXIN PROCEDURE      HPI  Chief Complaint   Patient presents with    Procedure     Botox     Westley Singh is a 53 year old male with a history of post concussion syndrome and intractable migraine headaches who presents to clinic for botulinum toxin injections for management of chronic migraine headaches.     SINCE LAST VISIT  Westley Singh was last seen here in clinic on 1/4/2023, at which time he received 155 units of Botox.     Patient denies new medical diagnoses, illnesses, hospitalizations, emergency room visits, and injuries since the previous injection with botulinum neurotoxin.       RESPONSE TO PREVIOUS TREATMENT    Side effects: No problems reported.  Post-procedural headache: Yes. He did get a headache that afternoon, which lasted into the next day.     1.  Headache frequency during this injection cycle:  30 headache days per month, but he had at least 50% reduction in headache intensity.  Headache intensity used to be a 6/10 on average, and has decreased to a 3/10 on average. This is compared to his baseline headache frequency of 30 headache days per month.     2.  Headache duration during this injection cycle:  Headache duration ranged from 2 hours to several days. Patient reports one episodes of multiple day headaches during this injection cycle.     3.  Headache intensity during this injection cycle:    A.  3/10  =  Typical pain level.  B.  7/10  =  Worst pain level.  C.  0/10  =  Lowest pain level.    4.  Change in headache medication usage during this injection cycle:  (For Example:  Able to decrease use of oral pain medications.) No changes, although he is currently taking Tylenol every day, and is taking less than 4000 mg daily.     5.  ER Visits During This Injection Cycle:  None.     6.  Functional Performance:  Change in ADL's, social interaction, days lost from work, etc. Patient reports being able to more fully participate  in social and family activities and responsibilities as headache symptoms have improved      PHYSICAL EXAM  VS: BP (!) 129/96 (BP Location: Right arm, Patient Position: Sitting)   Pulse 82    GEN: Pleasant and cooperative, in no acute distress  HEENT: No facial asymmetry    ALLERGIES  Allergies   Allergen Reactions    Aspirin     Cephalosporins     Penicillins        CURRENT MEDICATIONS    Current Outpatient Medications:     albuterol (PROAIR HFA/PROVENTIL HFA/VENTOLIN HFA) 108 (90 Base) MCG/ACT inhaler, INHALE TWO PUFFS BY MOUT EVERY SIX HOURS AS NEEDED FOR SHORNTESS OF BREATH OF DYPSNEA, Disp: 8.5 g, Rfl: 0    amitriptyline (ELAVIL) 50 MG tablet, Take 1-2 tablets ( mg) by mouth at bedtime, Disp: 60 tablet, Rfl: 3    amLODIPine (NORVASC) 5 MG tablet, TAKE ONE TABLET BY MOUTH ONE TIME DAILY, Disp: 90 tablet, Rfl: 0    fluticasone-salmeterol (ADVAIR) 250-50 MCG/ACT inhaler, Inhale 1 puff into the lungs every 12 hours, Disp: 60 each, Rfl: 2    mometasone (NASONEX) 50 MCG/ACT nasal spray, , Disp: , Rfl:     rizatriptan (MAXALT) 10 MG tablet, TAKE 1/2 TABLET BY MOUTH AT ONSET OF HEADACHE FOR MIGRAINE. MAY REPEAT IN 2 HOURS.  MAX OF 3 TABLETS BY MOUTH IN 24 HOURS, Disp: 10 tablet, Rfl: 0    tadalafil (CIALIS) 10 MG tablet, Take 1 tablet (10 mg) by mouth daily as needed Take 30 minutes prior to sexual activity, no more than once daily., Disp: 12 tablet, Rfl: 11    topiramate (TOPAMAX) 50 MG tablet, Take 1 tablet (50 mg) by mouth 2 times daily, Disp: 60 tablet, Rfl: 3    Current Facility-Administered Medications:     botulinum toxin type A (BOTOX) 100 units injection 155 Units, 155 Units, Intramuscular, Q90 Days, StandKim rooney MD, 155 Units at 24 1408       BOTULINUM NEUROTOXIN INJECTION PROCEDURES    VERIFICATION OF PATIENT IDENTIFICATION AND PROCEDURE     Initials   Patient Name SES   Patient  SES   Procedure Verified by: SES     Prior to the start of the procedure and with procedural staff  participation, I verbally confirmed the patient s identity using two indicators, relevant allergies, that the procedure was appropriate and matched the consent or emergent situation, and that the correct equipment/implants were available. Immediately prior to starting the procedure I conducted the Time Out with the procedural staff and re-confirmed the patient s name, procedure, and site/side. (The Joint Commission universal protocol was followed.)  Yes    Sedation (Moderate or Deep): None    ABOVE ASSESSMENTS PERFORMED BY    Kim Long MD      INDICATIONS FOR PROCEDURES  Westley Singh is a 53 year old patient with chronic migraine headaches which have been recalcitrant to oral medications and conservative therapy.  He is here today for injection with Botox.    GOAL OF PROCEDURE  The goal of this procedure is to increase active range of motion, improve volitional motor control and decrease pain .      TOTAL DOSE ADMINISTERED  Dose Administered:  155 units  Botox (Botulinum Toxin Type A)       2:1 Dilution   Unavoidable Drug Waste: Yes  Amount of drug waste (mL): 45 units Botox.  Reason for waste:  Single use vial  Diluent Used:  0.25% bupivacaine  Total Volume of Diluent Used:  3 ml  NDC #: Botox 100u ([address]-48)      CONSENT  The risks, benefits, and treatment options were discussed with Westley Singh and he agreed to proceed.    Written consent was obtained by [facility].     EQUIPMENT USED  Needle-30 gauge    SKIN PREPARATION  Skin preparation was performed using an alcohol wipe.    GUIDANCE DESCRIPTION  Guidance was not utilized for this procedure       AREA/MUSCLE INJECTED: 155 UNITS BOTOX = TOTAL DOSE, 2:1 DILUTION  1. NECK & SHOULDER GIRDLE MUSCLES: 35 UNITS BOTOX = TOTAL DOSE, 2:1 DILUTION  Right Upper Trapezius (upper, mid & low cervical) - 7.5 units of Botox at 3 site/s.   Left Upper Trapezius (upper, mid & low cervical) - 7.5 units of Botox at 3 site/s.     Right Splenius - 5 units of Botox at 1  site/s.   Left Splenius - 5 units of Botox at 1 site/s.     Right Levator Scapulae - 5 units of Botox at 1 site/s.   Left Levator Scapulae - 5 units of Botox at 1 site/s.    3. FACIAL & SCALP MUSCLES: 120 UNITS BOTOX = TOTAL DOSE, 2:1 DILUTION  Right Frontalis - 10 units of Botox at 2 site/s.  Left Frontalis - 10 units of Botox at 2 site/s.    Right Temporalis - 25 units of Botox at 5 site/s.  Left Temporalis - 25 units of Botox at 5 site/s.    Right Occipitalis - 20 units of Botox at 4 site/s.   Left Occipitalis - 20 units of Botox at 4 site/s.     Right  - 2.5 units of Botox at 1 site/s.   Left  - 2.5 units of Botox at 1 site/s.    Procerus - 5 units of Botox at 1 site/s.       RESPONSE TO PROCEDURE  Westely Singh tolerated the procedure well and there were no immediate complications. He was allowed to recover for an appropriate period of time and was discharged home in stable condition.    ASSESSMENT AND PLAN   Botulinum toxin injections: No changes made to Botox dose or distribution today. Patient will continue to monitor response to Botox and report at next appointment.    Medications: Prescription for Nurtec sent to pharmacy to be used as a migraine rescue.   Follow up: Westley Singh was rescheduled for the next series of injections in 12 weeks, at which time we will evaluate response to today's injections. he may call the clinic prior with any questions or concerns prior to the next appointment.

## 2024-03-28 NOTE — PROGRESS NOTES
[facility]    PM&R CLINIC NOTE  BOTULINUM TOXIN PROCEDURE      HPI  Chief Complaint   Patient presents with    Procedure     Botox     Westley Singh is a 53 year old male with a history of post concussion syndrome and intractable migraine headaches who presents to clinic for botulinum toxin injections for management of chronic migraine headaches.     SINCE LAST VISIT  Westley Singh was last seen here in clinic on 1/4/2024, at which time he received 155 units of Botox.     Patient denies new medical diagnoses, illnesses, hospitalizations, emergency room visits, and injuries since the previous injection with botulinum neurotoxin.     RESPONSE TO PREVIOUS TREATMENT    Side effects: No problems reported.  Post-procedural headache: Yes. He did get a headache that afternoon, which lasted into the next day.     1.  Headache frequency during this injection cycle:  4 migraine headache days per month. This is compared to his baseline headache frequency of 30 headache days per month.     2.  Headache duration during this injection cycle:  Headache duration ranged from 2 hours to several days. Patient reports one episodes of multiple day headaches during this injection cycle.     3.  Headache intensity during this injection cycle:    A.  3/10  =  Typical pain level.  B.  7/10  =  Worst pain level.  C.  0/10  =  Lowest pain level.    4.  Change in headache medication usage during this injection cycle:  (For Example:  Able to decrease use of oral pain medications.) He uses rizatriptan as a rescue. He continues taking topiramate 50 mg bid and amitriptyline 100 mg at bedtime for migraine prevention.     5.  ER Visits During This Injection Cycle:  None.     6.  Functional Performance:  Change in ADL's, social interaction, days lost from work, etc. Patient reports being able to more fully participate in social and family activities and responsibilities as headache symptoms have improved      PHYSICAL  EXAM  VS: BP (!) 129/96 (BP Location: Right arm, Patient Position: Sitting)   Pulse 82    GEN: Pleasant and cooperative, in no acute distress  HEENT: No facial asymmetry    ALLERGIES  Allergies   Allergen Reactions    Aspirin     Cephalosporins     Penicillins        CURRENT MEDICATIONS    Current Outpatient Medications:     albuterol (PROAIR HFA/PROVENTIL HFA/VENTOLIN HFA) 108 (90 Base) MCG/ACT inhaler, INHALE TWO PUFFS BY MOUT EVERY SIX HOURS AS NEEDED FOR SHORNTESS OF BREATH OF DYPSNEA, Disp: 8.5 g, Rfl: 0    amitriptyline (ELAVIL) 50 MG tablet, Take 1-2 tablets ( mg) by mouth at bedtime, Disp: 60 tablet, Rfl: 3    amLODIPine (NORVASC) 5 MG tablet, TAKE ONE TABLET BY MOUTH ONE TIME DAILY, Disp: 90 tablet, Rfl: 0    fluticasone-salmeterol (ADVAIR) 250-50 MCG/ACT inhaler, Inhale 1 puff into the lungs every 12 hours, Disp: 60 each, Rfl: 2    mometasone (NASONEX) 50 MCG/ACT nasal spray, , Disp: , Rfl:     rizatriptan (MAXALT) 10 MG tablet, TAKE 1/2 TABLET BY MOUTH AT ONSET OF HEADACHE FOR MIGRAINE. MAY REPEAT IN 2 HOURS.  MAX OF 3 TABLETS BY MOUTH IN 24 HOURS, Disp: 10 tablet, Rfl: 0    tadalafil (CIALIS) 10 MG tablet, Take 1 tablet (10 mg) by mouth daily as needed Take 30 minutes prior to sexual activity, no more than once daily., Disp: 12 tablet, Rfl: 11    topiramate (TOPAMAX) 50 MG tablet, Take 1 tablet (50 mg) by mouth 2 times daily, Disp: 60 tablet, Rfl: 3    Current Facility-Administered Medications:     botulinum toxin type A (BOTOX) 100 units injection 155 Units, 155 Units, Intramuscular, Q90 Days, Standal, Kim Johnson MD, 155 Units at 24 1408       BOTULINUM NEUROTOXIN INJECTION PROCEDURES    VERIFICATION OF PATIENT IDENTIFICATION AND PROCEDURE     Initials   Patient Name SES   Patient  SES   Procedure Verified by: SES     Prior to the start of the procedure and with procedural staff participation, I verbally confirmed the patient s identity using two indicators, relevant allergies, that  the procedure was appropriate and matched the consent or emergent situation, and that the correct equipment/implants were available. Immediately prior to starting the procedure I conducted the Time Out with the procedural staff and re-confirmed the patient s name, procedure, and site/side. (The Joint Commission universal protocol was followed.)  Yes    Sedation (Moderate or Deep): None    ABOVE ASSESSMENTS PERFORMED BY    Kim Long MD      INDICATIONS FOR PROCEDURES  Westley Singh is a 53 year old patient with chronic migraine headaches which have been recalcitrant to oral medications and conservative therapy.  He is here today for injection with Botox.    GOAL OF PROCEDURE  The goal of this procedure is to increase active range of motion, improve volitional motor control and decrease pain .      TOTAL DOSE ADMINISTERED  Dose Administered:  155 units  Botox (Botulinum Toxin Type A)       2:1 Dilution   Unavoidable Drug Waste: Yes  Amount of drug waste (mL): 45 units Botox.  Reason for waste:  Single use vial  Diluent Used:  0.25% bupivacaine  Total Volume of Diluent Used:  3 ml  NDC #: Botox 100u ([address]-04)      CONSENT  The risks, benefits, and treatment options were discussed with Westley Singh and he agreed to proceed.    Written consent was obtained by [facility].     EQUIPMENT USED  Needle-30 gauge    SKIN PREPARATION  Skin preparation was performed using an alcohol wipe.    GUIDANCE DESCRIPTION  Guidance was not utilized for this procedure       AREA/MUSCLE INJECTED: 155 UNITS BOTOX = TOTAL DOSE, 2:1 DILUTION  1. NECK & SHOULDER GIRDLE MUSCLES: 35 UNITS BOTOX = TOTAL DOSE, 2:1 DILUTION  Right Upper Trapezius (upper, mid & low cervical) - 7.5 units of Botox at 3 site/s.   Left Upper Trapezius (upper, mid & low cervical) - 7.5 units of Botox at 3 site/s.     Right Splenius - 5 units of Botox at 1 site/s.   Left Splenius - 5 units of Botox at 1 site/s.     Right Levator Scapulae - 5 units of Botox at 1  site/s.   Left Levator Scapulae - 5 units of Botox at 1 site/s.    3. FACIAL & SCALP MUSCLES: 120 UNITS BOTOX = TOTAL DOSE, 2:1 DILUTION  Right Frontalis - 10 units of Botox at 2 site/s.  Left Frontalis - 10 units of Botox at 2 site/s.    Right Temporalis - 25 units of Botox at 5 site/s.  Left Temporalis - 25 units of Botox at 5 site/s.    Right Occipitalis - 20 units of Botox at 4 site/s.   Left Occipitalis - 20 units of Botox at 4 site/s.     Right  - 2.5 units of Botox at 1 site/s.   Left  - 2.5 units of Botox at 1 site/s.    Procerus - 5 units of Botox at 1 site/s.       RESPONSE TO PROCEDURE  Westley Singh tolerated the procedure well and there were no immediate complications. He was allowed to recover for an appropriate period of time and was discharged home in stable condition.    ASSESSMENT AND PLAN   Botulinum toxin injections: No changes made to Botox dose or distribution today. Patient will continue to monitor response to Botox and report at next appointment.    Medications: No changes.   Follow up: Westley Singh was rescheduled for the next series of injections in 12 weeks, at which time we will evaluate response to today's injections. he may call the clinic prior with any questions or concerns prior to the next appointment.

## 2024-03-28 NOTE — NURSING NOTE
Chief Complaint   Patient presents with    Procedure     Botox     Stefani Dominguez MA on 3/28/2024 at 11:18 AM

## 2024-03-28 NOTE — LETTER
3/28/2024         RE: Westley Singh  [address] [address] [facility] [id number]        Dear Colleague,    Thank you for referring your patient, Westley Singh, to the [facility]. Please see a copy of my visit note below.      [facility]    PM&R CLINIC NOTE  BOTULINUM TOXIN PROCEDURE      HPI  Chief Complaint   Patient presents with     Procedure     Botox     Westley Singh is a 53 year old male with a history of post concussion syndrome and intractable migraine headaches who presents to clinic for botulinum toxin injections for management of chronic migraine headaches.     SINCE LAST VISIT  Westley Singh was last seen here in clinic on 1/4/2023, at which time he received 155 units of Botox.     Patient denies new medical diagnoses, illnesses, hospitalizations, emergency room visits, and injuries since the previous injection with botulinum neurotoxin.       RESPONSE TO PREVIOUS TREATMENT    Side effects: No problems reported.  Post-procedural headache: Yes. He did get a headache that afternoon, which lasted into the next day.     1.  Headache frequency during this injection cycle:  30 headache days per month, but he had at least 50% reduction in headache intensity.  Headache intensity used to be a 6/10 on average, and has decreased to a 3/10 on average. This is compared to his baseline headache frequency of 30 headache days per month.     2.  Headache duration during this injection cycle:  Headache duration ranged from 2 hours to several days. Patient reports one episodes of multiple day headaches during this injection cycle.     3.  Headache intensity during this injection cycle:    A.  3/10  =  Typical pain level.  B.  7/10  =  Worst pain level.  C.  0/10  =  Lowest pain level.    4.  Change in headache medication usage during this injection cycle:  (For Example:  Able to decrease use of oral pain medications.) No changes, although he is currently taking Tylenol  every day, and is taking less than 4000 mg daily.     5.  ER Visits During This Injection Cycle:  None.     6.  Functional Performance:  Change in ADL's, social interaction, days lost from work, etc. Patient reports being able to more fully participate in social and family activities and responsibilities as headache symptoms have improved      PHYSICAL EXAM  VS: BP (!) 129/96 (BP Location: Right arm, Patient Position: Sitting)   Pulse 82    GEN: Pleasant and cooperative, in no acute distress  HEENT: No facial asymmetry    ALLERGIES  Allergies   Allergen Reactions     Aspirin      Cephalosporins      Penicillins        CURRENT MEDICATIONS    Current Outpatient Medications:      albuterol (PROAIR HFA/PROVENTIL HFA/VENTOLIN HFA) 108 (90 Base) MCG/ACT inhaler, INHALE TWO PUFFS BY MOUT EVERY SIX HOURS AS NEEDED FOR SHORNTESS OF BREATH OF DYPSNEA, Disp: 8.5 g, Rfl: 0     amitriptyline (ELAVIL) 50 MG tablet, Take 1-2 tablets ( mg) by mouth at bedtime, Disp: 60 tablet, Rfl: 3     amLODIPine (NORVASC) 5 MG tablet, TAKE ONE TABLET BY MOUTH ONE TIME DAILY, Disp: 90 tablet, Rfl: 0     fluticasone-salmeterol (ADVAIR) 250-50 MCG/ACT inhaler, Inhale 1 puff into the lungs every 12 hours, Disp: 60 each, Rfl: 2     mometasone (NASONEX) 50 MCG/ACT nasal spray, , Disp: , Rfl:      rizatriptan (MAXALT) 10 MG tablet, TAKE 1/2 TABLET BY MOUTH AT ONSET OF HEADACHE FOR MIGRAINE. MAY REPEAT IN 2 HOURS.  MAX OF 3 TABLETS BY MOUTH IN 24 HOURS, Disp: 10 tablet, Rfl: 0     tadalafil (CIALIS) 10 MG tablet, Take 1 tablet (10 mg) by mouth daily as needed Take 30 minutes prior to sexual activity, no more than once daily., Disp: 12 tablet, Rfl: 11     topiramate (TOPAMAX) 50 MG tablet, Take 1 tablet (50 mg) by mouth 2 times daily, Disp: 60 tablet, Rfl: 3    Current Facility-Administered Medications:      botulinum toxin type A (BOTOX) 100 units injection 155 Units, 155 Units, Intramuscular, Q90 Days, Standal, Kim Johnson MD, 155 Units at  24 1408       BOTULINUM NEUROTOXIN INJECTION PROCEDURES    VERIFICATION OF PATIENT IDENTIFICATION AND PROCEDURE     Initials   Patient Name SES   Patient  SES   Procedure Verified by: ISIS     Prior to the start of the procedure and with procedural staff participation, I verbally confirmed the patient s identity using two indicators, relevant allergies, that the procedure was appropriate and matched the consent or emergent situation, and that the correct equipment/implants were available. Immediately prior to starting the procedure I conducted the Time Out with the procedural staff and re-confirmed the patient s name, procedure, and site/side. (The Joint Commission universal protocol was followed.)  Yes    Sedation (Moderate or Deep): None    ABOVE ASSESSMENTS PERFORMED BY    Kim Long MD      INDICATIONS FOR PROCEDURES  Wsetley Singh is a 53 year old patient with chronic migraine headaches which have been recalcitrant to oral medications and conservative therapy.  He is here today for injection with Botox.    GOAL OF PROCEDURE  The goal of this procedure is to increase active range of motion, improve volitional motor control and decrease pain .      TOTAL DOSE ADMINISTERED  Dose Administered:  155 units  Botox (Botulinum Toxin Type A)       2:1 Dilution   Unavoidable Drug Waste: Yes  Amount of drug waste (mL): 45 units Botox.  Reason for waste:  Single use vial  Diluent Used:  0.25% bupivacaine  Total Volume of Diluent Used:  3 ml  NDC #: Botox 100u ([address]-59)      CONSENT  The risks, benefits, and treatment options were discussed with Westley Singh and he agreed to proceed.    Written consent was obtained by [facility].     EQUIPMENT USED  Needle-30 gauge    SKIN PREPARATION  Skin preparation was performed using an alcohol wipe.    GUIDANCE DESCRIPTION  Guidance was not utilized for this procedure       AREA/MUSCLE INJECTED: 155 UNITS BOTOX = TOTAL DOSE, 2:1 DILUTION  1. NECK & SHOULDER GIRDLE  MUSCLES: 35 UNITS BOTOX = TOTAL DOSE, 2:1 DILUTION  Right Upper Trapezius (upper, mid & low cervical) - 7.5 units of Botox at 3 site/s.   Left Upper Trapezius (upper, mid & low cervical) - 7.5 units of Botox at 3 site/s.     Right Splenius - 5 units of Botox at 1 site/s.   Left Splenius - 5 units of Botox at 1 site/s.     Right Levator Scapulae - 5 units of Botox at 1 site/s.   Left Levator Scapulae - 5 units of Botox at 1 site/s.    3. FACIAL & SCALP MUSCLES: 120 UNITS BOTOX = TOTAL DOSE, 2:1 DILUTION  Right Frontalis - 10 units of Botox at 2 site/s.  Left Frontalis - 10 units of Botox at 2 site/s.    Right Temporalis - 25 units of Botox at 5 site/s.  Left Temporalis - 25 units of Botox at 5 site/s.    Right Occipitalis - 20 units of Botox at 4 site/s.   Left Occipitalis - 20 units of Botox at 4 site/s.     Right  - 2.5 units of Botox at 1 site/s.   Left  - 2.5 units of Botox at 1 site/s.    Procerus - 5 units of Botox at 1 site/s.       RESPONSE TO PROCEDURE  Westley Singh tolerated the procedure well and there were no immediate complications. He was allowed to recover for an appropriate period of time and was discharged home in stable condition.    ASSESSMENT AND PLAN   Botulinum toxin injections: No changes made to Botox dose or distribution today. Patient will continue to monitor response to Botox and report at next appointment.    Medications: Prescription for Nurtec sent to pharmacy to be used as a migraine rescue.   Follow up: Westley Singh was rescheduled for the next series of injections in 12 weeks, at which time we will evaluate response to today's injections. he may call the clinic prior with any questions or concerns prior to the next appointment.         Again, thank you for allowing me to participate in the care of your patient.        Sincerely,        Kim Long MD

## 2024-04-03 NOTE — TELEPHONE ENCOUNTER
Rizatriptan Benzoate Oral Tablet 10 MG       Last Written Prescription Date:  2-26-24  Last Fill Quantity: 10,   # refills: 0  Last Office Visit : 1-4-24 ( [facility])  Future Office visit:  6-20-24    Routing refill request to provider for review/approval because:  Last rx limited quantity      Amitriptyline HCl Oral Tablet 50 MG       Last Written Prescription Date:  11-27-23  Last Fill Quantity: 60,   # refills: 3    Routing refill request to provider for review/approval because:  Med not on PM & R protocol

## 2024-04-05 RX ORDER — BUPIVACAINE HYDROCHLORIDE 2.5 MG/ML
3 INJECTION, SOLUTION EPIDURAL; INFILTRATION; INTRACAUDAL ONCE
Status: COMPLETED | OUTPATIENT
Start: 2024-04-05 | End: 2024-04-05

## 2024-04-05 RX ORDER — RIZATRIPTAN BENZOATE 10 MG/1
TABLET ORAL
Qty: 10 TABLET | Refills: 0 | Status: SHIPPED | OUTPATIENT
Start: 2024-04-05 | End: 2024-05-17

## 2024-04-05 RX ORDER — AMITRIPTYLINE HYDROCHLORIDE 50 MG/1
50-100 TABLET ORAL AT BEDTIME
Qty: 60 TABLET | Refills: 3 | Status: SHIPPED | OUTPATIENT
Start: 2024-04-05 | End: 2024-08-14

## 2024-04-05 RX ADMIN — BUPIVACAINE HYDROCHLORIDE 7.5 MG: 2.5 INJECTION, SOLUTION EPIDURAL; INFILTRATION; INTRACAUDAL at 12:27

## 2024-04-06 DIAGNOSIS — G43.719 CHRONIC MIGRAINE WITHOUT AURA, INTRACTABLE, WITHOUT STATUS MIGRAINOSUS: ICD-10-CM

## 2024-04-08 SDOH — HEALTH STABILITY: PHYSICAL HEALTH: ON AVERAGE, HOW MANY MINUTES DO YOU ENGAGE IN EXERCISE AT THIS LEVEL?: 30 MIN

## 2024-04-08 SDOH — HEALTH STABILITY: PHYSICAL HEALTH: ON AVERAGE, HOW MANY DAYS PER WEEK DO YOU ENGAGE IN MODERATE TO STRENUOUS EXERCISE (LIKE A BRISK WALK)?: 3 DAYS

## 2024-04-08 ASSESSMENT — ASTHMA QUESTIONNAIRES
ACT_TOTALSCORE: 23
ACT_TOTALSCORE: 23
QUESTION_3 LAST FOUR WEEKS HOW OFTEN DID YOUR ASTHMA SYMPTOMS (WHEEZING, COUGHING, SHORTNESS OF BREATH, CHEST TIGHTNESS OR PAIN) WAKE YOU UP AT NIGHT OR EARLIER THAN USUAL IN THE MORNING: NOT AT ALL
QUESTION_1 LAST FOUR WEEKS HOW MUCH OF THE TIME DID YOUR ASTHMA KEEP YOU FROM GETTING AS MUCH DONE AT WORK, SCHOOL OR AT HOME: NONE OF THE TIME
QUESTION_5 LAST FOUR WEEKS HOW WOULD YOU RATE YOUR ASTHMA CONTROL: COMPLETELY CONTROLLED
QUESTION_2 LAST FOUR WEEKS HOW OFTEN HAVE YOU HAD SHORTNESS OF BREATH: ONCE OR TWICE A WEEK
QUESTION_4 LAST FOUR WEEKS HOW OFTEN HAVE YOU USED YOUR RESCUE INHALER OR NEBULIZER MEDICATION (SUCH AS ALBUTEROL): ONCE A WEEK OR LESS

## 2024-04-08 ASSESSMENT — SOCIAL DETERMINANTS OF HEALTH (SDOH): HOW OFTEN DO YOU GET TOGETHER WITH FRIENDS OR RELATIVES?: TWICE A WEEK

## 2024-04-08 NOTE — COMMUNITY RESOURCES LIST (ENGLISH)
April 8, 2024           YOUR PERSONALIZED LIST OF SERVICES & PROGRAMS           & SHELTER    Housing      Free - Client Services  [address] [place] [address] (Distance: 13.1 miles)  Phone: [phone]  Website: https://MostLikely.Blueknow/  Language: English  Fee: Free  Transportation Options: Free transportation      [facility] - Housing Stabilization Services  Phone: [phone]) 547-7454  Website: https://triptap/Housing-Stabilization.html  Language: English  Hours: Mon 9:00 AM - 5:00 PM Tue 9:00 AM - 5:00 PM Wed 9:00 AM - 5:00 PM Thu 9:00 AM - 5:00 PM Fri 9:00 AM - 5:00 PM  Fee: Insurance  Accessibility: Deaf or hard of hearing, Translation services      [facility] longterm  Phone: [phone]  Website: https://www.BigEvidence.org/  Language: English    Case Management      Living - Housing Stabilization Services  [address] [place] [address] (Distance: 14.4 miles)  Phone: [phone]  Website: https://Nvidia  Language: Telugu, English, Belarusian  Fee: Insurance, Self pay      [employer] - Housing Stabilization Services  [address] [place] [address] (Distance: 16.7 miles)  Phone: [phone]  Website: https://www.Spare to Shareorg/  Language: Telugu, English, Kinyarwanda, Hmong, Belarusian, Cameroonian  Fee: Insurance  Accessibility: Ada accessible, Blind accommodation, Deaf or hard of hearing, Translation services  Transportation Options: Free transportation      [employer] - Housing Stabilization Services  Phone: [phone]  Website: https://homebasemn.com/  Language: English  Hours: Mon 8:00 AM - 4:00 PM Tue 8:00 AM - 4:00 PM Wed 8:00 AM - 4:00 PM Thu 8:00 AM - 4:00 PM Fri 8:00 AM - 4:00 PM  Fee: Free  Accessibility: Blind accommodation, Deaf or hard of hearing  Transportation Options: Free transportation    Drop-In Services      [facility] Warming or cooling [facility]  [facility]  [address] [place] [address] (Distance: 3.0 miles)  Language: English, Malaysian, Austrian  Fee: Free      [facility] - Warming or cooling center - [facility]  [address] [place] [address] (Distance: 6.2 miles)  Phone: [phone]  Language: English, Malaysian, Guamanian  Fee: Free  Accessibility: Translation services, Ada accessible      LOVE - LAUNDRY LOVE  Website: http://www.laundrylove.org               IMPORTANT NUMBERS & WEBSITES        Emergency Services  911  .   United Way  211 http://211unitedway.org  .   Poison Control  (472) 151-3755 http://[place]poison.org http://wis[place]poison.org  .     Suicide and Crisis Lifeline  988 http://988lifeline.org  .   Childhelp [place] Child Abuse Hotline  321.811.4911 http://Childhelphotline.org   .   [place] Sexual Assault Hotline  (318) 293-6718 (HOPE) http://WebXiomn.org   .     [place] Runaway Safeline  (850) 958-5053 (RUNAWAY) http://Lion Biotechnologies.Slack  .   Pregnancy & Postpartum Support  Call/text 989-889-0216  [place]: http://ppsupportmn.org  [place]: http://Dinda.com.br.com/wi  .   Substance Abuse National Helpline ([facility])  882-127-HELP (5661) http://Findtreatment.gov   .                DISCLAIMER: These resources have been generated via the Spot [employer] Place[employer] Platform. [employer] does not endorse any service providers mentioned in this resource list. [employer] does not guarantee that the services mentioned in this resource list will be available to you or will improve your health or wellness.    [facility]

## 2024-04-11 ENCOUNTER — MYC REFILL (OUTPATIENT)
Dept: FAMILY MEDICINE | Facility: CLINIC | Age: 54
End: 2024-04-11
Payer: COMMERCIAL

## 2024-04-11 DIAGNOSIS — J45.30 MILD PERSISTENT ASTHMA WITHOUT COMPLICATION: ICD-10-CM

## 2024-04-11 RX ORDER — TOPIRAMATE 50 MG/1
50 TABLET, FILM COATED ORAL 2 TIMES DAILY
Qty: 60 TABLET | Refills: 0 | Status: SHIPPED | OUTPATIENT
Start: 2024-04-11 | End: 2024-05-17

## 2024-04-11 RX ORDER — FLUTICASONE PROPIONATE AND SALMETEROL 250; 50 UG/1; UG/1
1 POWDER RESPIRATORY (INHALATION) EVERY 12 HOURS
Qty: 60 EACH | Refills: 0 | Status: SHIPPED | OUTPATIENT
Start: 2024-04-11 | End: 2024-04-15

## 2024-04-11 NOTE — TELEPHONE ENCOUNTER
Refill request received from [facility]    Medication: topiramate (TOPAMAX) 50 MG tablet   Directions: Take 1 tablet (50 mg) by mouth 2 times daily      Last ordered: 11/27/23   Qty: #60  RF: 3  Last OV: 3/28/24  Next OV: 6/20/24    Routing to Dr. Long to review and sign if appropriate.    Violet Grimes, RN Care Coordinator  [facility]   PM [facility] R Clinic main phone # [phone] fax # [phone]

## 2024-04-13 ENCOUNTER — HEALTH MAINTENANCE LETTER (OUTPATIENT)
Age: 54
End: 2024-04-13

## 2024-04-15 ENCOUNTER — OFFICE VISIT (OUTPATIENT)
Dept: FAMILY MEDICINE | Facility: CLINIC | Age: 54
End: 2024-04-15
Payer: COMMERCIAL

## 2024-04-15 ENCOUNTER — TELEPHONE (OUTPATIENT)
Dept: FAMILY MEDICINE | Facility: CLINIC | Age: 54
End: 2024-04-15

## 2024-04-15 VITALS
TEMPERATURE: 97.9 F | HEART RATE: 75 BPM | HEIGHT: 72 IN | BODY MASS INDEX: 30.48 KG/M2 | RESPIRATION RATE: 16 BRPM | SYSTOLIC BLOOD PRESSURE: 125 MMHG | OXYGEN SATURATION: 98 % | WEIGHT: 225 LBS | DIASTOLIC BLOOD PRESSURE: 79 MMHG

## 2024-04-15 DIAGNOSIS — J45.30 MILD PERSISTENT ASTHMA WITHOUT COMPLICATION: ICD-10-CM

## 2024-04-15 DIAGNOSIS — J45.30 MILD PERSISTENT ASTHMA WITHOUT COMPLICATION: Primary | ICD-10-CM

## 2024-04-15 DIAGNOSIS — Z13.6 CARDIOVASCULAR SCREENING; LDL GOAL LESS THAN 160: ICD-10-CM

## 2024-04-15 DIAGNOSIS — Z12.11 SCREEN FOR COLON CANCER: Primary | ICD-10-CM

## 2024-04-15 DIAGNOSIS — Z13.220 SCREENING FOR HYPERLIPIDEMIA: ICD-10-CM

## 2024-04-15 DIAGNOSIS — I10 BENIGN ESSENTIAL HYPERTENSION: ICD-10-CM

## 2024-04-15 DIAGNOSIS — R73.09 ELEVATED GLUCOSE: ICD-10-CM

## 2024-04-15 DIAGNOSIS — Z00.00 ROUTINE GENERAL MEDICAL EXAMINATION AT A HEALTH CARE FACILITY: ICD-10-CM

## 2024-04-15 LAB — HBA1C MFR BLD: 5.2 % (ref 0–5.6)

## 2024-04-15 PROCEDURE — 90750 HZV VACC RECOMBINANT IM: CPT | Performed by: NURSE PRACTITIONER

## 2024-04-15 PROCEDURE — 90471 IMMUNIZATION ADMIN: CPT | Performed by: NURSE PRACTITIONER

## 2024-04-15 PROCEDURE — 99396 PREV VISIT EST AGE 40-64: CPT | Mod: 25 | Performed by: NURSE PRACTITIONER

## 2024-04-15 PROCEDURE — 83036 HEMOGLOBIN GLYCOSYLATED A1C: CPT | Performed by: NURSE PRACTITIONER

## 2024-04-15 PROCEDURE — 99214 OFFICE O/P EST MOD 30 MIN: CPT | Mod: 25 | Performed by: NURSE PRACTITIONER

## 2024-04-15 PROCEDURE — 36415 COLL VENOUS BLD VENIPUNCTURE: CPT | Performed by: NURSE PRACTITIONER

## 2024-04-15 PROCEDURE — 90472 IMMUNIZATION ADMIN EACH ADD: CPT | Performed by: NURSE PRACTITIONER

## 2024-04-15 PROCEDURE — 80048 BASIC METABOLIC PNL TOTAL CA: CPT | Performed by: NURSE PRACTITIONER

## 2024-04-15 PROCEDURE — 80061 LIPID PANEL: CPT | Performed by: NURSE PRACTITIONER

## 2024-04-15 PROCEDURE — 90746 HEPB VACCINE 3 DOSE ADULT IM: CPT | Performed by: NURSE PRACTITIONER

## 2024-04-15 RX ORDER — AMLODIPINE BESYLATE 5 MG/1
5 TABLET ORAL DAILY
Qty: 90 TABLET | Refills: 3 | Status: SHIPPED | OUTPATIENT
Start: 2024-04-15

## 2024-04-15 RX ORDER — FLUTICASONE PROPIONATE AND SALMETEROL 250; 50 UG/1; UG/1
1 POWDER RESPIRATORY (INHALATION) EVERY 12 HOURS
Qty: 60 EACH | Refills: 3 | Status: SHIPPED | OUTPATIENT
Start: 2024-04-15 | End: 2024-04-15

## 2024-04-15 RX ORDER — FLUTICASONE PROPIONATE AND SALMETEROL 250; 50 UG/1; UG/1
1 POWDER RESPIRATORY (INHALATION) EVERY 12 HOURS
Qty: 60 EACH | Refills: 3 | Status: SHIPPED | OUTPATIENT
Start: 2024-04-15 | End: 2024-08-08

## 2024-04-15 ASSESSMENT — PATIENT HEALTH QUESTIONNAIRE - PHQ9
10. IF YOU CHECKED OFF ANY PROBLEMS, HOW DIFFICULT HAVE THESE PROBLEMS MADE IT FOR YOU TO DO YOUR WORK, TAKE CARE OF THINGS AT HOME, OR GET ALONG WITH OTHER PEOPLE: SOMEWHAT DIFFICULT
SUM OF ALL RESPONSES TO PHQ QUESTIONS 1-9: 6
SUM OF ALL RESPONSES TO PHQ QUESTIONS 1-9: 6

## 2024-04-15 ASSESSMENT — PAIN SCALES - GENERAL: PAINLEVEL: NO PAIN (0)

## 2024-04-15 NOTE — NURSING NOTE
Prior to immunization administration, verified patients identity using patient s name and date of birth. Please see Immunization Activity for additional information.     Screening Questionnaire for Adult Immunization    Are you sick today?   No   Do you have allergies to medications, food, a vaccine component or latex?   No   Have you ever had a serious reaction after receiving a vaccination?   No   Do you have a long-term health problem with heart, lung, kidney, or metabolic disease (e.g., diabetes), asthma, a blood disorder, no spleen, complement component deficiency, a cochlear implant, or a spinal fluid leak?  Are you on long-term aspirin therapy?   No   Do you have cancer, leukemia, HIV/AIDS, or any other immune system problem?   No   Do you have a parent, brother, or sister with an immune system problem?   No   In the past 3 months, have you taken medications that affect  your immune system, such as prednisone, other steroids, or anticancer drugs; drugs for the treatment of rheumatoid arthritis, Crohn s disease, or psoriasis; or have you had radiation treatments?   No   Have you had a seizure, or a brain or other nervous system problem?   No   During the past year, have you received a transfusion of blood or blood    products, or been given immune (gamma) globulin or antiviral drug?   No   For women: Are you pregnant or is there a chance you could become       pregnant during the next month?   No   Have you received any vaccinations in the past 4 weeks?   No     Immunization questionnaire answers were all negative.      Patient instructed to remain in clinic for 15 minutes afterwards, and to report any adverse reactions.     Screening performed by Carmen Roman LPN on 4/15/2024 at 11:12 AM.

## 2024-04-15 NOTE — TELEPHONE ENCOUNTER
Alice Christensen Ea/Rm1 minute ago (4:19 PM)       Hi, this medication was denied. If the provider would like to appeal, please provide a letter of medical necessity  and route back to the team. Otherwise please notify the patient and close the encounter when finished. Thank you, Retail Pharmacy Prior Authorization Team

## 2024-04-15 NOTE — PROGRESS NOTES
Preventive Care Visit  [facility]  RUI Dickinson CNP, Family Medicine  Apr 15, 2024      Assessment & Plan     Screen for colon cancer  Due; agreeable to colonoscopy.   - Colonoscopy Screening  Referral; Future    Benign essential hypertension  Chronic, here today for follow-up.  BP is controlled.  Due for lab.  Will have him continue on current medication.   - BASIC METABOLIC PANEL; Future  - amLODIPine (NORVASC) 5 MG tablet; Take 1 tablet (5 mg) by mouth daily  - BASIC METABOLIC PANEL    Mild persistent asthma without complication  Chronic, here today for follow-up.  Asthma has been much better controlled since starting on advair.  Use of albuterol has decreased.  Continue on current medication.   - fluticasone-salmeterol (ADVAIR) 250-50 MCG/ACT inhaler; Inhale 1 puff into the lungs every 12 hours    Screening for hyperlipidemia  Chronic, had lipids checked with insurance and noted the LDL was higher than in the past.  Will check today with labs.    - Lipid panel reflex to direct LDL Fasting; Future    Elevated glucose  Noted on past labs, will add A1c today.   - Hemoglobin A1c; Future  - Hemoglobin A1c    Routine general medical examination at a health care facility  Reviewed age appropriate screenings and immunizations.      CARDIOVASCULAR SCREENING; LDL GOAL LESS THAN 160  Fasting   - Lipid panel reflex to direct LDL Fasting              BMI  Estimated body mass index is 30.52 kg/m  as calculated from the following:    Height as of this encounter: 1.829 m (6').    Weight as of this encounter: 102.1 kg (225 lb).   Weight management plan: Discussed healthy diet and exercise guidelines    Counseling  Appropriate preventive services were discussed with this patient, including applicable screening as appropriate for fall prevention, nutrition, physical activity, Tobacco-use cessation, weight loss and cognition.  Checklist reviewing preventive services available has been  given to the patient.  Reviewed patient's diet, addressing concerns and/or questions.   He is at risk for lack of exercise and has been provided with information to increase physical activity for the benefit of his well-being.   He is at risk for psychosocial distress and has been provided with information to reduce risk.   The patient's PHQ-9 score is consistent with mild depression. He was provided with information regarding depression.       Follow-up 1 year    Subjective   Westley is a 53 year old, presenting for the following:  Physical and Blood Draw (LABS: patient IS fasting)        4/15/2024    10:20 AM   Additional Questions   Roomed by Regine         4/15/2024    10:20 AM   Patient Reported Additional Medications   Patient reports taking the following new medications none        Health Care Directive  Patient does not have a Health Care Directive or Living Will: Discussed advance care planning with patient; however, patient declined at this time.    HPI      Hypertension Follow-up    Do you check your blood pressure regularly outside of the clinic? No   Are you following a low salt diet? Yes  Are your blood pressures ever more than 140 on the top number (systolic) OR more   than 90 on the bottom number (diastolic), for example 140/90? No  No CP, SOB, edema, and palpitations.   BP Readings from Last 6 Encounters:   04/15/24 125/79   03/28/24 (!) 129/96   01/04/24 117/84   12/27/23 120/70   10/10/23 (!) 118/90   07/18/23 100/65       Asthma Follow-Up    Was ACT completed today?  Yes        11/29/2023     1:32 PM 12/27/2023    11:54 AM 4/8/2024    10:24 AM   ACT Total Scores   ACT TOTAL SCORE (Goal Greater than or Equal to 20) 10 15 23   In the past 12 months, how many times did you visit the emergency room for your asthma without being admitted to the hospital? 0 0 0   In the past 12 months, how many times were you hospitalized overnight because of your asthma? 0 0 0   Much improved after starting back on  advair.      How many days per week do you miss taking your asthma controller medication?  0  Please describe any recent triggers for your asthma: upper respiratory infections, pollens, mold, and cold air  Have you had any Emergency Room Visits, Urgent Care Visits, or Hospital Admissions since your last office visit?  No        4/8/2024   General Health   How would you rate your overall physical health? Good   Feel stress (tense, anxious, or unable to sleep) Only a little   (!) STRESS CONCERN      4/8/2024   Nutrition   Three or more servings of calcium each day? Yes   Diet: Regular (no restrictions)   How many servings of fruit and vegetables per day? 4 or more   How many sweetened beverages each day? 0-1         4/8/2024   Exercise   Days per week of moderate/strenous exercise 3 days   Average minutes spent exercising at this level 30 min         4/8/2024   Social Factors   Frequency of gathering with friends or relatives Twice a week   Worry food won't last until get money to buy more No   Food not last or not have enough money for food? No   Do you have housing?  Yes   Are you worried about losing your housing? No   Lack of transportation? No   Unable to get utilities (heat,electricity)? No   Want help with housing or utility concern? No   (!) HOUSING CONCERN PRESENT      4/8/2024   Fall Risk   Fallen 2 or more times in the past year? No   Trouble with walking or balance? No          4/8/2024   Dental   Dentist two times every year? Yes         4/8/2024   TB Screening   Were you born outside of the [place]? No       Today's PHQ-9 Score:       4/15/2024    10:14 AM   PHQ-9 SCORE   PHQ-9 Total Score MyChart 6 (Mild depression)   PHQ-9 Total Score 6         4/8/2024   Substance Use   Alcohol more than 3/day or more than 7/wk No   Do you use any other substances recreationally? (!) ALCOHOL--no concerns      Social History     Tobacco Use    Smoking status: Former     Current packs/day: 0.00     Types: Cigarettes, Cigars  "    Start date: 2007     Quit date: 2016     Years since quittin.6     Passive exposure: Past    Smokeless tobacco: Never    Tobacco comments:     Originally smoked from  to , restarted again .   Vaping Use    Vaping status: Never Used   Substance Use Topics    Alcohol use: Yes     Comment: occasional use    Drug use: No           2024   STI Screening   New sexual partner(s) since last STI/HIV test? No   ASCVD Risk   The 10-year ASCVD risk score (Harvinder CASTELLANOS, et al., 2019) is: 2.6%    Values used to calculate the score:      Age: 53 years      Sex: Male      Is Non- : No      Diabetic: No      Tobacco smoker: No      Systolic Blood Pressure: 125 mmHg      Is BP treated: Yes      HDL Cholesterol: 75 mg/dL      Total Cholesterol: 169 mg/dL           Reviewed and updated as needed this visit by Provider                    Past Medical History:   Diagnosis Date    Anxiety     Asthma     Depression     Depressive disorder     Hypertension 2009    On the high end of \"normal\" 120/80s    Kidney infection 2009    Hospitalized x 2    Thyroid disease     Hashimoto's diagnosis      Past Surgical History:   Procedure Laterality Date    ENT SURGERY      nasal polyps times 2    GENITOURINARY SURGERY  2009    Vascetomy    HERNIA REPAIR      under 3 years old    MAMMOPLASTY REDUCTION  age 19     Labs reviewed in EPIC  BP Readings from Last 3 Encounters:   04/15/24 125/79   24 (!) 129/96   24 117/84    Wt Readings from Last 3 Encounters:   04/15/24 102.1 kg (225 lb)   23 105.1 kg (231 lb 12.8 oz)   23 95.3 kg (210 lb)                  Current Outpatient Medications   Medication Sig Dispense Refill    albuterol (PROAIR HFA/PROVENTIL HFA/VENTOLIN HFA) 108 (90 Base) MCG/ACT inhaler INHALE TWO PUFFS BY MOUT EVERY SIX HOURS AS NEEDED FOR SHORNTESS OF BREATH OF DYPSNEA 8.5 g 0    amitriptyline (ELAVIL) 50 MG tablet take 1-2 tablets by mouth at bedtime 60 " tablet 3    amLODIPine (NORVASC) 5 MG tablet TAKE ONE TABLET BY MOUTH ONE TIME DAILY 90 tablet 0    fluticasone-salmeterol (ADVAIR) 250-50 MCG/ACT inhaler Inhale 1 puff into the lungs every 12 hours 60 each 0    mometasone (NASONEX) 50 MCG/ACT nasal spray       rimegepant (NURTEC) 75 MG ODT tablet Place 1 tablet (75 mg) under the tongue daily as needed for migraine Maximum of 1 tablet every 24 hours. 8 tablet 0    rizatriptan (MAXALT) 10 MG tablet TAKE 0.5 TABLET BY MOUTH AT ONSET OF HEADACHE FOR MIGRAINE. MAY REPEAT IN 2 HOURS. MAX OF 3 TABLETS BY MOUTH IN 24 HOURS. 10 tablet 0    tadalafil (CIALIS) 10 MG tablet Take 1 tablet (10 mg) by mouth daily as needed Take 30 minutes prior to sexual activity, no more than once daily. 12 tablet 11    topiramate (TOPAMAX) 50 MG tablet TAKE ONE TABLET BY MOUTH TWICE DAILY 60 tablet 0     Allergies   Allergen Reactions    Aspirin     Cephalosporins     Penicillins             Objective    Exam  /79 (BP Location: Right arm, Patient Position: Sitting, Cuff Size: Adult Large)   Pulse 75   Temp 97.9  F (36.6  C) (Oral)   Resp 16   Ht 1.829 m (6')   Wt 102.1 kg (225 lb)   SpO2 98%   BMI 30.52 kg/m     Estimated body mass index is 30.52 kg/m  as calculated from the following:    Height as of this encounter: 1.829 m (6').    Weight as of this encounter: 102.1 kg (225 lb).    Physical Exam  GENERAL: alert and no distress  EYES: Eyes grossly normal to inspection, PERRL and conjunctivae and sclerae normal  HENT: ear canals and TM's normal, nose and mouth without ulcers or lesions  NECK: no adenopathy, no asymmetry, masses, or scars  RESP: lungs clear to auscultation - no rales, rhonchi or wheezes  CV: regular rate and rhythm, normal S1 S2, no S3 or S4, no murmur, click or rub, no peripheral edema  ABDOMEN: soft, nontender, no hepatosplenomegaly, no masses and bowel sounds normal  MS: no gross musculoskeletal defects noted, no edema  SKIN: no suspicious lesions or  niyah  NEURO: Normal strength and tone, mentation intact and speech normal  PSYCH: mentation appears normal, affect normal/bright        Signed Electronically by: RUI Dickinson CNP

## 2024-04-15 NOTE — PATIENT INSTRUCTIONS
Preventive Care Advice   This is general advice given by our system to help you stay healthy. However, your care team may have specific advice just for you. Please talk to your care team about your preventive care needs.  Nutrition  Eat 5 or more servings of fruits and vegetables each day.  Try wheat bread, brown rice and whole grain pasta (instead of white bread, rice, and pasta).  Get enough calcium and vitamin D. Check the label on foods and aim for 100% of the RDA (recommended daily allowance).  Lifestyle  Exercise at least 150 minutes each week   (30 minutes a day, 5 days a week).  Do muscle strengthening activities 2 days a week. These help control your weight and prevent disease.  No smoking.  Wear sunscreen to prevent skin cancer.  Have a dental exam and cleaning every 6 months.  Yearly exams  See your health care team every year to talk about:  Any changes in your health.  Any medicines your care team has prescribed.  Preventive care, family planning, and ways to prevent chronic diseases.  Shots (vaccines)   HPV shots (up to age 26), if you've never had them before.  Hepatitis B shots (up to age 59), if you've never had them before.  COVID-19 shot: Get this shot when it's due.  Flu shot: Get a flu shot every year.  Tetanus shot: Get a tetanus shot every 10 years.  Pneumococcal, hepatitis A, and RSV shots: Ask your care team if you need these based on your risk.  Shingles shot (for age 50 and up).  General health tests  Diabetes screening:  Starting at age 35, Get screened for diabetes at least every 3 years.  If you are younger than age 35, ask your care team if you should be screened for diabetes.  Cholesterol test: At age 39, start having a cholesterol test every 5 years, or more often if advised.  Bone density scan (DEXA): At age 50, ask your care team if you should have this scan for osteoporosis (brittle bones).  Hepatitis C: Get tested at least once in your life.  STIs (sexually transmitted  infections)  Before age 24: Ask your care team if you should be screened for STIs.  After age 24: Get screened for STIs if you're at risk. You are at risk for STIs (including HIV) if:  You are sexually active with more than one person.  You don't use condoms every time.  You or a partner was diagnosed with a sexually transmitted infection.  If you are at risk for HIV, ask about PrEP medicine to prevent HIV.  Get tested for HIV at least once in your life, whether you are at risk for HIV or not.  Cancer screening tests  Cervical cancer screening: If you have a cervix, begin getting regular cervical cancer screening tests at age 21. Most people who have regular screenings with normal results can stop after age 65. Talk about this with your provider.  Breast cancer scan (mammogram): If you've ever had breasts, begin having regular mammograms starting at age 40. This is a scan to check for breast cancer.  Colon cancer screening: It is important to start screening for colon cancer at age 45.  Have a colonoscopy test every 10 years (or more often if you're at risk) Or, ask your provider about stool tests like a FIT test every year or Cologuard test every 3 years.  To learn more about your testing options, visit: https://www.VisuMotion/112337.pdf.  For help making a decision, visit: https://bit.ly/og16378.  Prostate cancer screening test: If you have a prostate and are age 55 to 69, ask your provider if you would benefit from a yearly prostate cancer screening test.  Lung cancer screening: If you are a current or former smoker age 50 to 80, ask your care team if ongoing lung cancer screenings are right for you.  For informational purposes only. Not to replace the advice of your health care provider. Copyright   2023 [employer]. All rights reserved. Clinically reviewed by the [facility]itions Program. [employer] [id number] - REV 01/24.    Learning About Stress  What is stress?     Stress is your  body's response to a hard situation. Your body can have a physical, emotional, or mental response. Stress is a fact of life for most people, and it affects everyone differently. What causes stress for you may not be stressful for someone else.  A lot of things can cause stress. You may feel stress when you go on a job interview, take a test, or run a race. This kind of short-term stress is normal and even useful. It can help you if you need to work hard or react quickly. For example, stress can help you finish an important job on time.  Long-term stress is caused by ongoing stressful situations or events. Examples of long-term stress include long-term health problems, ongoing problems at work, or conflicts in your family. Long-term stress can harm your health.  How does stress affect your health?  When you are stressed, your body responds as though you are in danger. It makes hormones that speed up your heart, make you breathe faster, and give you a burst of energy. This is called the fight-or-flight stress response. If the stress is over quickly, your body goes back to normal and no harm is done.  But if stress happens too often or lasts too long, it can have bad effects. Long-term stress can make you more likely to get sick, and it can make symptoms of some diseases worse. If you tense up when you are stressed, you may develop neck, shoulder, or low back pain. Stress is linked to high blood pressure and heart disease.  Stress also harms your emotional health. It can make you tobar, tense, or depressed. Your relationships may suffer, and you may not do well at work or school.  What can you do to manage stress?  You can try these things to help manage stress:   Do something active. Exercise or activity can help reduce stress. Walking is a great way to get started. Even everyday activities such as housecleaning or yard work can help.  Try yoga or julissa chi. These techniques combine exercise and meditation. You may need  some training at first to learn them.  Do something you enjoy. For example, listen to music or go to a movie. Practice your hobby or do volunteer work.  Meditate. This can help you relax, because you are not worrying about what happened before or what may happen in the future.  Do guided imagery. Imagine yourself in any setting that helps you feel calm. You can use online videos, books, or a teacher to guide you.  Do breathing exercises. For example:  From a standing position, bend forward from the waist with your knees slightly bent. Let your arms dangle close to the floor.  Breathe in slowly and deeply as you return to a standing position. Roll up slowly and lift your head last.  Hold your breath for just a few seconds in the standing position.  Breathe out slowly and bend forward from the waist.  Let your feelings out. Talk, laugh, cry, and express anger when you need to. Talking with supportive friends or family, a counselor, or a george leader about your feelings is a healthy way to relieve stress. Avoid discussing your feelings with people who make you feel worse.  Write. It may help to write about things that are bothering you. This helps you find out how much stress you feel and what is causing it. When you know this, you can find better ways to cope.  What can you do to prevent stress?  You might try some of these things to help prevent stress:  Manage your time. This helps you find time to do the things you want and need to do.  Get enough sleep. Your body recovers from the stresses of the day while you are sleeping.  Get support. Your family, friends, and community can make a difference in how you experience stress.  Limit your news feed. Avoid or limit time on social media or news that may make you feel stressed.  Do something active. Exercise or activity can help reduce stress. Walking is a great way to get started.  Where can you learn more?  Go to https://www.healthwise.net/patiented  Enter N032 in the  "search box to learn more about \"Learning About Stress.\"  Current as of: October 24, 2023               Content Version: 14.0    3265-8787 [employer].   Care instructions adapted under license by your healthcare professional. If you have questions about a medical condition or this instruction, always ask your healthcare professional. [employer] disclaims any warranty or liability for your use of this information.      Learning About Depression Screening  What is depression screening?  Depression screening is a way to see if you have depression symptoms. It may be done by a doctor or counselor. It's often part of a routine checkup. That's because your mental health is just as important as your physical health.  Depression is a mental health condition that affects how you feel, think, and act. You may:  Have less energy.  Lose interest in your daily activities.  Feel sad and grouchy for a long time.  Depression is very common. It affects people of all ages.  Many things can lead to depression. Some people become depressed after they have a stroke or find out they have a major illness like cancer or heart disease. The death of a loved one or a breakup may lead to depression. It can run in families. Most experts believe that a combination of inherited genes and stressful life events can cause it.  What happens during screening?  You may be asked to fill out a form about your depression symptoms. You and the doctor will discuss your answers. The doctor may ask you more questions to learn more about how you think, act, and feel.  What happens after screening?  If you have symptoms of depression, your doctor will talk to you about your options.  Doctors usually treat depression with medicines or counseling. Often, combining the two works best. Many people don't get help because they think that they'll get over the depression on their own. But people with depression may not get better unless they " "get treatment.  The cause of depression is not well understood. There may be many factors involved. But if you have depression, it's not your fault.  A serious symptom of depression is thinking about death or suicide. If you or someone you care about talks about this or about feeling hopeless, get help right away.  It's important to know that depression can be treated. Medicine, counseling, and self-care may help.  Where can you learn more?  Go to https://www.[employer].net/patiented  Enter T185 in the search box to learn more about \"Learning About Depression Screening.\"  Current as of: June 24, 2023               Content Version: 14.0    2033-6842 [employer].   Care instructions adapted under license by your healthcare professional. If you have questions about a medical condition or this instruction, always ask your healthcare professional. [employer] disclaims any warranty or liability for your use of this information.      "

## 2024-04-15 NOTE — TELEPHONE ENCOUNTER
Retail Pharmacy Prior Authorization Team   Phone: [phone]    PRIOR AUTHORIZATION DENIED    Medication: FLUTICASONE-SALMETEROL 250-50 MCG/ACT IN AEPB  Insurance Company: [employer] - Phone [phone] Fax [phone]  Denial Date: 4/15/2024  Denial Reason(s): MUST TRY/FAIL TWO PREFERRED ALTERNATIVES - FLUTICASONE-SALMETEROL HFA, WIXELA INHUB, AND BREO ELLIPTA      Appeal Information: IF THE PROVIDER WOULD LIKE TO APPEAL THIS DECISION PLEASE PROVIDE THE PA TEAM WITH A LETTER OF MEDICAL NECESSITY      Patient Notified: NO

## 2024-04-16 LAB
ANION GAP SERPL CALCULATED.3IONS-SCNC: 9 MMOL/L (ref 7–15)
BUN SERPL-MCNC: 13.2 MG/DL (ref 6–20)
CALCIUM SERPL-MCNC: 9.2 MG/DL (ref 8.6–10)
CHLORIDE SERPL-SCNC: 108 MMOL/L (ref 98–107)
CHOLEST SERPL-MCNC: 153 MG/DL
CREAT SERPL-MCNC: 0.86 MG/DL (ref 0.67–1.17)
DEPRECATED HCO3 PLAS-SCNC: 23 MMOL/L (ref 22–29)
EGFRCR SERPLBLD CKD-EPI 2021: >90 ML/MIN/1.73M2
FASTING STATUS PATIENT QL REPORTED: YES
GLUCOSE SERPL-MCNC: 94 MG/DL (ref 70–99)
HDLC SERPL-MCNC: 51 MG/DL
LDLC SERPL CALC-MCNC: 90 MG/DL
NONHDLC SERPL-MCNC: 102 MG/DL
POTASSIUM SERPL-SCNC: 4.6 MMOL/L (ref 3.4–5.3)
SODIUM SERPL-SCNC: 140 MMOL/L (ref 135–145)
TRIGL SERPL-MCNC: 62 MG/DL

## 2024-04-17 RX ORDER — FLUTICASONE PROPIONATE AND SALMETEROL XINAFOATE 115; 21 UG/1; UG/1
2 AEROSOL, METERED RESPIRATORY (INHALATION) 2 TIMES DAILY
Qty: 12 G | Refills: 3 | Status: SHIPPED | OUTPATIENT
Start: 2024-04-17 | End: 2024-09-16

## 2024-04-17 NOTE — TELEPHONE ENCOUNTER
Insurance wants to switch from advair diskus to advair hfa.  New inhaler will be same med, but 2 puffs two times daily.  Please let him know inhaler med will be the same but dose is different and inhaler will look different.    Abby Fuentes CNP

## 2024-04-19 DIAGNOSIS — G43.719 CHRONIC MIGRAINE WITHOUT AURA, INTRACTABLE, WITHOUT STATUS MIGRAINOSUS: Primary | ICD-10-CM

## 2024-05-05 DIAGNOSIS — G43.719 CHRONIC MIGRAINE WITHOUT AURA, INTRACTABLE, WITHOUT STATUS MIGRAINOSUS: ICD-10-CM

## 2024-05-09 ENCOUNTER — TELEPHONE (OUTPATIENT)
Dept: FAMILY MEDICINE | Facility: CLINIC | Age: 54
End: 2024-05-09
Payer: COMMERCIAL

## 2024-05-09 ENCOUNTER — MYC MEDICAL ADVICE (OUTPATIENT)
Dept: FAMILY MEDICINE | Facility: CLINIC | Age: 54
End: 2024-05-09
Payer: COMMERCIAL

## 2024-05-09 NOTE — TELEPHONE ENCOUNTER
PA needed for fluticasone-salmeterol (ADVAIR HFA) 115-21 MCG/ACT inhaler     Marta Silva RN on 5/9/2024 at 10:21 AM]

## 2024-05-12 DIAGNOSIS — G43.719 CHRONIC MIGRAINE WITHOUT AURA, INTRACTABLE, WITHOUT STATUS MIGRAINOSUS: ICD-10-CM

## 2024-05-15 NOTE — TELEPHONE ENCOUNTER
Rizatriptan Benzoate Oral Tablet 10 MG      Last Written Prescription Date:  4/5/24  Last Fill Quantity: 10,   # refills: 0  Last Office Visit : 3/28/24  Future Office visit:  6/20/24    Routing refill request to provider for review/approval because:  Limited quantity and no refills with last refill  Requires provider review

## 2024-05-16 ENCOUNTER — MYC MEDICAL ADVICE (OUTPATIENT)
Dept: FAMILY MEDICINE | Facility: CLINIC | Age: 54
End: 2024-05-16
Payer: COMMERCIAL

## 2024-05-16 DIAGNOSIS — J45.30 MILD PERSISTENT ASTHMA WITHOUT COMPLICATION: Primary | ICD-10-CM

## 2024-05-16 NOTE — TELEPHONE ENCOUNTER
See ,  Called [facility] to determine if there are any options for pt.  Say, Pharmacy Tech.  Out of pocket for prescription would be $394.24 cash price, if [employer] member then $321.99.  Unable to do emergency supply for inhaler.    Spoke to Pharmacist, Filomena.  Good RX website would be an option.  They still have diskus prescription on file and could try running that through coupon until further notice on the PA for the HFA.    Marlena Diehl RN, BSN  [facility]

## 2024-05-16 NOTE — TELEPHONE ENCOUNTER
Called PA team, spoke to Amadou.  Due to pt almost out of medication, she will submit today.  Should have determination in 3 business days.    Marlena Diehl RN, BSN  [facility]

## 2024-05-16 NOTE — TELEPHONE ENCOUNTER
Refill request for rizatriptan (MAXALT) 10 MG tablet   Last follow-up 3/28/24; next follow-up 6/20/24  Medication T'd for review and signature  Hilario MANN ATC on 5/16/2024 at 9:42 AM    rizatriptan (MAXALT) 10 MG tablet 10 tablet 0 4/5/2024 -- No   Sig: TAKE 0.5 TABLET BY MOUTH AT ONSET OF HEADACHE FOR MIGRAINE. MAY REPEAT IN 2 HOURS. MAX OF 3 TABLETS BY MOUTH IN 24 HOURS.

## 2024-05-17 DIAGNOSIS — G43.719 CHRONIC MIGRAINE WITHOUT AURA, INTRACTABLE, WITHOUT STATUS MIGRAINOSUS: ICD-10-CM

## 2024-05-17 RX ORDER — FLUTICASONE PROPIONATE AND SALMETEROL 250; 50 UG/1; UG/1
1 POWDER RESPIRATORY (INHALATION) EVERY 12 HOURS
Qty: 60 EACH | Refills: 0 | Status: SHIPPED | OUTPATIENT
Start: 2024-05-17 | End: 2024-06-20

## 2024-05-17 RX ORDER — RIZATRIPTAN BENZOATE 10 MG/1
TABLET ORAL
Qty: 10 TABLET | Refills: 3 | Status: SHIPPED | OUTPATIENT
Start: 2024-05-17 | End: 2024-09-16

## 2024-05-17 RX ORDER — TOPIRAMATE 50 MG/1
50 TABLET, FILM COATED ORAL 2 TIMES DAILY
Qty: 60 TABLET | Refills: 0 | Status: SHIPPED | OUTPATIENT
Start: 2024-05-17 | End: 2024-06-26

## 2024-05-17 RX ORDER — RIZATRIPTAN BENZOATE 10 MG/1
TABLET ORAL
Qty: 10 TABLET | OUTPATIENT
Start: 2024-05-17

## 2024-05-17 NOTE — TELEPHONE ENCOUNTER
Retail Pharmacy Prior Authorization Team   Phone: [phone]      Prior Authorization Approval    Medication: FLUTICASONE-SALMETEROL 115-21 MCG/ACT IN AERO  Authorization Effective Date: 5/17/2024  Authorization Expiration Date: 5/17/2025  Approved Dose/Quantity:   Reference #:     Insurance Company: [employer] - Phone [phone] Fax [phone]  Expected CoPay: $    CoPay Card Available: No    Financial Assistance Needed:   Which Pharmacy is filling the prescription: [id number] - [place], [place] - [address]  Pharmacy Notified: No  Patient Notified: No    Not sure if Provider will want patient to use the Advair HFA or Advair Diskus since patient requested New Rx for the Diskus as they will use a GoodRx Coupon.

## 2024-05-17 NOTE — TELEPHONE ENCOUNTER
Refill request received from [facility]    Medication: topiramate (TOPAMAX) 50 MG tablet   Directions: TAKE ONE TABLET BY MOUTH TWICE DAILY      Last ordered: 4/11/24   Qty: #60  RF: 0  Last OV: 3/28/24  Next OV: 6/20/24    Routing to Dr. Long to review and sign if appropriate.    Violet rGimes, RN Care Coordinator  [facility]   [facility] main phone # [phone] fax # [phone]

## 2024-05-17 NOTE — TELEPHONE ENCOUNTER
Retail Pharmacy Prior Authorization Team   Phone: [phone]      PA Initiation    Medication: FLUTICASONE-SALMETEROL 115-21 MCG/ACT IN AERO  Insurance Company: [employer] PA's - Phone [phone] Fax [phone]  Pharmacy Filling the Rx:    Filling Pharmacy Phone:    Filling Pharmacy Fax:    Start Date: 5/16/2024

## 2024-05-17 NOTE — TELEPHONE ENCOUNTER
Refill request for the following medication (s) listed below.    Pending Prescriptions:                       Disp   Refills    rizatriptan (MAXALT) 10 MG tablet         10 tab*3            Sig: TAKE 0.5 TABLET BY MOUTH AT ONSET OF HEADACHE FOR           MIGRAINE. MAY REPEAT IN 2 HOURS. MAX OF 3 TABLETS           BY MOUTH IN 24 HOURS.      Last office visit provider:  03/28/24  Next appointment scheduled: 06/20/24      Medication T'd for review and signature  Stefani Dominguez MA on 5/17/2024 at 2:18 PM

## 2024-05-20 ENCOUNTER — ALLIED HEALTH/NURSE VISIT (OUTPATIENT)
Dept: FAMILY MEDICINE | Facility: CLINIC | Age: 54
End: 2024-05-20
Payer: COMMERCIAL

## 2024-05-20 DIAGNOSIS — Z23 ENCOUNTER FOR IMMUNIZATION: Primary | ICD-10-CM

## 2024-05-20 PROCEDURE — 99207 PR NO CHARGE NURSE ONLY: CPT

## 2024-05-20 PROCEDURE — 90471 IMMUNIZATION ADMIN: CPT

## 2024-05-20 PROCEDURE — 90746 HEPB VACCINE 3 DOSE ADULT IM: CPT

## 2024-05-20 NOTE — PROGRESS NOTES
Prior to immunization administration, verified patients identity using patient s name and date of birth. Please see Immunization Activity for additional information.     Screening Questionnaire for Adult Immunization    Are you sick today?   No   Do you have allergies to medications, food, a vaccine component or latex?   No   Have you ever had a serious reaction after receiving a vaccination?   No   Do you have a long-term health problem with heart, lung, kidney, or metabolic disease (e.g., diabetes), asthma, a blood disorder, no spleen, complement component deficiency, a cochlear implant, or a spinal fluid leak?  Are you on long-term aspirin therapy?   No   Do you have cancer, leukemia, HIV/AIDS, or any other immune system problem?   No   Do you have a parent, brother, or sister with an immune system problem?   No   In the past 3 months, have you taken medications that affect  your immune system, such as prednisone, other steroids, or anticancer drugs; drugs for the treatment of rheumatoid arthritis, Crohn s disease, or psoriasis; or have you had radiation treatments?   No   Have you had a seizure, or a brain or other nervous system problem?   No   During the past year, have you received a transfusion of blood or blood    products, or been given immune (gamma) globulin or antiviral drug?   No   For women: Are you pregnant or is there a chance you could become       pregnant during the next month?   No   Have you received any vaccinations in the past 4 weeks?   No     Immunization questionnaire answers were all negative.    I have reviewed the following standing orders:   This patient is due and qualifies for the Hepatitis B vaccine.    Click here for Hepatitis B Standing Order    I have reviewed the vaccines inclusion and exclusion criteria; No concerns regarding eligibility.     Patient instructed to remain in clinic for 15 minutes afterwards, and to report any adverse reactions.     Screening performed by Carmen  BERKLEY Roman on 5/20/2024 at 9:07 AM.

## 2024-06-17 DIAGNOSIS — G43.719 CHRONIC MIGRAINE WITHOUT AURA, INTRACTABLE, WITHOUT STATUS MIGRAINOSUS: ICD-10-CM

## 2024-06-19 NOTE — TELEPHONE ENCOUNTER
Refill request received from [facility]    Medication: topiramate (TOPAMAX) 50 MG tablet   Directions: TAKE ONE TABLET BY MOUTH TWICE DAILY      Last ordered: 5/17/24   Qty: #60  RF: 0  Last OV: 3/28/24  Next OV: 6/20/24    Routing to Dr. Long to review and sign if appropriate.    Violet Grimes, RN Care Coordinator  [facility]   [facility] main phone # [phone] fax # [phone]

## 2024-06-20 ENCOUNTER — OFFICE VISIT (OUTPATIENT)
Dept: PHYSICAL MEDICINE AND REHAB | Facility: CLINIC | Age: 54
End: 2024-06-20
Payer: COMMERCIAL

## 2024-06-20 VITALS — HEART RATE: 92 BPM | SYSTOLIC BLOOD PRESSURE: 117 MMHG | DIASTOLIC BLOOD PRESSURE: 79 MMHG

## 2024-06-20 DIAGNOSIS — G43.719 CHRONIC MIGRAINE WITHOUT AURA, INTRACTABLE, WITHOUT STATUS MIGRAINOSUS: ICD-10-CM

## 2024-06-20 PROCEDURE — 64615 CHEMODENERV MUSC MIGRAINE: CPT | Performed by: PHYSICAL MEDICINE & REHABILITATION

## 2024-06-20 RX ORDER — BUPIVACAINE HYDROCHLORIDE 2.5 MG/ML
3 INJECTION, SOLUTION EPIDURAL; INFILTRATION; INTRACAUDAL ONCE
Status: COMPLETED | OUTPATIENT
Start: 2024-06-20 | End: 2024-06-20

## 2024-06-20 RX ADMIN — BUPIVACAINE HYDROCHLORIDE 7.5 MG: 2.5 INJECTION, SOLUTION EPIDURAL; INFILTRATION; INTRACAUDAL at 10:29

## 2024-06-20 NOTE — PROGRESS NOTES
[facility]    PM&R CLINIC NOTE  BOTULINUM TOXIN PROCEDURE      HPI  Chief Complaint   Patient presents with    Procedure     Botox     Westley Singh is a 54 year old male with a history of post concussion syndrome and intractable migraine headaches who presents to clinic for botulinum toxin injections for management of chronic migraine headaches.     SINCE LAST VISIT  Westley Singh was last seen here in clinic on 3/28/2024, at which time he received 155 units of Botox.     Patient denies new medical diagnoses, illnesses, hospitalizations, emergency room visits, and injuries since the previous injection with botulinum neurotoxin.       RESPONSE TO PREVIOUS TREATMENT    Side effects: No problems reported.  Post-procedural headache: Yes. He did get a headache that afternoon, which lasted into the next day.     1.  Headache frequency during this injection cycle:  Overall, he typically experiences about 12 headache days per month, but this past month he has only had about two. This is compared to his baseline headache frequency of 30 headache days per month.     2.  Headache duration during this injection cycle:  Headache duration ranged from 2 hours to several days. Patient reports one episodes of multiple day headaches during this injection cycle.     3.  Headache intensity during this injection cycle:    A.  3/10  =  Typical pain level.  B.  7/10  =  Worst pain level.  C.  0/10  =  Lowest pain level.    4.  Change in headache medication usage during this injection cycle:  (For Example:  Able to decrease use of oral pain medications.) He has been using Nurtec as needed for migraine rescue, and this works exceptionally well. He uses Tylenol for the more mild headaches.     5.  ER Visits During This Injection Cycle:  None.     6.  Functional Performance:  Change in ADL's, social interaction, days lost from work, etc. Patient reports being able to more fully participate in social and family  activities and responsibilities as headache symptoms have improved      PHYSICAL EXAM  VS: /79 (BP Location: Right arm, Patient Position: Sitting)   Pulse 92    GEN: Pleasant and cooperative, in no acute distress  HEENT: No facial asymmetry    ALLERGIES  Allergies   Allergen Reactions    Aspirin     Cephalosporins     Penicillins        CURRENT MEDICATIONS    Current Outpatient Medications:     albuterol (PROAIR HFA/PROVENTIL HFA/VENTOLIN HFA) 108 (90 Base) MCG/ACT inhaler, INHALE TWO PUFFS BY MOUT EVERY SIX HOURS AS NEEDED FOR SHORNTESS OF BREATH OF DYPSNEA, Disp: 8.5 g, Rfl: 0    amitriptyline (ELAVIL) 50 MG tablet, take 1-2 tablets by mouth at bedtime, Disp: 60 tablet, Rfl: 3    amLODIPine (NORVASC) 5 MG tablet, Take 1 tablet (5 mg) by mouth daily, Disp: 90 tablet, Rfl: 3    fluticasone-salmeterol (ADVAIR HFA) 115-21 MCG/ACT inhaler, Inhale 2 puffs into the lungs 2 times daily, Disp: 12 g, Rfl: 3    fluticasone-salmeterol (ADVAIR) 250-50 MCG/ACT inhaler, Inhale 1 puff into the lungs every 12 hours, Disp: 60 each, Rfl: 3    mometasone (NASONEX) 50 MCG/ACT nasal spray, , Disp: , Rfl:     rimegepant (NURTEC) 75 MG ODT tablet, Place 1 tablet (75 mg) under the tongue daily as needed for migraine Maximum of 1 tablet every 24 hours., Disp: 8 tablet, Rfl: 0    rizatriptan (MAXALT) 10 MG tablet, TAKE 0.5 TABLET BY MOUTH AT ONSET OF HEADACHE FOR MIGRAINE. MAY REPEAT IN 2 HOURS. MAX OF 3 TABLETS BY MOUTH IN 24 HOURS., Disp: 10 tablet, Rfl: 3    tadalafil (CIALIS) 10 MG tablet, Take 1 tablet (10 mg) by mouth daily as needed Take 30 minutes prior to sexual activity, no more than once daily., Disp: 12 tablet, Rfl: 11    topiramate (TOPAMAX) 50 MG tablet, TAKE ONE TABLET BY MOUTH TWICE DAILY, Disp: 60 tablet, Rfl: 0    fluticasone-salmeterol (ADVAIR) 250-50 MCG/ACT inhaler, Inhale 1 puff into the lungs every 12 hours, Disp: 60 each, Rfl: 0    Current Facility-Administered Medications:     botulinum toxin type A (BOTOX)  100 units injection 155 Units, 155 Units, Intramuscular, Q90 Days, Kim Long MD       BOTULINUM NEUROTOXIN INJECTION PROCEDURES    VERIFICATION OF PATIENT IDENTIFICATION AND PROCEDURE     Initials   Patient Name SES   Patient  SES   Procedure Verified by: SES     Prior to the start of the procedure and with procedural staff participation, I verbally confirmed the patient s identity using two indicators, relevant allergies, that the procedure was appropriate and matched the consent or emergent situation, and that the correct equipment/implants were available. Immediately prior to starting the procedure I conducted the Time Out with the procedural staff and re-confirmed the patient s name, procedure, and site/side. (The Joint Commission universal protocol was followed.)  Yes    Sedation (Moderate or Deep): None    ABOVE ASSESSMENTS PERFORMED BY    Kim Long MD      INDICATIONS FOR PROCEDURES  Westley Singh is a 54 year old patient with chronic migraine headaches which have been recalcitrant to oral medications and conservative therapy.  He is here today for injection with Botox.    GOAL OF PROCEDURE  The goal of this procedure is to increase active range of motion, improve volitional motor control and decrease pain .      TOTAL DOSE ADMINISTERED  Dose Administered:  155 units  Botox (Botulinum Toxin Type A)       2:1 Dilution   Unavoidable Drug Waste: Yes  Amount of drug waste (mL): 45 units Botox.  Reason for waste:  Single use vial  Diluent Used:  0.25% bupivacaine  Total Volume of Diluent Used:  3 ml  NDC #: Botox 100u ([address]-33)      CONSENT  The risks, benefits, and treatment options were discussed with Westley Singh and he agreed to proceed.    Written consent was obtained by [facility].     EQUIPMENT USED  Needle-30 gauge    SKIN PREPARATION  Skin preparation was performed using an alcohol wipe.    GUIDANCE DESCRIPTION  Guidance was not utilized for this procedure       AREA/MUSCLE  INJECTED: 155 UNITS BOTOX = TOTAL DOSE, 2:1 DILUTION  1. NECK & SHOULDER GIRDLE MUSCLES: 35 UNITS BOTOX = TOTAL DOSE, 2:1 DILUTION  Right Upper Trapezius (upper, mid & low cervical) - 7.5 units of Botox at 3 site/s.   Left Upper Trapezius (upper, mid & low cervical) - 7.5 units of Botox at 3 site/s.     Right Splenius - 5 units of Botox at 1 site/s.   Left Splenius - 5 units of Botox at 1 site/s.     Right Levator Scapulae - 5 units of Botox at 1 site/s.   Left Levator Scapulae - 5 units of Botox at 1 site/s.    3. FACIAL & SCALP MUSCLES: 120 UNITS BOTOX = TOTAL DOSE, 2:1 DILUTION  Right Frontalis - 10 units of Botox at 2 site/s.  Left Frontalis - 10 units of Botox at 2 site/s.    Right Temporalis - 25 units of Botox at 5 site/s.  Left Temporalis - 25 units of Botox at 5 site/s.    Right Occipitalis - 20 units of Botox at 4 site/s.   Left Occipitalis - 20 units of Botox at 4 site/s.     Right  - 2.5 units of Botox at 1 site/s.   Left  - 2.5 units of Botox at 1 site/s.    Procerus - 5 units of Botox at 1 site/s.       RESPONSE TO PROCEDURE  Westley Singh tolerated the procedure well and there were no immediate complications. He was allowed to recover for an appropriate period of time and was discharged home in stable condition.    ASSESSMENT AND PLAN   Botulinum toxin injections: No changes made to Botox dose or distribution today. Patient will continue to monitor response to Botox and report at next appointment.    Medications: Refills for Nurtec sent to pharmacy to be used as a migraine rescue.   Referrals: None.   Follow up: Westley Singh was rescheduled for the next series of injections in 12 weeks, at which time we will evaluate response to today's injections. he may call the clinic prior with any questions or concerns prior to the next appointment.

## 2024-06-20 NOTE — NURSING NOTE
Chief Complaint   Patient presents with    Procedure     Xochitl Dominguez MA on 6/20/2024 at 9:12 AM

## 2024-06-20 NOTE — LETTER
6/20/2024      Westley Singh  [id number] [address] [facility] [address]      Dear Colleague,    Thank you for referring your patient, Westley Sinhg, to the [facility]. Please see a copy of my visit note below.      [facility]    PM&R CLINIC NOTE  BOTULINUM TOXIN PROCEDURE      HPI  Chief Complaint   Patient presents with     Procedure     Botox     Westley Singh is a 54 year old male with a history of post concussion syndrome and intractable migraine headaches who presents to clinic for botulinum toxin injections for management of chronic migraine headaches.     SINCE LAST VISIT  Westley Singh was last seen here in clinic on 3/28/2024, at which time he received 155 units of Botox.     Patient denies new medical diagnoses, illnesses, hospitalizations, emergency room visits, and injuries since the previous injection with botulinum neurotoxin.       RESPONSE TO PREVIOUS TREATMENT    Side effects: No problems reported.  Post-procedural headache: Yes. He did get a headache that afternoon, which lasted into the next day.     1.  Headache frequency during this injection cycle:  Overall, he typically experiences about 12 headache days per month, but this past month he has only had about two. This is compared to his baseline headache frequency of 30 headache days per month.     2.  Headache duration during this injection cycle:  Headache duration ranged from 2 hours to several days. Patient reports one episodes of multiple day headaches during this injection cycle.     3.  Headache intensity during this injection cycle:    A.  3/10  =  Typical pain level.  B.  7/10  =  Worst pain level.  C.  0/10  =  Lowest pain level.    4.  Change in headache medication usage during this injection cycle:  (For Example:  Able to decrease use of oral pain medications.) He has been using Nurtec as needed for migraine rescue, and this works exceptionally well. He uses Tylenol for the more  mild headaches.     5.  ER Visits During This Injection Cycle:  None.     6.  Functional Performance:  Change in ADL's, social interaction, days lost from work, etc. Patient reports being able to more fully participate in social and family activities and responsibilities as headache symptoms have improved      PHYSICAL EXAM  VS: /79 (BP Location: Right arm, Patient Position: Sitting)   Pulse 92    GEN: Pleasant and cooperative, in no acute distress  HEENT: No facial asymmetry    ALLERGIES  Allergies   Allergen Reactions     Aspirin      Cephalosporins      Penicillins        CURRENT MEDICATIONS    Current Outpatient Medications:      albuterol (PROAIR HFA/PROVENTIL HFA/VENTOLIN HFA) 108 (90 Base) MCG/ACT inhaler, INHALE TWO PUFFS BY MOUT EVERY SIX HOURS AS NEEDED FOR SHORNTESS OF BREATH OF DYPSNEA, Disp: 8.5 g, Rfl: 0     amitriptyline (ELAVIL) 50 MG tablet, take 1-2 tablets by mouth at bedtime, Disp: 60 tablet, Rfl: 3     amLODIPine (NORVASC) 5 MG tablet, Take 1 tablet (5 mg) by mouth daily, Disp: 90 tablet, Rfl: 3     fluticasone-salmeterol (ADVAIR HFA) 115-21 MCG/ACT inhaler, Inhale 2 puffs into the lungs 2 times daily, Disp: 12 g, Rfl: 3     fluticasone-salmeterol (ADVAIR) 250-50 MCG/ACT inhaler, Inhale 1 puff into the lungs every 12 hours, Disp: 60 each, Rfl: 3     mometasone (NASONEX) 50 MCG/ACT nasal spray, , Disp: , Rfl:      rimegepant (NURTEC) 75 MG ODT tablet, Place 1 tablet (75 mg) under the tongue daily as needed for migraine Maximum of 1 tablet every 24 hours., Disp: 8 tablet, Rfl: 0     rizatriptan (MAXALT) 10 MG tablet, TAKE 0.5 TABLET BY MOUTH AT ONSET OF HEADACHE FOR MIGRAINE. MAY REPEAT IN 2 HOURS. MAX OF 3 TABLETS BY MOUTH IN 24 HOURS., Disp: 10 tablet, Rfl: 3     tadalafil (CIALIS) 10 MG tablet, Take 1 tablet (10 mg) by mouth daily as needed Take 30 minutes prior to sexual activity, no more than once daily., Disp: 12 tablet, Rfl: 11     topiramate (TOPAMAX) 50 MG tablet, TAKE ONE TABLET  BY MOUTH TWICE DAILY, Disp: 60 tablet, Rfl: 0     fluticasone-salmeterol (ADVAIR) 250-50 MCG/ACT inhaler, Inhale 1 puff into the lungs every 12 hours, Disp: 60 each, Rfl: 0    Current Facility-Administered Medications:      botulinum toxin type A (BOTOX) 100 units injection 155 Units, 155 Units, Intramuscular, Q90 Days, Kim Long MD       BOTULINUM NEUROTOXIN INJECTION PROCEDURES    VERIFICATION OF PATIENT IDENTIFICATION AND PROCEDURE     Initials   Patient Name SES   Patient  SES   Procedure Verified by: SES     Prior to the start of the procedure and with procedural staff participation, I verbally confirmed the patient s identity using two indicators, relevant allergies, that the procedure was appropriate and matched the consent or emergent situation, and that the correct equipment/implants were available. Immediately prior to starting the procedure I conducted the Time Out with the procedural staff and re-confirmed the patient s name, procedure, and site/side. (The Joint Commission universal protocol was followed.)  Yes    Sedation (Moderate or Deep): None    ABOVE ASSESSMENTS PERFORMED BY    Kim Long MD      INDICATIONS FOR PROCEDURES  Westley Singh is a 54 year old patient with chronic migraine headaches which have been recalcitrant to oral medications and conservative therapy.  He is here today for injection with Botox.    GOAL OF PROCEDURE  The goal of this procedure is to increase active range of motion, improve volitional motor control and decrease pain .      TOTAL DOSE ADMINISTERED  Dose Administered:  155 units  Botox (Botulinum Toxin Type A)       2:1 Dilution   Unavoidable Drug Waste: Yes  Amount of drug waste (mL): 45 units Botox.  Reason for waste:  Single use vial  Diluent Used:  0.25% bupivacaine  Total Volume of Diluent Used:  3 ml  NDC #: Botox 100u ([address]-55)      CONSENT  The risks, benefits, and treatment options were discussed with Westley Singh and he agreed to  proceed.    Written consent was obtained by [facility].     EQUIPMENT USED  Needle-30 gauge    SKIN PREPARATION  Skin preparation was performed using an alcohol wipe.    GUIDANCE DESCRIPTION  Guidance was not utilized for this procedure       AREA/MUSCLE INJECTED: 155 UNITS BOTOX = TOTAL DOSE, 2:1 DILUTION  1. NECK & SHOULDER GIRDLE MUSCLES: 35 UNITS BOTOX = TOTAL DOSE, 2:1 DILUTION  Right Upper Trapezius (upper, mid & low cervical) - 7.5 units of Botox at 3 site/s.   Left Upper Trapezius (upper, mid & low cervical) - 7.5 units of Botox at 3 site/s.     Right Splenius - 5 units of Botox at 1 site/s.   Left Splenius - 5 units of Botox at 1 site/s.     Right Levator Scapulae - 5 units of Botox at 1 site/s.   Left Levator Scapulae - 5 units of Botox at 1 site/s.    3. FACIAL & SCALP MUSCLES: 120 UNITS BOTOX = TOTAL DOSE, 2:1 DILUTION  Right Frontalis - 10 units of Botox at 2 site/s.  Left Frontalis - 10 units of Botox at 2 site/s.    Right Temporalis - 25 units of Botox at 5 site/s.  Left Temporalis - 25 units of Botox at 5 site/s.    Right Occipitalis - 20 units of Botox at 4 site/s.   Left Occipitalis - 20 units of Botox at 4 site/s.     Right  - 2.5 units of Botox at 1 site/s.   Left  - 2.5 units of Botox at 1 site/s.    Procerus - 5 units of Botox at 1 site/s.       RESPONSE TO PROCEDURE  Westley Singh tolerated the procedure well and there were no immediate complications. He was allowed to recover for an appropriate period of time and was discharged home in stable condition.    ASSESSMENT AND PLAN   Botulinum toxin injections: No changes made to Botox dose or distribution today. Patient will continue to monitor response to Botox and report at next appointment.    Medications: Refills for Nurtec sent to pharmacy to be used as a migraine rescue.   Referrals: None.   Follow up: Westley Singh was rescheduled for the next series of injections in 12 weeks, at which time we will evaluate response to today's  injections. he may call the clinic prior with any questions or concerns prior to the next appointment.       Again, thank you for allowing me to participate in the care of your patient.        Sincerely,        Kim Long MD

## 2024-06-26 RX ORDER — TOPIRAMATE 50 MG/1
50 TABLET, FILM COATED ORAL 2 TIMES DAILY
Qty: 60 TABLET | Refills: 0 | Status: SHIPPED | OUTPATIENT
Start: 2024-06-26 | End: 2024-07-26

## 2024-07-25 DIAGNOSIS — G43.719 CHRONIC MIGRAINE WITHOUT AURA, INTRACTABLE, WITHOUT STATUS MIGRAINOSUS: ICD-10-CM

## 2024-07-26 RX ORDER — TOPIRAMATE 50 MG/1
50 TABLET, FILM COATED ORAL 2 TIMES DAILY
Qty: 60 TABLET | Refills: 0 | Status: SHIPPED | OUTPATIENT
Start: 2024-07-26 | End: 2024-08-28

## 2024-07-26 NOTE — TELEPHONE ENCOUNTER
Refill request received from [facility]    Medication: topiramate (TOPAMAX) 50 MG tablet   Directions: TAKE ONE TABLET BY MOUTH TWICE DAILY     Last ordered: 6/26/24   Qty: #60  RF: 0  Last OV: 6/20/24  Next OV: 9/19/24    Routing to Dr. Long to review and sign if appropriate.    Violet Grimes, RN Care Coordinator  [facility]   [facility] main phone # [phone] fax # [phone]

## 2024-08-08 ENCOUNTER — OFFICE VISIT (OUTPATIENT)
Dept: PEDIATRICS | Facility: CLINIC | Age: 54
End: 2024-08-08
Payer: COMMERCIAL

## 2024-08-08 VITALS
WEIGHT: 226.5 LBS | DIASTOLIC BLOOD PRESSURE: 74 MMHG | HEART RATE: 78 BPM | HEIGHT: 72 IN | TEMPERATURE: 97.2 F | OXYGEN SATURATION: 99 % | RESPIRATION RATE: 18 BRPM | BODY MASS INDEX: 30.68 KG/M2 | SYSTOLIC BLOOD PRESSURE: 110 MMHG

## 2024-08-08 DIAGNOSIS — M25.552 PAIN OF BOTH HIP JOINTS: ICD-10-CM

## 2024-08-08 DIAGNOSIS — M25.551 PAIN OF BOTH HIP JOINTS: ICD-10-CM

## 2024-08-08 DIAGNOSIS — R53.83 FATIGUE, UNSPECIFIED TYPE: Primary | ICD-10-CM

## 2024-08-08 LAB
BASOPHILS # BLD AUTO: 0 10E3/UL (ref 0–0.2)
BASOPHILS NFR BLD AUTO: 0 %
CRP SERPL-MCNC: 5.76 MG/L
EOSINOPHIL # BLD AUTO: 0.2 10E3/UL (ref 0–0.7)
EOSINOPHIL NFR BLD AUTO: 3 %
ERYTHROCYTE [DISTWIDTH] IN BLOOD BY AUTOMATED COUNT: 12.5 % (ref 10–15)
ERYTHROCYTE [SEDIMENTATION RATE] IN BLOOD BY WESTERGREN METHOD: 10 MM/HR (ref 0–20)
HCT VFR BLD AUTO: 46.4 % (ref 40–53)
HGB BLD-MCNC: 15.1 G/DL (ref 13.3–17.7)
IMM GRANULOCYTES # BLD: 0 10E3/UL
IMM GRANULOCYTES NFR BLD: 0 %
LYMPHOCYTES # BLD AUTO: 1.7 10E3/UL (ref 0.8–5.3)
LYMPHOCYTES NFR BLD AUTO: 22 %
MCH RBC QN AUTO: 32.7 PG (ref 26.5–33)
MCHC RBC AUTO-ENTMCNC: 32.5 G/DL (ref 31.5–36.5)
MCV RBC AUTO: 100 FL (ref 78–100)
MONOCYTES # BLD AUTO: 0.7 10E3/UL (ref 0–1.3)
MONOCYTES NFR BLD AUTO: 9 %
NEUTROPHILS # BLD AUTO: 5.1 10E3/UL (ref 1.6–8.3)
NEUTROPHILS NFR BLD AUTO: 67 %
PLATELET # BLD AUTO: 200 10E3/UL (ref 150–450)
RBC # BLD AUTO: 4.62 10E6/UL (ref 4.4–5.9)
TSH SERPL DL<=0.005 MIU/L-ACNC: 1.72 UIU/ML (ref 0.3–4.2)
WBC # BLD AUTO: 7.7 10E3/UL (ref 4–11)

## 2024-08-08 PROCEDURE — 87468 ANAPLSMA PHGCYTOPHLM AMP PRB: CPT | Performed by: INTERNAL MEDICINE

## 2024-08-08 PROCEDURE — 36415 COLL VENOUS BLD VENIPUNCTURE: CPT | Performed by: INTERNAL MEDICINE

## 2024-08-08 PROCEDURE — 99214 OFFICE O/P EST MOD 30 MIN: CPT | Performed by: INTERNAL MEDICINE

## 2024-08-08 PROCEDURE — 86665 EPSTEIN-BARR CAPSID VCA: CPT | Performed by: INTERNAL MEDICINE

## 2024-08-08 PROCEDURE — 86618 LYME DISEASE ANTIBODY: CPT | Performed by: INTERNAL MEDICINE

## 2024-08-08 PROCEDURE — 99000 SPECIMEN HANDLING OFFICE-LAB: CPT | Performed by: INTERNAL MEDICINE

## 2024-08-08 PROCEDURE — 86140 C-REACTIVE PROTEIN: CPT | Performed by: INTERNAL MEDICINE

## 2024-08-08 PROCEDURE — 84443 ASSAY THYROID STIM HORMONE: CPT | Performed by: INTERNAL MEDICINE

## 2024-08-08 PROCEDURE — 86747 PARVOVIRUS ANTIBODY: CPT | Mod: 90 | Performed by: INTERNAL MEDICINE

## 2024-08-08 PROCEDURE — 85652 RBC SED RATE AUTOMATED: CPT | Performed by: INTERNAL MEDICINE

## 2024-08-08 PROCEDURE — 87798 DETECT AGENT NOS DNA AMP: CPT | Mod: 59 | Performed by: INTERNAL MEDICINE

## 2024-08-08 PROCEDURE — 86645 CMV ANTIBODY IGM: CPT | Performed by: INTERNAL MEDICINE

## 2024-08-08 PROCEDURE — 85025 COMPLETE CBC W/AUTO DIFF WBC: CPT | Performed by: INTERNAL MEDICINE

## 2024-08-08 ASSESSMENT — PATIENT HEALTH QUESTIONNAIRE - PHQ9
SUM OF ALL RESPONSES TO PHQ QUESTIONS 1-9: 7
10. IF YOU CHECKED OFF ANY PROBLEMS, HOW DIFFICULT HAVE THESE PROBLEMS MADE IT FOR YOU TO DO YOUR WORK, TAKE CARE OF THINGS AT HOME, OR GET ALONG WITH OTHER PEOPLE: NOT DIFFICULT AT ALL
SUM OF ALL RESPONSES TO PHQ QUESTIONS 1-9: 7

## 2024-08-08 ASSESSMENT — ENCOUNTER SYMPTOMS: FATIGUE: 1

## 2024-08-08 NOTE — PROGRESS NOTES
Assessment & Plan     (R53.83) Fatigue, unspecified type  (primary encounter diagnosis)  Comment: with scratchy throat and arthralgias (hip), but no cough/cold/congestion symptoms. Concern for viral illness including parvovirus, EBV, CMV or tick borne illnesses. If negative for these but inflammatory markers present, concern for rheumatologic process (although acute onset makes this less likely).  Plan: Parvovirus B19 antibodies IgG IgM, CRP,         inflammation, ESR: Erythrocyte sedimentation         rate, EBV Capsid Antibody IgM, LYME DISEASE         TOTAL ANTIBODIES WITH REFLEX TO CONFIRMATION,         Tick-Borne Disease Panel by PCR, CBC with         platelets and differential, TSH with free T4         reflex, CMV antibody IgM            (M25.551,  M25.552) Pain of both hip joints  Comment: as above  Plan: Parvovirus B19 antibodies IgG IgM, CRP,         inflammation, ESR: Erythrocyte sedimentation         rate, EBV Capsid Antibody IgM, LYME DISEASE         TOTAL ANTIBODIES WITH REFLEX TO CONFIRMATION,         Tick-Borne Disease Panel by PCR, CBC with         platelets and differential, TSH with free T4         reflex, CMV antibody IgM            The longitudinal plan of care for the diagnosis(es)/condition(s) as documented were addressed during this visit. Due to the added complexity in care, I will continue to support Westley in the subsequent management and with ongoing continuity of care.                 Subjective   Westley is a 54 year old, presenting for the following health issues:  Fatigue (Does not matter how much sleep I get I am still very tired ) and Arthritis (Left Knee and Bilateral hip pain)      8/8/2024    10:27 AM   Additional Questions   Roomed by KS   Accompanied by Self         8/8/2024    10:27 AM   Patient Reported Additional Medications   Patient reports taking the following new medications None     Fatigue  This is a new problem. The current episode started 1 to 4 weeks ago. Associated  "symptoms include fatigue. Nothing aggravates the symptoms. He has tried nothing for the symptoms. The treatment provided no relief.   Arthritis  This is a new problem. The current episode started 1 to 4 weeks ago. The problem occurs intermittently. The problem has been gradually worsening. Associated symptoms include fatigue. The symptoms are aggravated by bending, walking, twisting and exertion. He has tried nothing for the symptoms. The treatment provided no relief.   History of Present Illness       Headaches:   Since the patient's last clinic visit, headaches are: worsened  The patient is getting headaches:  Daily  He is not able to do normal daily activities when he has a migraine.  The patient is taking the following rescue/relief medications:  Tylenol, Maxalt and other   Patient states \"I get some relief\" from the rescue/relief medications.   The patient is taking the following medications to prevent migraines:  Topomax  In the past 4 weeks, the patient has gone to an Urgent Care or Emergency Room 0 times times due to headaches.    Reason for visit:  Extreme tiredness/exhaustion,  joint aches, flu-like symptoms  Symptom onset:  1-2 weeks ago  Symptoms include:  Headaches, joint aches (knees and hips), exhaustion, body aches  Symptom intensity:  Moderate  Symptom progression:  Staying the same  Had these symptoms before:  No  What makes it worse:  No  What makes it better:  Acetaminophen will help headache    He eats 4 or more servings of fruits and vegetables daily.He consumes 0 sweetened beverage(s) daily.He exercises with enough effort to increase his heart rate 30 to 60 minutes per day.  He exercises with enough effort to increase his heart rate 4 days per week.   He is taking medications regularly.     Westley describes that he has been very tired, with hoarse/raspy voice.  Present for the past two weeks. Some joint pain (left knee and both hips), thinks left knee might have some degenerative arthritis, but " "pain is worse than normal especially when going down stairs. Both hips sore on the inside, starting 3 days ago. Sitting provides relief. Lying down is also ok for hip pain.     Also has generalized body aches, \"flu-like feeling\" for the past week or so. Started slowly, now it's been really steady for the past week.     The exhaustion/sleepiness have been for 2 weeks.     Has had increased frequency of migraine.       No known sick contacts.     [place]     He notes that about a month ago he was mowing the lawn and he was stung 3-4 times by something                    Objective    /74 (BP Location: Right arm, Patient Position: Sitting, Cuff Size: Adult Large)   Pulse 78   Temp 97.2  F (36.2  C) (Temporal)   Resp 18   Ht 1.82 m (5' 11.65\")   Wt 102.7 kg (226 lb 8 oz)   SpO2 99%   BMI 31.02 kg/m    Body mass index is 31.02 kg/m .  Physical Exam   GENERAL: alert and no distress  EYES: Eyes grossly normal to inspection, PERRL and conjunctivae and sclerae normal  HENT: ear canals and TM's normal, nose and mouth without ulcers or lesions  NECK: no adenopathy, no asymmetry, masses, or scars  RESP: lungs clear to auscultation - no rales, rhonchi or wheezes  CV: regular rate and rhythm, normal S1 S2, no S3 or S4, no murmur, click or rub, no peripheral edema  ABDOMEN: soft, nontender, no hepatosplenomegaly, no masses and bowel sounds normal  MS: no gross musculoskeletal defects noted, no edema  SKIN: no suspicious lesions or rashes  NEURO: Normal strength and tone, mentation intact and speech normal  PSYCH: mentation appears normal, affect normal/bright            Signed Electronically by: Catalina Michel MD    "

## 2024-08-09 ENCOUNTER — MYC MEDICAL ADVICE (OUTPATIENT)
Dept: PEDIATRICS | Facility: CLINIC | Age: 54
End: 2024-08-09
Payer: COMMERCIAL

## 2024-08-09 LAB
A PHAGOCYTOPH DNA BLD QL NAA+PROBE: NOT DETECTED
B BURGDOR IGG+IGM SER QL: 0.18
BABESIA DNA BLD QL NAA+PROBE: NOT DETECTED
CMV IGM SERPL IA-ACNC: 11.2 AU/ML
CMV IGM SERPL IA-ACNC: NEGATIVE
EBV VCA IGM SER IA-ACNC: 10.2 U/ML
EBV VCA IGM SER IA-ACNC: NORMAL
EHRLICHIA DNA SPEC QL NAA+PROBE: NOT DETECTED

## 2024-08-11 LAB
B19V IGG SER IA-ACNC: 0.53 IV
B19V IGM SER IA-ACNC: 0.2 IV

## 2024-08-12 NOTE — TELEPHONE ENCOUNTER
Results have not been interpreted at this time, 8/12. Per chart review no further provider instruction for patient symptoms. Patient instructed to call [phone] is symptoms worsen.     Ariane Carter RN on 8/12/2024 at 9:26 AM

## 2024-08-19 ENCOUNTER — ALLIED HEALTH/NURSE VISIT (OUTPATIENT)
Dept: FAMILY MEDICINE | Facility: CLINIC | Age: 54
End: 2024-08-19
Payer: COMMERCIAL

## 2024-08-19 DIAGNOSIS — Z23 ENCOUNTER FOR IMMUNIZATION: Primary | ICD-10-CM

## 2024-08-19 PROCEDURE — 90746 HEPB VACCINE 3 DOSE ADULT IM: CPT

## 2024-08-19 PROCEDURE — 90472 IMMUNIZATION ADMIN EACH ADD: CPT

## 2024-08-19 PROCEDURE — 90750 HZV VACC RECOMBINANT IM: CPT

## 2024-08-19 PROCEDURE — 90471 IMMUNIZATION ADMIN: CPT

## 2024-08-19 NOTE — PROGRESS NOTES
Prior to immunization administration, verified patients identity using patient s name and date of birth. Please see Immunization Activity for additional information.     Is the patient's temperature normal (100.5 or less)? Yes     Patient MEETS CRITERIA. PROCEED with vaccine administration.                          Patient MEETS CRITERIA. PROCEED with vaccine administration.          8/19/2024   Zoster   Have you had a serious reaction to the shingles vaccine or something in the shingles vaccine? No   Do you have shingles now? No   Are you getting treatment for cancer, organ transplant, or bone marrow transplant? No   Do you have an autoimmune or inflammatory condition? No   Is the patient immunocompromised and wanting to complete the Shingles vaccine series in less than 2 months? No   Have you had Guillain-Winchester syndrome within 6 weeks of getting a vaccine? No            Patient MEETS CRITERIA. PROCEED with vaccine administration.      Patient instructed to remain in clinic for 15 minutes afterwards, and to report any adverse reactions.      Link to Ancillary Visit Immunization Standing Orders SmartSet     Screening performed by Carmen Roman LPN on 8/19/2024 at 1:34 PM.

## 2024-08-25 DIAGNOSIS — G43.719 CHRONIC MIGRAINE WITHOUT AURA, INTRACTABLE, WITHOUT STATUS MIGRAINOSUS: ICD-10-CM

## 2024-08-27 NOTE — TELEPHONE ENCOUNTER
Refill request received from [facility]    Medication: topiramate (TOPAMAX) 50 MG tablet   Directions: TAKE ONE TABLET BY MOUTH TWICE DAILY      Last ordered: 7/26/24   Qty: #60  RF: 0  Last OV: 6/20/24  Next OV: 9/19/24    Routing to Dr. Long to review and sign if appropriate.    Violet Grimes, RN Care Coordinator  [facility]   [facility] main phone # [phone] fax # [phone]

## 2024-08-28 RX ORDER — TOPIRAMATE 50 MG/1
50 TABLET, FILM COATED ORAL 2 TIMES DAILY
Qty: 60 TABLET | Refills: 0 | Status: SHIPPED | OUTPATIENT
Start: 2024-08-28 | End: 2024-10-01

## 2024-09-09 DIAGNOSIS — G47.00 INSOMNIA, UNSPECIFIED TYPE: ICD-10-CM

## 2024-09-09 DIAGNOSIS — F07.81 POST CONCUSSION SYNDROME: ICD-10-CM

## 2024-09-16 DIAGNOSIS — G43.719 CHRONIC MIGRAINE WITHOUT AURA, INTRACTABLE, WITHOUT STATUS MIGRAINOSUS: ICD-10-CM

## 2024-09-16 DIAGNOSIS — J45.30 MILD PERSISTENT ASTHMA WITHOUT COMPLICATION: ICD-10-CM

## 2024-09-16 RX ORDER — FLUTICASONE PROPIONATE AND SALMETEROL XINAFOATE 115; 21 UG/1; UG/1
2 AEROSOL, METERED RESPIRATORY (INHALATION) 2 TIMES DAILY
Qty: 12 G | Refills: 0 | Status: SHIPPED | OUTPATIENT
Start: 2024-09-16

## 2024-09-16 RX ORDER — AMITRIPTYLINE HYDROCHLORIDE 50 MG/1
50-100 TABLET ORAL AT BEDTIME
Qty: 60 TABLET | Refills: 3 | Status: SHIPPED | OUTPATIENT
Start: 2024-09-16

## 2024-09-16 RX ORDER — RIZATRIPTAN BENZOATE 10 MG/1
TABLET ORAL
Qty: 10 TABLET | Refills: 3 | Status: SHIPPED | OUTPATIENT
Start: 2024-09-16

## 2024-09-16 NOTE — TELEPHONE ENCOUNTER
Refill request for the following medication (s) listed below.    Pending Prescriptions:                       Disp   Refills    amitriptyline (ELAVIL) 50 MG tablet [Phar*60 tab*3            Sig: take 1 to 2 tablets by mouth at bedtime      Last office visit provider:  06/20/24  Next appointment scheduled: 09/19/24      Medication T'd for review and signature    Stefani Dominguez MA on 9/16/2024 at 2:42 PM

## 2024-09-16 NOTE — TELEPHONE ENCOUNTER
Refill request for the following medication (s) listed below.    Pending Prescriptions:                       Disp   Refills    rizatriptan (MAXALT) 10 MG tablet [Pharma*10 tab*3            Sig: take 0.5 tablet by mouth at onset of headache for           migraine. may repeat in 2 hours. max of 3 tablets           by mouth in 24 hours.      Last office visit provider:  06/20/24  Next appointment scheduled: 09/19/24      Medication T'd for review and signature  Stefani Dominguez MA on 9/16/2024 at 2:44 PM

## 2024-09-16 NOTE — TELEPHONE ENCOUNTER
A user error has taken place: encounter opened in error, closed for administrative reasons.    
Him/He

## 2024-09-19 ENCOUNTER — OFFICE VISIT (OUTPATIENT)
Dept: PHYSICAL MEDICINE AND REHAB | Facility: CLINIC | Age: 54
End: 2024-09-19
Payer: COMMERCIAL

## 2024-09-19 VITALS — HEART RATE: 80 BPM | DIASTOLIC BLOOD PRESSURE: 83 MMHG | SYSTOLIC BLOOD PRESSURE: 121 MMHG

## 2024-09-19 DIAGNOSIS — G43.719 CHRONIC MIGRAINE WITHOUT AURA, INTRACTABLE, WITHOUT STATUS MIGRAINOSUS: Primary | ICD-10-CM

## 2024-09-19 PROCEDURE — 64615 CHEMODENERV MUSC MIGRAINE: CPT | Performed by: PHYSICAL MEDICINE & REHABILITATION

## 2024-09-19 NOTE — NURSING NOTE
Chief Complaint   Patient presents with    Procedure     BOTOX     Stefani Dominguez MA on 9/19/2024 at 12:46 PM

## 2024-09-19 NOTE — PROGRESS NOTES
[facility]    [facility] CL[facility] NOTE  BOTULINUM TOXIN PROCEDURE      HPI  Chief Complaint   Patient presents with    Procedure     BOTOX     Westley Singh is a 54 year old male with a history of post concussion syndrome and intractable migraine headaches who presents to clinic for botulinum toxin injections for management of chronic migraine headaches.     SINCE LAST VISIT  Westley Singh was last seen here in clinic on 6/20/2024, at which time he received 155 units of Botox.     Patient denies new medical diagnoses, illnesses, hospitalizations, emergency room visits, and injuries since the previous injection with botulinum neurotoxin.       RESPONSE TO PREVIOUS TREATMENT    Side effects: No problems reported.  Post-procedural headache: Yes. He did get a headache that afternoon, which lasted into the next day.     1.  Headache frequency during this injection cycle: This past round of Botox was amazing, and he only had a few migraines this entire injection cycle, which he attributed to allergies. This is compared to his baseline headache frequency of 30 headache days per month.     2.  Headache duration during this injection cycle:  Headache duration ranged from 2 hours to several days. Patient reports one episodes of multiple day headaches during this injection cycle.     3.  Headache intensity during this injection cycle:    A.  3/10  =  Typical pain level.  B.  7/10  =  Worst pain level.  C.  0/10  =  Lowest pain level.    4.  Change in headache medication usage during this injection cycle:  (For Example:  Able to decrease use of oral pain medications.) He has been using Nurtec as needed for migraine rescue, and this works exceptionally well. He uses Tylenol for the more mild headaches.     5.  ER Visits During This Injection Cycle:  None.     6.  Functional Performance:  Change in ADL's, social interaction, days lost from work, etc. Patient reports being able to more fully participate in social  and family activities and responsibilities as headache symptoms have improved      PHYSICAL EXAM  VS: /83 (BP Location: Right arm, Patient Position: Sitting)   Pulse 80    GEN: Pleasant and cooperative, in no acute distress  HEENT: No facial asymmetry    ALLERGIES  Allergies   Allergen Reactions    Aspirin     Cephalosporins     Penicillins        CURRENT MEDICATIONS    Current Outpatient Medications:     ADVAIR -21 MCG/ACT inhaler, INHALE 2 PUFFS INTO THE LUNGS 2 TIMES DAILY., Disp: 12 g, Rfl: 0    albuterol (PROAIR HFA/PROVENTIL HFA/VENTOLIN HFA) 108 (90 Base) MCG/ACT inhaler, INHALE TWO PUFFS BY MOUT EVERY SIX HOURS AS NEEDED FOR SHORNTESS OF BREATH OF DYPSNEA, Disp: 8.5 g, Rfl: 0    amitriptyline (ELAVIL) 50 MG tablet, take 1 to 2 tablets by mouth at bedtime, Disp: 60 tablet, Rfl: 3    amLODIPine (NORVASC) 5 MG tablet, Take 1 tablet (5 mg) by mouth daily, Disp: 90 tablet, Rfl: 3    mometasone (NASONEX) 50 MCG/ACT nasal spray, , Disp: , Rfl:     rimegepant (NURTEC) 75 MG ODT tablet, Place 1 tablet (75 mg) under the tongue daily as needed for migraine Maximum of 1 tablet every 24 hours., Disp: 8 tablet, Rfl: 3    rizatriptan (MAXALT) 10 MG tablet, take 0.5 tablet by mouth at onset of headache for migraine. may repeat in 2 hours. max of 3 tablets by mouth in 24 hours., Disp: 10 tablet, Rfl: 3    tadalafil (CIALIS) 10 MG tablet, Take 1 tablet (10 mg) by mouth daily as needed Take 30 minutes prior to sexual activity, no more than once daily., Disp: 12 tablet, Rfl: 11    topiramate (TOPAMAX) 50 MG tablet, TAKE ONE TABLET BY MOUTH TWICE DAILY, Disp: 60 tablet, Rfl: 0    Current Facility-Administered Medications:     botulinum toxin type A (BOTOX) 100 units injection 155 Units, 155 Units, Intramuscular, Q90 Days, Standal, Kim Johnson MD, 155 Units at 06/20/24 0957       BOTULINUM NEUROTOXIN INJECTION PROCEDURES    VERIFICATION OF PATIENT IDENTIFICATION AND PROCEDURE     Initials   Patient Name SES    Patient  SES   Procedure Verified by: SES     Prior to the start of the procedure and with procedural staff participation, I verbally confirmed the patient s identity using two indicators, relevant allergies, that the procedure was appropriate and matched the consent or emergent situation, and that the correct equipment/implants were available. Immediately prior to starting the procedure I conducted the Time Out with the procedural staff and re-confirmed the patient s name, procedure, and site/side. (The Joint Commission universal protocol was followed.)  Yes    Sedation (Moderate or Deep): None    ABOVE ASSESSMENTS PERFORMED BY    Kim Long MD      INDICATIONS FOR PROCEDURES  Westley Singh is a 54 year old patient with chronic migraine headaches which have been recalcitrant to oral medications and conservative therapy.  He is here today for injection with Botox.    GOAL OF PROCEDURE  The goal of this procedure is to increase active range of motion, improve volitional motor control and decrease pain .      TOTAL DOSE ADMINISTERED  Dose Administered:  155 units  Botox (Botulinum Toxin Type A)       2:1 Dilution   Unavoidable Drug Waste: Yes  Amount of drug waste (mL): 45 units Botox.  Reason for waste:  Single use vial  Diluent Used:  0.25% bupivacaine  Total Volume of Diluent Used:  3 ml  NDC #: Botox 100u ([address]-42)      CONSENT  The risks, benefits, and treatment options were discussed with Westley Singh and he agreed to proceed.    Written consent was obtained by [facility].     EQUIPMENT USED  Needle-30 gauge    SKIN PREPARATION  Skin preparation was performed using an alcohol wipe.    GUIDANCE DESCRIPTION  Guidance was not utilized for this procedure       AREA/MUSCLE INJECTED: 155 UNITS BOTOX = TOTAL DOSE, 2:1 DILUTION  1. NECK & SHOULDER GIRDLE MUSCLES: 35 UNITS BOTOX = TOTAL DOSE, 2:1 DILUTION  Right Upper Trapezius (upper, mid & low cervical) - 7.5 units of Botox at 3 site/s.   Left Upper  Trapezius (upper, mid & low cervical) - 7.5 units of Botox at 3 site/s.     Right Splenius - 5 units of Botox at 1 site/s.   Left Splenius - 5 units of Botox at 1 site/s.     Right Levator Scapulae - 5 units of Botox at 1 site/s.   Left Levator Scapulae - 5 units of Botox at 1 site/s.    3. FACIAL & SCALP MUSCLES: 120 UNITS BOTOX = TOTAL DOSE, 2:1 DILUTION  Right Frontalis - 10 units of Botox at 2 site/s.  Left Frontalis - 10 units of Botox at 2 site/s.    Right Temporalis - 25 units of Botox at 5 site/s.  Left Temporalis - 25 units of Botox at 5 site/s.    Right Occipitalis - 20 units of Botox at 4 site/s.   Left Occipitalis - 20 units of Botox at 4 site/s.     Right  - 2.5 units of Botox at 1 site/s.   Left  - 2.5 units of Botox at 1 site/s.    Procerus - 5 units of Botox at 1 site/s.       RESPONSE TO PROCEDURE  Westley Singh tolerated the procedure well and there were no immediate complications. He was allowed to recover for an appropriate period of time and was discharged home in stable condition.    ASSESSMENT AND PLAN   Botulinum toxin injections: No changes made to Botox dose or distribution today. Patient will continue to monitor response to Botox and report at next appointment.    Medications: Refills for Nurtec sent to pharmacy to be used as a migraine rescue.   Referrals: None.   Follow up: Westley Singh was rescheduled for the next series of injections in 12 weeks, at which time we will evaluate response to today's injections. he may call the clinic prior with any questions or concerns prior to the next appointment.

## 2024-09-19 NOTE — LETTER
9/19/2024      Westley Singh  [address] [facility] [address]      Dear Colleague,    Thank you for referring your patient, Westley Singh, to the [facility]. Please see a copy of my visit note below.      [facility]    PM&R CLINIC NOTE  BOTULINUM TOXIN PROCEDURE      HPI  Chief Complaint   Patient presents with     Procedure     BOTOX     Westley Singh is a 54 year old male with a history of post concussion syndrome and intractable migraine headaches who presents to clinic for botulinum toxin injections for management of chronic migraine headaches.     SINCE LAST VISIT  Westley Singh was last seen here in clinic on 6/20/2024, at which time he received 155 units of Botox.     Patient denies new medical diagnoses, illnesses, hospitalizations, emergency room visits, and injuries since the previous injection with botulinum neurotoxin.       RESPONSE TO PREVIOUS TREATMENT    Side effects: No problems reported.  Post-procedural headache: Yes. He did get a headache that afternoon, which lasted into the next day.     1.  Headache frequency during this injection cycle: This past round of Botox was amazing, and he only had a few migraines this entire injection cycle, which he attributed to allergies. This is compared to his baseline headache frequency of 30 headache days per month.     2.  Headache duration during this injection cycle:  Headache duration ranged from 2 hours to several days. Patient reports one episodes of multiple day headaches during this injection cycle.     3.  Headache intensity during this injection cycle:    A.  3/10  =  Typical pain level.  B.  7/10  =  Worst pain level.  C.  0/10  =  Lowest pain level.    4.  Change in headache medication usage during this injection cycle:  (For Example:  Able to decrease use of oral pain medications.) He has been using Nurtec as needed for migraine rescue, and this works exceptionally well. He uses Tylenol  for the more mild headaches.     5.  ER Visits During This Injection Cycle:  None.     6.  Functional Performance:  Change in ADL's, social interaction, days lost from work, etc. Patient reports being able to more fully participate in social and family activities and responsibilities as headache symptoms have improved      PHYSICAL EXAM  VS: /83 (BP Location: Right arm, Patient Position: Sitting)   Pulse 80    GEN: Pleasant and cooperative, in no acute distress  HEENT: No facial asymmetry    ALLERGIES  Allergies   Allergen Reactions     Aspirin      Cephalosporins      Penicillins        CURRENT MEDICATIONS    Current Outpatient Medications:      ADVAIR -21 MCG/ACT inhaler, INHALE 2 PUFFS INTO THE LUNGS 2 TIMES DAILY., Disp: 12 g, Rfl: 0     albuterol (PROAIR HFA/PROVENTIL HFA/VENTOLIN HFA) 108 (90 Base) MCG/ACT inhaler, INHALE TWO PUFFS BY MOUT EVERY SIX HOURS AS NEEDED FOR SHORNTESS OF BREATH OF DYPSNEA, Disp: 8.5 g, Rfl: 0     amitriptyline (ELAVIL) 50 MG tablet, take 1 to 2 tablets by mouth at bedtime, Disp: 60 tablet, Rfl: 3     amLODIPine (NORVASC) 5 MG tablet, Take 1 tablet (5 mg) by mouth daily, Disp: 90 tablet, Rfl: 3     mometasone (NASONEX) 50 MCG/ACT nasal spray, , Disp: , Rfl:      rimegepant (NURTEC) 75 MG ODT tablet, Place 1 tablet (75 mg) under the tongue daily as needed for migraine Maximum of 1 tablet every 24 hours., Disp: 8 tablet, Rfl: 3     rizatriptan (MAXALT) 10 MG tablet, take 0.5 tablet by mouth at onset of headache for migraine. may repeat in 2 hours. max of 3 tablets by mouth in 24 hours., Disp: 10 tablet, Rfl: 3     tadalafil (CIALIS) 10 MG tablet, Take 1 tablet (10 mg) by mouth daily as needed Take 30 minutes prior to sexual activity, no more than once daily., Disp: 12 tablet, Rfl: 11     topiramate (TOPAMAX) 50 MG tablet, TAKE ONE TABLET BY MOUTH TWICE DAILY, Disp: 60 tablet, Rfl: 0    Current Facility-Administered Medications:      botulinum toxin type A (BOTOX) 100  units injection 155 Units, 155 Units, Intramuscular, Q90 Days, Kim Long MD, 155 Units at 24 0957       BOTULINUM NEUROTOXIN INJECTION PROCEDURES    VERIFICATION OF PATIENT IDENTIFICATION AND PROCEDURE     Initials   Patient Name SES   Patient  SES   Procedure Verified by: SES     Prior to the start of the procedure and with procedural staff participation, I verbally confirmed the patient s identity using two indicators, relevant allergies, that the procedure was appropriate and matched the consent or emergent situation, and that the correct equipment/implants were available. Immediately prior to starting the procedure I conducted the Time Out with the procedural staff and re-confirmed the patient s name, procedure, and site/side. (The Joint Commission universal protocol was followed.)  Yes    Sedation (Moderate or Deep): None    ABOVE ASSESSMENTS PERFORMED BY    Kim Long MD      INDICATIONS FOR PROCEDURES  Westley Singh is a 54 year old patient with chronic migraine headaches which have been recalcitrant to oral medications and conservative therapy.  He is here today for injection with Botox.    GOAL OF PROCEDURE  The goal of this procedure is to increase active range of motion, improve volitional motor control and decrease pain .      TOTAL DOSE ADMINISTERED  Dose Administered:  155 units  Botox (Botulinum Toxin Type A)       2:1 Dilution   Unavoidable Drug Waste: Yes  Amount of drug waste (mL): 45 units Botox.  Reason for waste:  Single use vial  Diluent Used:  0.25% bupivacaine  Total Volume of Diluent Used:  3 ml  NDC #: Botox 100u ([address]-61)      CONSENT  The risks, benefits, and treatment options were discussed with Westley Singh and he agreed to proceed.    Written consent was obtained by [facility].     EQUIPMENT USED  Needle-30 gauge    SKIN PREPARATION  Skin preparation was performed using an alcohol wipe.    GUIDANCE DESCRIPTION  Guidance was not utilized for this procedure        AREA/MUSCLE INJECTED: 155 UNITS BOTOX = TOTAL DOSE, 2:1 DILUTION  1. NECK & SHOULDER GIRDLE MUSCLES: 35 UNITS BOTOX = TOTAL DOSE, 2:1 DILUTION  Right Upper Trapezius (upper, mid & low cervical) - 7.5 units of Botox at 3 site/s.   Left Upper Trapezius (upper, mid & low cervical) - 7.5 units of Botox at 3 site/s.     Right Splenius - 5 units of Botox at 1 site/s.   Left Splenius - 5 units of Botox at 1 site/s.     Right Levator Scapulae - 5 units of Botox at 1 site/s.   Left Levator Scapulae - 5 units of Botox at 1 site/s.    3. FACIAL & SCALP MUSCLES: 120 UNITS BOTOX = TOTAL DOSE, 2:1 DILUTION  Right Frontalis - 10 units of Botox at 2 site/s.  Left Frontalis - 10 units of Botox at 2 site/s.    Right Temporalis - 25 units of Botox at 5 site/s.  Left Temporalis - 25 units of Botox at 5 site/s.    Right Occipitalis - 20 units of Botox at 4 site/s.   Left Occipitalis - 20 units of Botox at 4 site/s.     Right  - 2.5 units of Botox at 1 site/s.   Left  - 2.5 units of Botox at 1 site/s.    Procerus - 5 units of Botox at 1 site/s.       RESPONSE TO PROCEDURE  Westley Singh tolerated the procedure well and there were no immediate complications. He was allowed to recover for an appropriate period of time and was discharged home in stable condition.    ASSESSMENT AND PLAN   Botulinum toxin injections: No changes made to Botox dose or distribution today. Patient will continue to monitor response to Botox and report at next appointment.    Medications: Refills for Nurtec sent to pharmacy to be used as a migraine rescue.   Referrals: None.   Follow up: Westley Singh was rescheduled for the next series of injections in 12 weeks, at which time we will evaluate response to today's injections. he may call the clinic prior with any questions or concerns prior to the next appointment.         Again, thank you for allowing me to participate in the care of your patient.        Sincerely,        Kim URIBE  MD Mercedes

## 2024-09-30 DIAGNOSIS — G43.719 CHRONIC MIGRAINE WITHOUT AURA, INTRACTABLE, WITHOUT STATUS MIGRAINOSUS: ICD-10-CM

## 2024-10-01 RX ORDER — TOPIRAMATE 50 MG/1
50 TABLET, FILM COATED ORAL 2 TIMES DAILY
Qty: 60 TABLET | Refills: 3 | Status: SHIPPED | OUTPATIENT
Start: 2024-10-01

## 2024-10-01 NOTE — TELEPHONE ENCOUNTER
Refill request received from [facility]    Medication: topiramate (TOPAMAX) 50 MG tablet   Directions: TAKE ONE TABLET BY MOUTH TWICE DAILY      Last ordered: 8/28/24   Qty: #60  RF: 0  Last OV: 9/19/24  Next OV: 12/19/24    Routing to Dr. Long to review and sign if appropriate.    Violet Grimes, RN Care Coordinator  [facility]   [facility] R Clinic main phone # [phone] fax # [phone]

## 2024-10-07 RX ORDER — BUPIVACAINE HYDROCHLORIDE 2.5 MG/ML
3 INJECTION, SOLUTION EPIDURAL; INFILTRATION; INTRACAUDAL ONCE
Status: COMPLETED | OUTPATIENT
Start: 2024-10-07 | End: 2024-10-07

## 2024-10-07 RX ADMIN — BUPIVACAINE HYDROCHLORIDE 7.5 MG: 2.5 INJECTION, SOLUTION EPIDURAL; INFILTRATION; INTRACAUDAL at 14:09

## 2024-11-10 DIAGNOSIS — J45.30 MILD PERSISTENT ASTHMA WITHOUT COMPLICATION: ICD-10-CM

## 2024-11-11 RX ORDER — FLUTICASONE PROPIONATE AND SALMETEROL XINAFOATE 115; 21 UG/1; UG/1
2 AEROSOL, METERED RESPIRATORY (INHALATION) 2 TIMES DAILY
Qty: 12 G | Refills: 0 | Status: SHIPPED | OUTPATIENT
Start: 2024-11-11

## 2024-12-04 ENCOUNTER — MYC MEDICAL ADVICE (OUTPATIENT)
Dept: FAMILY MEDICINE | Facility: CLINIC | Age: 54
End: 2024-12-04
Payer: COMMERCIAL

## 2024-12-04 NOTE — TELEPHONE ENCOUNTER
Can try tylenol for pain as needed and ambulated as much as you can.  Should follow-up once you are back in [place].  Should be seen if having fever,severe pain, lack of range of motion.

## 2024-12-04 NOTE — TELEPHONE ENCOUNTER
Patient returned call. RN relayed provider response below. Patient stated he verbalized understanding and agreement in plan. Transferred to TC line to get appointment scheduled for when he returns.  Abby Mendoza RN

## 2024-12-04 NOTE — TELEPHONE ENCOUNTER
LMTCB or to respond to MCM.       Routing to MARGARETH and Abby as Abby DOE this morning. Please advise on recommendations for managing knee pain on flight home from [place].       TEJINDER DudleyN, RN     [facility]    12/04/2024 at 10:15 AM

## 2024-12-04 NOTE — TELEPHONE ENCOUNTER
Called patient, left voicemail for call back to any triage nurse.  Pt has been advised of Dr Indira Cisneros's recommendation via .    Marlena Diehl RN on 12/4/2024 at 1:14 PM

## 2024-12-11 ENCOUNTER — OFFICE VISIT (OUTPATIENT)
Dept: FAMILY MEDICINE | Facility: CLINIC | Age: 54
End: 2024-12-11
Payer: COMMERCIAL

## 2024-12-11 ENCOUNTER — ANCILLARY PROCEDURE (OUTPATIENT)
Dept: GENERAL RADIOLOGY | Facility: CLINIC | Age: 54
End: 2024-12-11
Attending: PHYSICIAN ASSISTANT
Payer: COMMERCIAL

## 2024-12-11 VITALS
TEMPERATURE: 98.1 F | OXYGEN SATURATION: 96 % | SYSTOLIC BLOOD PRESSURE: 135 MMHG | RESPIRATION RATE: 18 BRPM | HEART RATE: 103 BPM | BODY MASS INDEX: 31.42 KG/M2 | DIASTOLIC BLOOD PRESSURE: 88 MMHG | HEIGHT: 72 IN | WEIGHT: 232 LBS

## 2024-12-11 DIAGNOSIS — M25.562 ACUTE PAIN OF LEFT KNEE: Primary | ICD-10-CM

## 2024-12-11 DIAGNOSIS — M25.562 ACUTE PAIN OF LEFT KNEE: ICD-10-CM

## 2024-12-11 PROCEDURE — 73562 X-RAY EXAM OF KNEE 3: CPT | Mod: TC | Performed by: RADIOLOGY

## 2024-12-11 PROCEDURE — G2211 COMPLEX E/M VISIT ADD ON: HCPCS | Performed by: PHYSICIAN ASSISTANT

## 2024-12-11 PROCEDURE — 99214 OFFICE O/P EST MOD 30 MIN: CPT | Performed by: PHYSICIAN ASSISTANT

## 2024-12-11 ASSESSMENT — ANXIETY QUESTIONNAIRES
3. WORRYING TOO MUCH ABOUT DIFFERENT THINGS: NOT AT ALL
7. FEELING AFRAID AS IF SOMETHING AWFUL MIGHT HAPPEN: NOT AT ALL
GAD7 TOTAL SCORE: 0
5. BEING SO RESTLESS THAT IT IS HARD TO SIT STILL: NOT AT ALL
2. NOT BEING ABLE TO STOP OR CONTROL WORRYING: NOT AT ALL
6. BECOMING EASILY ANNOYED OR IRRITABLE: NOT AT ALL
8. IF YOU CHECKED OFF ANY PROBLEMS, HOW DIFFICULT HAVE THESE MADE IT FOR YOU TO DO YOUR WORK, TAKE CARE OF THINGS AT HOME, OR GET ALONG WITH OTHER PEOPLE?: NOT DIFFICULT AT ALL
7. FEELING AFRAID AS IF SOMETHING AWFUL MIGHT HAPPEN: NOT AT ALL
4. TROUBLE RELAXING: NOT AT ALL
1. FEELING NERVOUS, ANXIOUS, OR ON EDGE: NOT AT ALL
GAD7 TOTAL SCORE: 0
GAD7 TOTAL SCORE: 0
IF YOU CHECKED OFF ANY PROBLEMS ON THIS QUESTIONNAIRE, HOW DIFFICULT HAVE THESE PROBLEMS MADE IT FOR YOU TO DO YOUR WORK, TAKE CARE OF THINGS AT HOME, OR GET ALONG WITH OTHER PEOPLE: NOT DIFFICULT AT ALL

## 2024-12-11 ASSESSMENT — ASTHMA QUESTIONNAIRES
ACT_TOTALSCORE: 24
QUESTION_5 LAST FOUR WEEKS HOW WOULD YOU RATE YOUR ASTHMA CONTROL: COMPLETELY CONTROLLED
QUESTION_1 LAST FOUR WEEKS HOW MUCH OF THE TIME DID YOUR ASTHMA KEEP YOU FROM GETTING AS MUCH DONE AT WORK, SCHOOL OR AT HOME: NONE OF THE TIME
ACT_TOTALSCORE: 24
QUESTION_3 LAST FOUR WEEKS HOW OFTEN DID YOUR ASTHMA SYMPTOMS (WHEEZING, COUGHING, SHORTNESS OF BREATH, CHEST TIGHTNESS OR PAIN) WAKE YOU UP AT NIGHT OR EARLIER THAN USUAL IN THE MORNING: NOT AT ALL
QUESTION_4 LAST FOUR WEEKS HOW OFTEN HAVE YOU USED YOUR RESCUE INHALER OR NEBULIZER MEDICATION (SUCH AS ALBUTEROL): ONCE A WEEK OR LESS
QUESTION_2 LAST FOUR WEEKS HOW OFTEN HAVE YOU HAD SHORTNESS OF BREATH: NOT AT ALL

## 2024-12-11 ASSESSMENT — PATIENT HEALTH QUESTIONNAIRE - PHQ9
10. IF YOU CHECKED OFF ANY PROBLEMS, HOW DIFFICULT HAVE THESE PROBLEMS MADE IT FOR YOU TO DO YOUR WORK, TAKE CARE OF THINGS AT HOME, OR GET ALONG WITH OTHER PEOPLE: NOT DIFFICULT AT ALL
SUM OF ALL RESPONSES TO PHQ QUESTIONS 1-9: 2
SUM OF ALL RESPONSES TO PHQ QUESTIONS 1-9: 2

## 2024-12-11 NOTE — PROGRESS NOTES
Assessment & Plan     Acute pain of left knee    Uncertain cause. Could be mild arthritis with normal xray. Recommend continuing and adding some conservative measures as below. Follow-up with orthopedics to determine if additional testing or treatment is needed. Did discuss PT but will hold off until ortho visit.    - XR Knee Left 3 Views; Future  - Orthopedic  Referral; Future      The longitudinal plan of care for the diagnosis(es)/condition(s) as documented were addressed during this visit. Due to the added complexity in care, I will continue to support Westley in the subsequent management and with ongoing continuity of care.      Patient Instructions   I recommend trying Voltaren (diclofenac) gel up to 4 times a day.    You can try applying ice 2-3 times a day for 15-20 minutes at a time.     You can also try 600 mg of ibuprofen 2-3 times a day for up to a month.       Subjective   Westley is a 54 year old, presenting for the following health issues:  Musculoskeletal Problem        12/11/2024     9:22 AM   Additional Questions   Roomed by Susy SHETTY     Musculoskeletal Problem    History of Present Illness       Reason for visit:  Left knee pain  Symptom onset:  More than a month  Symptoms include:  Sharp pain on side / just below left knee. No noticeable swelling. Aggravated going on stairs, manageable using compression brace.  Symptom intensity:  Moderate  Symptom progression:  Improving  Had these symptoms before:  Yes  Has tried/received treatment for these symptoms:  No  What makes it worse:  Stairs, lots of walking, running  What makes it better:  Using the brace   He is taking medications regularly.       Pain History:  When did you first notice your pain? 10 days ago    Have you seen anyone else for your pain? No  How has your pain affected your ability to work? 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 Musculoskeletal  problem/pain  Onset/Duration: 10 days ago   Description  Location: leg - left, left knee area  Joint Swelling: No  Redness: No  Pain: YES  Warmth: No  Intensity:  moderate  Progression of Symptoms:  waxing and waning  Accompanying signs and symptoms:   Fevers: No  Numbness/tingling/weakness: No  History  Trauma to the area: No- no injuries  Recent illness:  No  Previous similar problem: YES  Previous evaluation:  No  Precipitating or alleviating factors:  Aggravating factors include: walking and climbing stairs  Therapies tried and outcome: acetaminophen and knee brace helped       Per Matthew message: This past weekend, we flew to [facility] and about three hours into the flight, my knee erupted in pain. No build up, just bam; sudden and intense pain that lasted for about half an hour. It hadn't been bothering me before we left, and nothing before that on the flight. It's been slightly agitated since, but I can manage that.       Review of Systems  Constitutional, HEENT, cardiovascular, pulmonary, gi and gu systems are negative, except as otherwise noted.        Objective    /88 (BP Location: Right arm, Patient Position: Sitting, Cuff Size: Adult Large)   Pulse 103   Temp 98.1  F (36.7  C) (Oral)   Resp 18   Ht 1.829 m (6')   Wt 105.2 kg (232 lb)   SpO2 96%   BMI 31.46 kg/m    Body mass index is 31.46 kg/m .      Physical Exam   GENERAL: alert and no distress  EYES: Eyes grossly normal to inspection, PERRL and conjunctivae and sclerae normal  MS: no gross musculoskeletal defects noted, no edema  ORTHO: Knee Exam: Inspection: AP/lateral alignment normal, No effusion, No quad atrophy  Tender: lateral joint line, medial joint line  Non-tender: lateral patellar facet, medial patellar facet, inferior pole patella, patella tendon, prepatellar bursa, popliteal region, pes anserine bursa  Active Range of Motion: full flexion  Strength: quad  5/5, Hamstrings  5/5, and Gastroc  5/5  Special tests: normal  Valgus stress test, normal Varus, negative Lachman's test, negative James's     Also examined: hip not tested   SKIN: no suspicious lesions or rashes  NEURO: Normal strength and tone, mentation intact and speech normal  PSYCH: mentation appears normal, affect normal/bright    Xray - Reviewed and interpreted by me.  Normal per my read with no significant degenerative changes.        Signed Electronically by: Devan Lyons PA-C

## 2024-12-11 NOTE — PATIENT INSTRUCTIONS
I recommend trying Voltaren (diclofenac) gel up to 4 times a day.    You can try applying ice 2-3 times a day for 15-20 minutes at a time.     You can also try 600 mg of ibuprofen 2-3 times a day for up to a month.

## 2024-12-12 ENCOUNTER — PATIENT OUTREACH (OUTPATIENT)
Dept: CARE COORDINATION | Facility: CLINIC | Age: 54
End: 2024-12-12
Payer: COMMERCIAL

## 2024-12-16 ENCOUNTER — PATIENT OUTREACH (OUTPATIENT)
Dept: CARE COORDINATION | Facility: CLINIC | Age: 54
End: 2024-12-16
Payer: COMMERCIAL

## 2024-12-19 ENCOUNTER — OFFICE VISIT (OUTPATIENT)
Dept: PHYSICAL MEDICINE AND REHAB | Facility: CLINIC | Age: 54
End: 2024-12-19
Payer: COMMERCIAL

## 2024-12-19 VITALS — HEART RATE: 113 BPM | DIASTOLIC BLOOD PRESSURE: 90 MMHG | SYSTOLIC BLOOD PRESSURE: 126 MMHG

## 2024-12-19 DIAGNOSIS — G43.719 CHRONIC MIGRAINE WITHOUT AURA, INTRACTABLE, WITHOUT STATUS MIGRAINOSUS: Primary | ICD-10-CM

## 2024-12-19 PROCEDURE — 64615 CHEMODENERV MUSC MIGRAINE: CPT | Performed by: PHYSICAL MEDICINE & REHABILITATION

## 2024-12-19 NOTE — LETTER
12/19/2024      Westley Singh  [address] [address] [facility] [address]      Dear Colleague,    Thank you for referring your patient, Westley Singh, to the [facility]. Please see a copy of my visit note below.      [facility]    PM&R CLINIC NOTE  BOTULINUM TOXIN PROCEDURE      HPI  Chief Complaint   Patient presents with     Procedure     Botox      Westley Singh is a 54 year old male with a history of post concussion syndrome and intractable migraine headaches who presents to clinic for botulinum toxin injections for management of chronic migraine headaches.     SINCE LAST VISIT  Westley Singh was last seen here in clinic on 9/19/2024, at which time he received 155 units of Botox.     Patient denies new medical diagnoses, illnesses, hospitalizations, emergency room visits, and injuries since the previous injection with botulinum neurotoxin.       RESPONSE TO PREVIOUS TREATMENT    Side effects: No problems reported.  Post-procedural headache: Yes. He did get a headache that afternoon, which lasted into the next day.     1.  Headache frequency during this injection cycle: This past round of Botox was amazing, and he only had a few migraines this entire injection cycle, which he attributed to other illnesses. This is compared to his baseline headache frequency of 30 headache days per month.     2.  Headache duration during this injection cycle:  Headache duration ranged from 2 hours to several days. Patient reports one episodes of multiple day headaches during this injection cycle.     3.  Headache intensity during this injection cycle:    A.  3/10  =  Typical pain level.  B.  7/10  =  Worst pain level.  C.  0/10  =  Lowest pain level.    4.  Change in headache medication usage during this injection cycle:  (For Example:  Able to decrease use of oral pain medications.) He has been using Nurtec as needed for migraine rescue, and this works exceptionally well. He  uses Tylenol for the more mild headaches.     5.  ER Visits During This Injection Cycle:  None.     6.  Functional Performance:  Change in ADL's, social interaction, days lost from work, etc. Patient reports being able to more fully participate in social and family activities and responsibilities as headache symptoms have improved      PHYSICAL EXAM  VS: BP (!) 126/90 (BP Location: Right arm, Patient Position: Sitting, Cuff Size: Adult Regular)   Pulse 113    GEN: Pleasant and cooperative, in no acute distress  HEENT: No facial asymmetry    ALLERGIES  Allergies   Allergen Reactions     Aspirin      Cephalosporins      Penicillins        CURRENT MEDICATIONS    Current Outpatient Medications:      albuterol (PROAIR HFA/PROVENTIL HFA/VENTOLIN HFA) 108 (90 Base) MCG/ACT inhaler, INHALE TWO PUFFS BY MOUT EVERY SIX HOURS AS NEEDED FOR SHORNTESS OF BREATH OF DYPSNEA, Disp: 8.5 g, Rfl: 0     amitriptyline (ELAVIL) 50 MG tablet, take 1 to 2 tablets by mouth at bedtime, Disp: 60 tablet, Rfl: 3     amLODIPine (NORVASC) 5 MG tablet, Take 1 tablet (5 mg) by mouth daily, Disp: 90 tablet, Rfl: 3     fluticasone-salmeterol (ADVAIR HFA) 115-21 MCG/ACT inhaler, INHALE 2 PUFFS INTO THE LUNGS 2 TIMES DAILY., Disp: 12 g, Rfl: 3     mometasone (NASONEX) 50 MCG/ACT nasal spray, , Disp: , Rfl:      rimegepant (NURTEC) 75 MG ODT tablet, Place 1 tablet (75 mg) under the tongue daily as needed for migraine Maximum of 1 tablet every 24 hours., Disp: 8 tablet, Rfl: 3     rizatriptan (MAXALT) 10 MG tablet, take 0.5 tablet by mouth at onset of headache for migraine. may repeat in 2 hours. max of 3 tablets by mouth in 24 hours., Disp: 10 tablet, Rfl: 3     tadalafil (CIALIS) 10 MG tablet, Take 1 tablet (10 mg) by mouth daily as needed Take 30 minutes prior to sexual activity, no more than once daily., Disp: 12 tablet, Rfl: 11     topiramate (TOPAMAX) 50 MG tablet, TAKE ONE TABLET BY MOUTH TWICE DAILY, Disp: 60 tablet, Rfl: 3    Current  Facility-Administered Medications:      botulinum toxin type A (BOTOX) 100 units injection 155 Units, 155 Units, Intramuscular, Q90 Days, Kim Long MD, 155 Units at 24 1341       BOTULINUM NEUROTOXIN INJECTION PROCEDURES    VERIFICATION OF PATIENT IDENTIFICATION AND PROCEDURE     Initials   Patient Name SES   Patient  SES   Procedure Verified by: SES     Prior to the start of the procedure and with procedural staff participation, I verbally confirmed the patient s identity using two indicators, relevant allergies, that the procedure was appropriate and matched the consent or emergent situation, and that the correct equipment/implants were available. Immediately prior to starting the procedure I conducted the Time Out with the procedural staff and re-confirmed the patient s name, procedure, and site/side. (The Joint Commission universal protocol was followed.)  Yes    Sedation (Moderate or Deep): None    ABOVE ASSESSMENTS PERFORMED BY    Kim Long MD      INDICATIONS FOR PROCEDURES  Westley Singh is a 54 year old patient with chronic migraine headaches which have been recalcitrant to oral medications and conservative therapy.  He is here today for injection with Botox.    GOAL OF PROCEDURE  The goal of this procedure is to increase active range of motion, improve volitional motor control and decrease pain .      TOTAL DOSE ADMINISTERED  Dose Administered:  155 units  Botox (Botulinum Toxin Type A)       2:1 Dilution   Unavoidable Drug Waste: Yes  Amount of drug waste (mL): 45 units Botox.  Reason for waste:  Single use vial  Diluent Used:  0.25% bupivacaine  Total Volume of Diluent Used:  3 ml  NDC #: Botox 100u ([id number])      CONSENT  The risks, benefits, and treatment options were discussed with Westley Singh and he agreed to proceed.    Written consent was obtained by [facility].     EQUIPMENT USED  Needle-30 gauge    SKIN PREPARATION  Skin preparation was performed using an alcohol  wipe.    GUIDANCE DESCRIPTION  Guidance was not utilized for this procedure       AREA/MUSCLE INJECTED: 155 UNITS BOTOX = TOTAL DOSE, 2:1 DILUTION  1. NECK & SHOULDER GIRDLE MUSCLES: 35 UNITS BOTOX = TOTAL DOSE, 2:1 DILUTION  Right Upper Trapezius (upper, mid & low cervical) - 7.5 units of Botox at 3 site/s.   Left Upper Trapezius (upper, mid & low cervical) - 7.5 units of Botox at 3 site/s.     Right Splenius - 5 units of Botox at 1 site/s.   Left Splenius - 5 units of Botox at 1 site/s.     Right Levator Scapulae - 5 units of Botox at 1 site/s.   Left Levator Scapulae - 5 units of Botox at 1 site/s.    3. FACIAL & SCALP MUSCLES: 120 UNITS BOTOX = TOTAL DOSE, 2:1 DILUTION  Right Frontalis - 10 units of Botox at 2 site/s.  Left Frontalis - 10 units of Botox at 2 site/s.    Right Temporalis - 25 units of Botox at 5 site/s.  Left Temporalis - 25 units of Botox at 5 site/s.    Right Occipitalis - 20 units of Botox at 4 site/s.   Left Occipitalis - 20 units of Botox at 4 site/s.     Right  - 2.5 units of Botox at 1 site/s.   Left  - 2.5 units of Botox at 1 site/s.    Procerus - 5 units of Botox at 1 site/s.       RESPONSE TO PROCEDURE  Westley Singh tolerated the procedure well and there were no immediate complications. He was allowed to recover for an appropriate period of time and was discharged home in stable condition.    ASSESSMENT AND PLAN   Botulinum toxin injections: No changes made to Botox dose or distribution today. Patient will continue to monitor response to Botox and report at next appointment.    Medications: No changes.   Referrals: None.   Follow up: Westley Singh was rescheduled for the next series of injections in 12 weeks, at which time we will evaluate response to today's injections. he may call the clinic prior with any questions or concerns prior to the next appointment.       Kim Long MD       Again, thank you for allowing me to participate in the care of your  patient.        Sincerely,        Kim Long MD    Electronically signed

## 2024-12-19 NOTE — PROGRESS NOTES
[facility], [place]    PM&R CLINIC NOTE  BOTULINUM TOXIN PROCEDURE      HPI  Chief Complaint   Patient presents with    Procedure     Botox      Westley Singh is a 54 year old male with a history of post concussion syndrome and intractable migraine headaches who presents to clinic for botulinum toxin injections for management of chronic migraine headaches.     SINCE LAST VISIT  Westley Singh was last seen here in clinic on 9/19/2024, at which time he received 155 units of Botox.     Patient denies new medical diagnoses, illnesses, hospitalizations, emergency room visits, and injuries since the previous injection with botulinum neurotoxin.       RESPONSE TO PREVIOUS TREATMENT    Side effects: No problems reported.  Post-procedural headache: Yes. He did get a headache that afternoon, which lasted into the next day.     1.  Headache frequency during this injection cycle: This past round of Botox was amazing, and he only had a few migraines this entire injection cycle, which he attributed to other illnesses. This is compared to his baseline headache frequency of 30 headache days per month.     2.  Headache duration during this injection cycle:  Headache duration ranged from 2 hours to several days. Patient reports one episodes of multiple day headaches during this injection cycle.     3.  Headache intensity during this injection cycle:    A.  3/10  =  Typical pain level.  B.  7/10  =  Worst pain level.  C.  0/10  =  Lowest pain level.    4.  Change in headache medication usage during this injection cycle:  (For Example:  Able to decrease use of oral pain medications.) He has been using Nurtec as needed for migraine rescue, and this works exceptionally well. He uses Tylenol for the more mild headaches.     5.  ER Visits During This Injection Cycle:  None.     6.  Functional Performance:  Change in ADL's, social interaction, days lost from work, etc. Patient reports being able to more fully participate in  social and family activities and responsibilities as headache symptoms have improved      PHYSICAL EXAM  VS: BP (!) 126/90 (BP Location: Right arm, Patient Position: Sitting, Cuff Size: Adult Regular)   Pulse 113    GEN: Pleasant and cooperative, in no acute distress  HEENT: No facial asymmetry    ALLERGIES  Allergies   Allergen Reactions    Aspirin     Cephalosporins     Penicillins        CURRENT MEDICATIONS    Current Outpatient Medications:     albuterol (PROAIR HFA/PROVENTIL HFA/VENTOLIN HFA) 108 (90 Base) MCG/ACT inhaler, INHALE TWO PUFFS BY MOUT EVERY SIX HOURS AS NEEDED FOR SHORNTESS OF BREATH OF DYPSNEA, Disp: 8.5 g, Rfl: 0    amitriptyline (ELAVIL) 50 MG tablet, take 1 to 2 tablets by mouth at bedtime, Disp: 60 tablet, Rfl: 3    amLODIPine (NORVASC) 5 MG tablet, Take 1 tablet (5 mg) by mouth daily, Disp: 90 tablet, Rfl: 3    fluticasone-salmeterol (ADVAIR HFA) 115-21 MCG/ACT inhaler, INHALE 2 PUFFS INTO THE LUNGS 2 TIMES DAILY., Disp: 12 g, Rfl: 3    mometasone (NASONEX) 50 MCG/ACT nasal spray, , Disp: , Rfl:     rimegepant (NURTEC) 75 MG ODT tablet, Place 1 tablet (75 mg) under the tongue daily as needed for migraine Maximum of 1 tablet every 24 hours., Disp: 8 tablet, Rfl: 3    rizatriptan (MAXALT) 10 MG tablet, take 0.5 tablet by mouth at onset of headache for migraine. may repeat in 2 hours. max of 3 tablets by mouth in 24 hours., Disp: 10 tablet, Rfl: 3    tadalafil (CIALIS) 10 MG tablet, Take 1 tablet (10 mg) by mouth daily as needed Take 30 minutes prior to sexual activity, no more than once daily., Disp: 12 tablet, Rfl: 11    topiramate (TOPAMAX) 50 MG tablet, TAKE ONE TABLET BY MOUTH TWICE DAILY, Disp: 60 tablet, Rfl: 3    Current Facility-Administered Medications:     botulinum toxin type A (BOTOX) 100 units injection 155 Units, 155 Units, Intramuscular, Q90 Days, Standal, Kim Johnson MD, 155 Units at 09/19/24 1341       BOTULINUM NEUROTOXIN INJECTION PROCEDURES    VERIFICATION OF PATIENT  IDENTIFICATION AND PROCEDURE     Initials   Patient Name SES   Patient  SES   Procedure Verified by: ISIS     Prior to the start of the procedure and with procedural staff participation, I verbally confirmed the patient s identity using two indicators, relevant allergies, that the procedure was appropriate and matched the consent or emergent situation, and that the correct equipment/implants were available. Immediately prior to starting the procedure I conducted the Time Out with the procedural staff and re-confirmed the patient s name, procedure, and site/side. (The Joint Commission universal protocol was followed.)  Yes    Sedation (Moderate or Deep): None    ABOVE ASSESSMENTS PERFORMED BY    Kim Long MD      INDICATIONS FOR PROCEDURES  Westley Singh is a 54 year old patient with chronic migraine headaches which have been recalcitrant to oral medications and conservative therapy.  He is here today for injection with Botox.    GOAL OF PROCEDURE  The goal of this procedure is to increase active range of motion, improve volitional motor control and decrease pain .      TOTAL DOSE ADMINISTERED  Dose Administered:  155 units  Botox (Botulinum Toxin Type A)       2:1 Dilution   Unavoidable Drug Waste: Yes  Amount of drug waste (mL): 45 units Botox.  Reason for waste:  Single use vial  Diluent Used:  0.25% bupivacaine  Total Volume of Diluent Used:  3 ml  NDC #: Botox 100u ([address]-09)      CONSENT  The risks, benefits, and treatment options were discussed with Westley Singh and he agreed to proceed.    Written consent was obtained by [facility].     EQUIPMENT USED  Needle-30 gauge    SKIN PREPARATION  Skin preparation was performed using an alcohol wipe.    GUIDANCE DESCRIPTION  Guidance was not utilized for this procedure       AREA/MUSCLE INJECTED: 155 UNITS BOTOX = TOTAL DOSE, 2:1 DILUTION  1. NECK & SHOULDER GIRDLE MUSCLES: 35 UNITS BOTOX = TOTAL DOSE, 2:1 DILUTION  Right Upper Trapezius (upper, mid & low  cervical) - 7.5 units of Botox at 3 site/s.   Left Upper Trapezius (upper, mid & low cervical) - 7.5 units of Botox at 3 site/s.     Right Splenius - 5 units of Botox at 1 site/s.   Left Splenius - 5 units of Botox at 1 site/s.     Right Levator Scapulae - 5 units of Botox at 1 site/s.   Left Levator Scapulae - 5 units of Botox at 1 site/s.    3. FACIAL & SCALP MUSCLES: 120 UNITS BOTOX = TOTAL DOSE, 2:1 DILUTION  Right Frontalis - 10 units of Botox at 2 site/s.  Left Frontalis - 10 units of Botox at 2 site/s.    Right Temporalis - 25 units of Botox at 5 site/s.  Left Temporalis - 25 units of Botox at 5 site/s.    Right Occipitalis - 20 units of Botox at 4 site/s.   Left Occipitalis - 20 units of Botox at 4 site/s.     Right  - 2.5 units of Botox at 1 site/s.   Left  - 2.5 units of Botox at 1 site/s.    Procerus - 5 units of Botox at 1 site/s.       RESPONSE TO PROCEDURE  Westley Singh tolerated the procedure well and there were no immediate complications. He was allowed to recover for an appropriate period of time and was discharged home in stable condition.    ASSESSMENT AND PLAN   Botulinum toxin injections: No changes made to Botox dose or distribution today. Patient will continue to monitor response to Botox and report at next appointment.    Medications: No changes.   Referrals: None.   Follow up: Westley Singh was rescheduled for the next series of injections in 12 weeks, at which time we will evaluate response to today's injections. he may call the clinic prior with any questions or concerns prior to the next appointment.       Kim Long MD

## 2025-01-08 DIAGNOSIS — G43.719 CHRONIC MIGRAINE WITHOUT AURA, INTRACTABLE, WITHOUT STATUS MIGRAINOSUS: ICD-10-CM

## 2025-01-08 RX ORDER — RIZATRIPTAN BENZOATE 10 MG/1
TABLET ORAL
Qty: 10 TABLET | Refills: 0 | Status: SHIPPED | OUTPATIENT
Start: 2025-01-08

## 2025-01-08 ASSESSMENT — ACTIVITIES OF DAILY LIVING (ADL)
KNEE_ACTIVITY_OF_DAILY_LIVING_SUM: 61
GO UP STAIRS: ACTIVITY IS MINIMALLY DIFFICULT
RISE FROM A CHAIR: ACTIVITY IS NOT DIFFICULT
LIMPING: I DO NOT HAVE THE SYMPTOM
AS_A_RESULT_OF_YOUR_KNEE_INJURY,_HOW_WOULD_YOU_RATE_YOUR_CURRENT_LEVEL_OF_DAILY_ACTIVITY?: NEARLY NORMAL
GO DOWN STAIRS: ACTIVITY IS SOMEWHAT DIFFICULT
SQUAT: ACTIVITY IS MINIMALLY DIFFICULT
SIT WITH YOUR KNEE BENT: ACTIVITY IS MINIMALLY DIFFICULT
RAW_SCORE: 61
WALK: ACTIVITY IS NOT DIFFICULT
KNEEL ON THE FRONT OF YOUR KNEE: ACTIVITY IS MINIMALLY DIFFICULT
KNEEL ON THE FRONT OF YOUR KNEE: ACTIVITY IS MINIMALLY DIFFICULT
HOW_WOULD_YOU_RATE_THE_OVERALL_FUNCTION_OF_YOUR_KNEE_DURING_YOUR_USUAL_DAILY_ACTIVITIES?: NEARLY NORMAL
SQUAT: ACTIVITY IS MINIMALLY DIFFICULT
PAIN: THE SYMPTOM AFFECTS MY ACTIVITY SLIGHTLY
WALK: ACTIVITY IS NOT DIFFICULT
GO UP STAIRS: ACTIVITY IS MINIMALLY DIFFICULT
WEAKNESS: I DO NOT HAVE THE SYMPTOM
KNEE_ACTIVITY_OF_DAILY_LIVING_SCORE: 87.14
PAIN: THE SYMPTOM AFFECTS MY ACTIVITY SLIGHTLY
RISE FROM A CHAIR: ACTIVITY IS NOT DIFFICULT
PLEASE_INDICATE_YOR_PRIMARY_REASON_FOR_REFERRAL_TO_THERAPY:: KNEE
STIFFNESS: I HAVE THE SYMPTOM BUT IT DOES NOT AFFECT MY ACTIVITY
SIT WITH YOUR KNEE BENT: ACTIVITY IS MINIMALLY DIFFICULT
WEAKNESS: I DO NOT HAVE THE SYMPTOM
LIMPING: I DO NOT HAVE THE SYMPTOM
HOW_WOULD_YOU_RATE_THE_CURRENT_FUNCTION_OF_YOUR_KNEE_DURING_YOUR_USUAL_DAILY_ACTIVITIES_ON_A_SCALE_FROM_0_TO_100_WITH_100_BEING_YOUR_LEVEL_OF_KNEE_FUNCTION_PRIOR_TO_YOUR_INJURY_AND_0_BEING_THE_INABILITY_TO_PERFORM_ANY_OF_YOUR_USUAL_DAILY_ACTIVITIES?: 85
AS_A_RESULT_OF_YOUR_KNEE_INJURY,_HOW_WOULD_YOU_RATE_YOUR_CURRENT_LEVEL_OF_DAILY_ACTIVITY?: NEARLY NORMAL
HOW_WOULD_YOU_RATE_THE_CURRENT_FUNCTION_OF_YOUR_KNEE_DURING_YOUR_USUAL_DAILY_ACTIVITIES_ON_A_SCALE_FROM_0_TO_100_WITH_100_BEING_YOUR_LEVEL_OF_KNEE_FUNCTION_PRIOR_TO_YOUR_INJURY_AND_0_BEING_THE_INABILITY_TO_PERFORM_ANY_OF_YOUR_USUAL_DAILY_ACTIVITIES?: 85
STIFFNESS: I HAVE THE SYMPTOM BUT IT DOES NOT AFFECT MY ACTIVITY
GO DOWN STAIRS: ACTIVITY IS SOMEWHAT DIFFICULT
SWELLING: I DO NOT HAVE THE SYMPTOM
STAND: ACTIVITY IS NOT DIFFICULT
GIVING WAY, BUCKLING OR SHIFTING OF KNEE: I DO NOT HAVE THE SYMPTOM
STAND: ACTIVITY IS NOT DIFFICULT
GIVING WAY, BUCKLING OR SHIFTING OF KNEE: I DO NOT HAVE THE SYMPTOM
SWELLING: I DO NOT HAVE THE SYMPTOM
HOW_WOULD_YOU_RATE_THE_OVERALL_FUNCTION_OF_YOUR_KNEE_DURING_YOUR_USUAL_DAILY_ACTIVITIES?: NEARLY NORMAL

## 2025-01-08 NOTE — TELEPHONE ENCOUNTER
Refill request for the following medication (s) listed below.    Pending Prescriptions:                       Disp   Refills    rizatriptan (MAXALT) 10 MG tablet [Pharma*10 tab*0            Sig: take 0.5 tablet by mouth at onset of headache for           migraine. may repeat in 2 hours. max of 3 tablets           by mouth in 24 hours.      Last office visit provider:  12/19/24  Next appointment scheduled: 3/20/25      Medication T'd for review and signature  Hilario MANN ATC on 1/8/2025 at 9:35 AM

## 2025-01-09 ENCOUNTER — THERAPY VISIT (OUTPATIENT)
Dept: PHYSICAL THERAPY | Facility: CLINIC | Age: 55
End: 2025-01-09
Payer: COMMERCIAL

## 2025-01-09 DIAGNOSIS — M25.562 ACUTE PAIN OF LEFT KNEE: Primary | ICD-10-CM

## 2025-01-09 NOTE — PROGRESS NOTES
"PHYSICAL THERAPY EVALUATION  Type of Visit: Evaluation        Fall Risk Screen:  Fall screen completed by: PT  Have you fallen 2 or more times in the past year?: No  Have you fallen and had an injury in the past year?: No  Is patient a fall risk?: No    Subjective         Presenting condition or subjective complaint: Main concern is left knee pain that started one year ago insidiously.  Intermittent pain.  One month ago he had severe knee pain (inside of knee) sitting on an airplane.  Lasted 20.\"  Did not have pain on the return flight.  Main concern is pain, limitation going up, down stairs, declines, unevern surfaces, and sitting > one hour.  Denies his knee \"giving out.\"  No hx of injury.  Date of onset:      Relevant medical history: Asthma; Concussions; Depression; Migraines or headaches; Thyroid problems   Dates & types of surgery:      Prior diagnostic imaging/testing results: X-ray; MRI (MRI today, 1/9/25)     Prior therapy history for the same diagnosis, illness or injury: No          Living Environment  Social support: With a significant other or spouse   Type of home: House; 2-story   Stairs to enter the home: Yes 2 Is there a railing: No     Ramp: No   Stairs inside the home: Yes 32 Is there a railing: Yes     Help at home: None  Equipment owned:       Employment: Yes   Hobbies/Interests: Travel, reading, dogs, walking, cabin         Objective   KNEE EVALUATION  PAIN: Pain Level at Rest: 2/10  Pain Level with Use: 7/10  INTEGUMENTARY (edema, incisions): WFL    GAIT:    Gait Deviations: WFL  BALANCE/PROPRIOCEPTION:  SLS, rt and lt 10 seconds.    WEIGHTBEARING ALIGNMENT: WFL  NON-WEIGHTBEARING ALIGNMENT: WFL  ROM:  knee.  Rt 0-0-126.  Lt 0-0121. + end range.      STRENGTH:  gross MMT Left 4+5. Hip abd, knee flex, ext.  Knee ext ++.    FLEXIBILITY:  HS left @ 60, rt 65.  PKB, left 105, rt 115.    SPECIAL TESTS:  RIT + knee ext, - knee flexion prone.    FUNCTIONAL TESTS: Double Leg Squat: " Anterior knee translation, Hip internal rotation, Improper use of glutes/hips, Impaired weight distribution, and limited rom.    PALPATION:  TTP left medial joint line.  Denies TTP medial hamstring, pes enserine.    JOINT MOBILITY: WFL    Assessment & Plan   CLINICAL IMPRESSIONS  Medical Diagnosis:      Treatment Diagnosis:     Impression/Assessment: Patient is a 54 year old male with left knee complaints.  The following significant findings have been identified: Pain, Decreased ROM/flexibility, Decreased strength, Impaired balance, and Decreased activity tolerance. These impairments interfere with their ability to perform work tasks, recreational activities, driving , and community mobility as compared to previous level of function.     Clinical Decision Making (Complexity):  Clinical Presentation: Stable/Uncomplicated  Clinical Presentation Rationale: based on medical and personal factors listed in PT evaluation  Clinical Decision Making (Complexity): Low complexity    PLAN OF CARE  Treatment Interventions:  Interventions: Neuromuscular Re-education, Therapeutic Activity, Therapeutic Exercise, Self-Care/Home Management    Long Term Goals            Frequency of Treatment:    Duration of Treatment:        Education Assessment:        Risks and benefits of evaluation/treatment have been explained.   Patient/Family/caregiver agrees with Plan of Care.     Evaluation Time:             Signing Clinician: Rodney Leonardo, PT

## 2025-01-12 RX ORDER — BUPIVACAINE HYDROCHLORIDE 2.5 MG/ML
3 INJECTION, SOLUTION EPIDURAL; INFILTRATION; INTRACAUDAL ONCE
Status: ACTIVE | OUTPATIENT
Start: 2025-01-12

## 2025-01-20 DIAGNOSIS — G43.719 CHRONIC MIGRAINE WITHOUT AURA, INTRACTABLE, WITHOUT STATUS MIGRAINOSUS: ICD-10-CM

## 2025-01-29 RX ORDER — TOPIRAMATE 50 MG/1
50 TABLET, FILM COATED ORAL 2 TIMES DAILY
Qty: 60 TABLET | Refills: 0 | Status: SHIPPED | OUTPATIENT
Start: 2025-01-29

## 2025-01-30 ENCOUNTER — THERAPY VISIT (OUTPATIENT)
Dept: PHYSICAL THERAPY | Facility: CLINIC | Age: 55
End: 2025-01-30
Payer: COMMERCIAL

## 2025-01-30 DIAGNOSIS — M25.562 ACUTE PAIN OF LEFT KNEE: Primary | ICD-10-CM

## 2025-02-05 ENCOUNTER — THERAPY VISIT (OUTPATIENT)
Dept: PHYSICAL THERAPY | Facility: CLINIC | Age: 55
End: 2025-02-05
Payer: COMMERCIAL

## 2025-02-05 DIAGNOSIS — M25.562 ACUTE PAIN OF LEFT KNEE: Primary | ICD-10-CM

## 2025-02-05 PROCEDURE — 97110 THERAPEUTIC EXERCISES: CPT | Mod: GP | Performed by: PHYSICAL THERAPIST

## 2025-02-07 DIAGNOSIS — G47.00 INSOMNIA, UNSPECIFIED TYPE: ICD-10-CM

## 2025-02-07 DIAGNOSIS — F07.81 POST CONCUSSION SYNDROME: ICD-10-CM

## 2025-02-13 RX ORDER — AMITRIPTYLINE HYDROCHLORIDE 50 MG/1
50-100 TABLET ORAL AT BEDTIME
Qty: 60 TABLET | Refills: 0 | Status: SHIPPED | OUTPATIENT
Start: 2025-02-13

## 2025-02-14 DIAGNOSIS — G43.719 CHRONIC MIGRAINE WITHOUT AURA, INTRACTABLE, WITHOUT STATUS MIGRAINOSUS: ICD-10-CM

## 2025-02-14 NOTE — TELEPHONE ENCOUNTER
Pending Prescriptions:                       Disp   Refills    rizatriptan (MAXALT) 10 MG tablet [Pharma*10 tab*0            Sig: take 0.5 tablet by mouth at onset of headache for           migraine. may repeat in 2 hours. max of 3 tablets           by mouth in 24 hours.    Medication T'd for review and signature

## 2025-02-16 RX ORDER — RIZATRIPTAN BENZOATE 10 MG/1
TABLET ORAL
Qty: 10 TABLET | Refills: 0 | Status: SHIPPED | OUTPATIENT
Start: 2025-02-16

## 2025-02-20 DIAGNOSIS — G43.719 CHRONIC MIGRAINE WITHOUT AURA, INTRACTABLE, WITHOUT STATUS MIGRAINOSUS: ICD-10-CM

## 2025-02-21 NOTE — TELEPHONE ENCOUNTER
Refill request received from [facility]    Medication:   topiramate (TOPAMAX) 50 MG tablet     Directions: TAKE ONE TABLET BY MOUTH TWICE DAILY   Last ordered: 1/29/25   Qty: #60  RF: 0  Last OV: 12/19/24  Next OV: 3/20/25    Routing to Dr. Long to review and sign if appropriate.    Violet Grimes, RN Care Coordinator  [facility]   [facility] main phone # [phone] fax # [phone]

## 2025-02-24 ENCOUNTER — TELEPHONE (OUTPATIENT)
Dept: GASTROENTEROLOGY | Facility: CLINIC | Age: 55
End: 2025-02-24
Payer: COMMERCIAL

## 2025-02-24 RX ORDER — TOPIRAMATE 50 MG/1
50 TABLET, FILM COATED ORAL 2 TIMES DAILY
Qty: 60 TABLET | Refills: 0 | Status: SHIPPED | OUTPATIENT
Start: 2025-02-24

## 2025-02-24 NOTE — TELEPHONE ENCOUNTER
"Endoscopy Scheduling Screen    Have you had any respiratory illness or flu-like symptoms in the last 10 days?  No    What is your communication preference for Instructions and/or Bowel Prep?   MyChart    What insurance is in the chart?  Other:  [facility]    Ordering/Referring Provider: Abby Fuentes APRN CNP     (If ordering provider performs procedure, schedule with ordering provider unless otherwise instructed. )    BMI: Estimated body mass index is 31.6 kg/m  as calculated from the following:    Height as of 2/21/25: 1.829 m (6').    Weight as of 2/21/25: 105.7 kg (233 lb).     Sedation Ordered  moderate sedation.   If patient BMI > 50 do not schedule in [facility].    If patient BMI > 45 do not schedule at [facility].    Are you taking methadone or Suboxone?  NO, No RN review required.    Have you been diagnosed and are being treated for severe PTSD or severe anxiety?  NO, No RN review required.    Are you taking any prescription medications for pain 3 or more times per week?   NO, No RN review required.    Do you have a history of malignant hyperthermia?  No    (Females) Are you currently pregnant?   No     Have you been diagnosed or told you have pulmonary hypertension?   No    Do you have an LVAD?  No    Have you been told you have moderate to severe sleep apnea?  No.    Have you been told you have COPD, asthma, or any other lung disease?  Yes     What breathing problems do you have?  Asthma     Do you use home oxygen?  No    Have your breathing problems required an ED visit or hospitalization in the last year?  No.    Do you  have a history of any heart conditions or any upcoming cardiac exams like an echo, angiogram, stress test, or ablation?  No     Have you ever had or are you waiting for an organ transplant?  No. Continue scheduling, no site restrictions.    Have you had a stroke or transient ischemic attack (TIA aka \"mini stroke\") in the last 2 years?   No.    Have you been diagnosed with or been told you " have cirrhosis of the liver?   No.    Are you currently on dialysis?   No    Do you need assistance transferring?   No    BMI: Estimated body mass index is 31.6 kg/m  as calculated from the following:    Height as of 2/21/25: 1.829 m (6').    Weight as of 2/21/25: 105.7 kg (233 lb).     Is patients BMI > 40 and scheduling location [facility]?  No    Do you take an injectable or oral medication for weight loss or diabetes (excluding insulin)?  No    Do you take the medication Naltrexone?  No    Do you take blood thinners?  No       Prep   Are you currently on dialysis or do you have chronic kidney disease?  No    Do you have a diagnosis of diabetes?  No    Do you have a diagnosis of cystic fibrosis (CF)?  No    On a regular basis do you go 3 -5 days between bowel movements?  No    BMI > 40?  No    Preferred Pharmacy:    [id number] - [place], [place] - [address]  [address]  [place] [address]  Phone: [phone] Fax: [phone]      Final Scheduling Details     Procedure scheduled  Colonoscopy    Surgeon:  eber     Date of procedure:  3/21     Pre-OP / PAC:   No - Not required for this site.    Location  [facility] - Patient preference.    Sedation   Moderate Sedation - Per order.      Patient Reminders:   You will receive a call from a Nurse to review instructions and health history.  This assessment must be completed prior to your procedure.  Failure to complete the Nurse assessment may result in the procedure being cancelled.      On the day of your procedure, please designate an adult(s) who can drive you home stay with you for the next 24 hours. The medicines used in the exam will make you sleepy. You will not be able to drive.      You cannot take public transportation, ride share services, or non-medical taxi service without a responsible caregiver.  Medical transport services are allowed with the requirement that a responsible caregiver will receive you at your destination.  We require that  drivers and caregivers are confirmed prior to your procedure.

## 2025-02-27 ENCOUNTER — THERAPY VISIT (OUTPATIENT)
Dept: PHYSICAL THERAPY | Facility: CLINIC | Age: 55
End: 2025-02-27
Payer: COMMERCIAL

## 2025-02-27 DIAGNOSIS — M25.562 ACUTE PAIN OF LEFT KNEE: Primary | ICD-10-CM

## 2025-03-05 ENCOUNTER — TELEPHONE (OUTPATIENT)
Dept: GASTROENTEROLOGY | Facility: CLINIC | Age: 55
End: 2025-03-05
Payer: COMMERCIAL

## 2025-03-05 NOTE — TELEPHONE ENCOUNTER
Pre visit planning completed.      Procedure details:    Patient scheduled for Colonoscopy on 03.21.2025.     Arrival time: 0915. Procedure time 1000    Facility location: [facility]; [address] [place] [address]. Check in location: Main entrance, door #1 on the North side of the building under roundabout awning. DO NOT GO TO SURGERY/ED ENTRANCE.     Sedation type: Conscious sedation     Pre op exam needed? No.    Indication for procedure:  Screen for colon cancer       Chart review:     Electronic implanted devices? No    Recent diagnosis of diverticulitis within the last 6 weeks? No      Medication review:    Diabetic? No    Anticoagulants? No    Weight loss medication/injectable? No GLP-1 medication per patient's medication list. Nursing to verify with pre-assessment call.    Other medication HOLDING recommendations:  N/A      Prep for procedure:     Bowel prep recommendation: Standard Miralax.   Due to: standard bowel prep    Procedure information and instructions sent via [employer]         Yeni Nguyen RN  Endoscopy Procedure Pre Assessment   [phone] option 3

## 2025-03-06 ENCOUNTER — TELEPHONE (OUTPATIENT)
Dept: FAMILY MEDICINE | Facility: CLINIC | Age: 55
End: 2025-03-06
Payer: COMMERCIAL

## 2025-03-06 NOTE — TELEPHONE ENCOUNTER
Attempted to contact patient in order to complete pre assessment questions.     No answer. Left message to return call to [phone] option 3.    Callback communication sent via [employer].    Tea Ryder RN

## 2025-03-06 NOTE — TELEPHONE ENCOUNTER
Patient Quality Outreach    Patient is due for the following:   Colon Cancer Screening    Action(s) Taken:   No follow up needed at this time.    Type of outreach:    Sent [employer] message.    Questions for provider review:    None           Silvina Diaz MA

## 2025-03-13 NOTE — TELEPHONE ENCOUNTER
Pre assessment completed for upcoming procedure.   (Please see previous telephone encounter notes for complete details)    Patient returned call.       Procedure details:    Arrival time and facility location reviewed.    Pre op exam needed? No.    Designated  policy reviewed. Instructed to have someone stay 6  hours post procedure.       Medication review:    Medications reviewed. Please see supporting documentation below. Holding recommendations discussed (if applicable).   Patient denies GLP-1    Prep for procedure:     Procedure prep instructions reviewed.        Any additional information needed:  N/A      Patient verbalized understanding and had no questions or concerns at this time.      Sheridan Munoz RN  Endoscopy Procedure Pre Assessment   [phone] option 3

## 2025-03-20 ENCOUNTER — OFFICE VISIT (OUTPATIENT)
Dept: PHYSICAL MEDICINE AND REHAB | Facility: CLINIC | Age: 55
End: 2025-03-20
Payer: COMMERCIAL

## 2025-03-20 ENCOUNTER — THERAPY VISIT (OUTPATIENT)
Dept: PHYSICAL THERAPY | Facility: CLINIC | Age: 55
End: 2025-03-20
Payer: COMMERCIAL

## 2025-03-20 VITALS — SYSTOLIC BLOOD PRESSURE: 144 MMHG | HEART RATE: 84 BPM | DIASTOLIC BLOOD PRESSURE: 94 MMHG

## 2025-03-20 DIAGNOSIS — M25.562 ACUTE PAIN OF LEFT KNEE: Primary | ICD-10-CM

## 2025-03-20 DIAGNOSIS — G43.719 CHRONIC MIGRAINE WITHOUT AURA, INTRACTABLE, WITHOUT STATUS MIGRAINOSUS: Primary | ICD-10-CM

## 2025-03-20 DIAGNOSIS — G47.00 INSOMNIA, UNSPECIFIED TYPE: ICD-10-CM

## 2025-03-20 DIAGNOSIS — F07.81 POST CONCUSSION SYNDROME: ICD-10-CM

## 2025-03-20 RX ORDER — TOPIRAMATE 50 MG/1
50 TABLET, FILM COATED ORAL 2 TIMES DAILY
Qty: 60 TABLET | Refills: 0 | Status: SHIPPED | OUTPATIENT
Start: 2025-03-20

## 2025-03-20 RX ORDER — RIZATRIPTAN BENZOATE 10 MG/1
TABLET ORAL
Qty: 10 TABLET | Refills: 0 | Status: SHIPPED | OUTPATIENT
Start: 2025-03-20

## 2025-03-20 RX ORDER — AMITRIPTYLINE HYDROCHLORIDE 50 MG/1
50-100 TABLET ORAL AT BEDTIME
Qty: 60 TABLET | Refills: 0 | Status: SHIPPED | OUTPATIENT
Start: 2025-03-20

## 2025-03-20 NOTE — LETTER
3/20/2025      Westley Singh  [id number] [facility] [id number]      Dear Colleague,    Thank you for referring your patient, Westley Singh, to the [facility]. Please see a copy of my visit note below.      [facility]    PM&R CLINIC NOTE  BOTULINUM TOXIN PROCEDURE      HPI  Chief Complaint   Patient presents with     Procedure     Botox     Westley Singh is a 54 year old male with a history of post concussion syndrome and intractable migraine headaches who presents to clinic for botulinum toxin injections for management of chronic migraine headaches.     SINCE LAST VISIT  Westley Singh was last seen here in clinic on 12/19/2024, at which time he received 155 units of Botox.     Patient denies new medical diagnoses, illnesses, hospitalizations, emergency room visits, and injuries since the previous injection with botulinum neurotoxin.       RESPONSE TO PREVIOUS TREATMENT    Side effects: No problems reported.  Post-procedural headache: Yes. He did get a headache that afternoon, which lasted into the next day.     1.  Headache frequency during this injection cycle: This past round of Botox was amazing, and he only had a few migraines this entire injection cycle, which he attributed to other illnesses and sleep deprivation. This is compared to his baseline headache frequency of 30 headache days per month.     2.  Headache duration during this injection cycle:  Headache duration ranged from 2 hours to several days. Patient reports one episodes of multiple day headaches during this injection cycle.     3.  Headache intensity during this injection cycle:    A.  3/10  =  Typical pain level.  B.  7/10  =  Worst pain level.  C.  0/10  =  Lowest pain level.    4.  Change in headache medication usage during this injection cycle:  (For Example:  Able to decrease use of oral pain medications.) He has been using Nurtec as needed for migraine rescue, and this works  exceptionally well. He uses Tylenol for the more mild headaches, and also has rizatriptan available as a prn.     5.  ER Visits During This Injection Cycle:  None.     6.  Functional Performance:  Change in ADL's, social interaction, days lost from work, etc. Patient reports being able to more fully participate in social and family activities and responsibilities as headache symptoms have improved      PHYSICAL EXAM  VS: BP (!) 144/94 (BP Location: Right arm, Patient Position: Sitting, Cuff Size: Adult Regular)   Pulse 84    GEN: Pleasant and cooperative, in no acute distress  HEENT: No facial asymmetry    ALLERGIES  Allergies   Allergen Reactions     Aspirin      Cephalosporins      Penicillins        CURRENT MEDICATIONS    Current Outpatient Medications:      albuterol (PROAIR HFA/PROVENTIL HFA/VENTOLIN HFA) 108 (90 Base) MCG/ACT inhaler, INHALE TWO PUFFS BY MOUT EVERY SIX HOURS AS NEEDED FOR SHORNTESS OF BREATH OF DYPSNEA, Disp: 8.5 g, Rfl: 0     amitriptyline (ELAVIL) 50 MG tablet, take 1-2 tablets by mouth at bedtime, Disp: 60 tablet, Rfl: 0     amLODIPine (NORVASC) 5 MG tablet, Take 1 tablet (5 mg) by mouth daily, Disp: 90 tablet, Rfl: 3     diclofenac (VOLTAREN) 75 MG EC tablet, Take 1 tablet (75 mg) by mouth 2 times daily as needed for moderate pain. Take one tab twice daily by mouth with meals for two weeks followed by as needed up to twice daily., Disp: 60 tablet, Rfl: 0     fluticasone-salmeterol (ADVAIR HFA) 115-21 MCG/ACT inhaler, INHALE 2 PUFFS INTO THE LUNGS 2 TIMES DAILY., Disp: 12 g, Rfl: 3     mometasone (NASONEX) 50 MCG/ACT nasal spray, , Disp: , Rfl:      rimegepant (NURTEC) 75 MG ODT tablet, Place 1 tablet (75 mg) under the tongue daily as needed for migraine Maximum of 1 tablet every 24 hours., Disp: 8 tablet, Rfl: 3     rizatriptan (MAXALT) 10 MG tablet, take 0.5 tablet by mouth at onset of headache for migraine. may repeat in 2 hours. max of 3 tablets by mouth in 24 hours., Disp: 10 tablet,  Rfl: 0     tadalafil (CIALIS) 10 MG tablet, Take 1 tablet (10 mg) by mouth daily as needed Take 30 minutes prior to sexual activity, no more than once daily., Disp: 12 tablet, Rfl: 11     topiramate (TOPAMAX) 50 MG tablet, TAKE ONE TABLET BY MOUTH TWICE DAILY, Disp: 60 tablet, Rfl: 0    Current Facility-Administered Medications:      botulinum toxin type A (BOTOX) 100 units injection 155 Units, 155 Units, Intramuscular, Q90 Days, Kim Long MD, 155 Units at 24 1234     bupivacaine (MARCAINE) 0.25% preservative free injection, 3 mL, Other, Once,        BOTULINUM NEUROTOXIN INJECTION PROCEDURES    VERIFICATION OF PATIENT IDENTIFICATION AND PROCEDURE     Initials   Patient Name SES   Patient  SES   Procedure Verified by: SES     Prior to the start of the procedure and with procedural staff participation, I verbally confirmed the patient s identity using two indicators, relevant allergies, that the procedure was appropriate and matched the consent or emergent situation, and that the correct equipment/implants were available. Immediately prior to starting the procedure I conducted the Time Out with the procedural staff and re-confirmed the patient s name, procedure, and site/side. (The Joint Commission universal protocol was followed.)  Yes    Sedation (Moderate or Deep): None    ABOVE ASSESSMENTS PERFORMED BY    Kim Long MD      INDICATIONS FOR PROCEDURES  Westlye Singh is a 54 year old patient with chronic migraine headaches which have been recalcitrant to oral medications and conservative therapy.  He is here today for injection with Botox.    GOAL OF PROCEDURE  The goal of this procedure is to increase active range of motion, improve volitional motor control and decrease pain.      TOTAL DOSE ADMINISTERED  Dose Administered:  155 units  Botox (Botulinum Toxin Type A)       2:1 Dilution   Unavoidable Drug Waste: Yes  Amount of drug waste (mL): 45 units Botox.  Reason for waste:  Single  use vial  Diluent Used:  0.25% bupivacaine  Total Volume of Diluent Used:  3 ml  NDC #: Botox 100u ([address]-34)      CONSENT  The risks, benefits, and treatment options were discussed with Westley Singh and he agreed to proceed.    Written consent was obtained by [facility].     EQUIPMENT USED  Needle-30 gauge    SKIN PREPARATION  Skin preparation was performed using an alcohol wipe.    GUIDANCE DESCRIPTION  Guidance was not utilized for this procedure       AREA/MUSCLE INJECTED: 155 UNITS BOTOX = TOTAL DOSE, 2:1 DILUTION  1. NECK & SHOULDER GIRDLE MUSCLES: 35 UNITS BOTOX = TOTAL DOSE, 2:1 DILUTION  Right Upper Trapezius (upper, mid & low cervical) - 7.5 units of Botox at 3 site/s.   Left Upper Trapezius (upper, mid & low cervical) - 7.5 units of Botox at 3 site/s.     Right Splenius - 5 units of Botox at 1 site/s.   Left Splenius - 5 units of Botox at 1 site/s.     Right Levator Scapulae - 5 units of Botox at 1 site/s.   Left Levator Scapulae - 5 units of Botox at 1 site/s.    3. FACIAL & SCALP MUSCLES: 120 UNITS BOTOX = TOTAL DOSE, 2:1 DILUTION  Right Frontalis - 10 units of Botox at 2 site/s.  Left Frontalis - 10 units of Botox at 2 site/s.    Right Temporalis - 25 units of Botox at 5 site/s.  Left Temporalis - 25 units of Botox at 5 site/s.    Right Occipitalis - 20 units of Botox at 4 site/s.   Left Occipitalis - 20 units of Botox at 4 site/s.     Right  - 2.5 units of Botox at 1 site/s.   Left  - 2.5 units of Botox at 1 site/s.    Procerus - 5 units of Botox at 1 site/s.       RESPONSE TO PROCEDURE  Westley Singh tolerated the procedure well and there were no immediate complications. He was allowed to recover for an appropriate period of time and was discharged home in stable condition.    ASSESSMENT AND PLAN   Botulinum toxin injections: No changes made to Botox dose or distribution today. Patient will continue to monitor response to Botox and report at next appointment.    Medications: Refills  provided for topiramate, rizatriptan, amitriptyline, and Nurtec. I will take over prescribing these medications, as he does not follow with Neurology regularly anymore given the stability of his migraine headaches.   Referrals: None.   Follow up: Westley Singh was rescheduled for the next series of injections in 12 weeks, at which time we will evaluate response to today's injections. he may call the clinic prior with any questions or concerns prior to the next appointment.       Kim Long MD       Again, thank you for allowing me to participate in the care of your patient.        Sincerely,        Kim Long MD    Electronically signed

## 2025-03-20 NOTE — PROGRESS NOTES
[facility]    PM&R CLINIC NOTE  BOTULINUM TOXIN PROCEDURE      HPI  Chief Complaint   Patient presents with    Procedure     Botox     Westley Singh is a 54 year old male with a history of post concussion syndrome and intractable migraine headaches who presents to clinic for botulinum toxin injections for management of chronic migraine headaches.     SINCE LAST VISIT  Westley Singh was last seen here in clinic on 12/19/2024, at which time he received 155 units of Botox.     Patient denies new medical diagnoses, illnesses, hospitalizations, emergency room visits, and injuries since the previous injection with botulinum neurotoxin.       RESPONSE TO PREVIOUS TREATMENT    Side effects: No problems reported.  Post-procedural headache: Yes. He did get a headache that afternoon, which lasted into the next day.     1.  Headache frequency during this injection cycle: This past round of Botox was amazing, and he only had a few migraines this entire injection cycle, which he attributed to other illnesses and sleep deprivation. This is compared to his baseline headache frequency of 30 headache days per month.     2.  Headache duration during this injection cycle:  Headache duration ranged from 2 hours to several days. Patient reports one episodes of multiple day headaches during this injection cycle.     3.  Headache intensity during this injection cycle:    A.  3/10  =  Typical pain level.  B.  7/10  =  Worst pain level.  C.  0/10  =  Lowest pain level.    4.  Change in headache medication usage during this injection cycle:  (For Example:  Able to decrease use of oral pain medications.) He has been using Nurtec as needed for migraine rescue, and this works exceptionally well. He uses Tylenol for the more mild headaches, and also has rizatriptan available as a prn.     5.  ER Visits During This Injection Cycle:  None.     6.  Functional Performance:  Change in ADL's, social interaction, days lost from  work, etc. Patient reports being able to more fully participate in social and family activities and responsibilities as headache symptoms have improved      PHYSICAL EXAM  VS: BP (!) 144/94 (BP Location: Right arm, Patient Position: Sitting, Cuff Size: Adult Regular)   Pulse 84    GEN: Pleasant and cooperative, in no acute distress  HEENT: No facial asymmetry    ALLERGIES  Allergies   Allergen Reactions    Aspirin     Cephalosporins     Penicillins        CURRENT MEDICATIONS    Current Outpatient Medications:     albuterol (PROAIR HFA/PROVENTIL HFA/VENTOLIN HFA) 108 (90 Base) MCG/ACT inhaler, INHALE TWO PUFFS BY MOUT EVERY SIX HOURS AS NEEDED FOR SHORNTESS OF BREATH OF DYPSNEA, Disp: 8.5 g, Rfl: 0    amitriptyline (ELAVIL) 50 MG tablet, take 1-2 tablets by mouth at bedtime, Disp: 60 tablet, Rfl: 0    amLODIPine (NORVASC) 5 MG tablet, Take 1 tablet (5 mg) by mouth daily, Disp: 90 tablet, Rfl: 3    diclofenac (VOLTAREN) 75 MG EC tablet, Take 1 tablet (75 mg) by mouth 2 times daily as needed for moderate pain. Take one tab twice daily by mouth with meals for two weeks followed by as needed up to twice daily., Disp: 60 tablet, Rfl: 0    fluticasone-salmeterol (ADVAIR HFA) 115-21 MCG/ACT inhaler, INHALE 2 PUFFS INTO THE LUNGS 2 TIMES DAILY., Disp: 12 g, Rfl: 3    mometasone (NASONEX) 50 MCG/ACT nasal spray, , Disp: , Rfl:     rimegepant (NURTEC) 75 MG ODT tablet, Place 1 tablet (75 mg) under the tongue daily as needed for migraine Maximum of 1 tablet every 24 hours., Disp: 8 tablet, Rfl: 3    rizatriptan (MAXALT) 10 MG tablet, take 0.5 tablet by mouth at onset of headache for migraine. may repeat in 2 hours. max of 3 tablets by mouth in 24 hours., Disp: 10 tablet, Rfl: 0    tadalafil (CIALIS) 10 MG tablet, Take 1 tablet (10 mg) by mouth daily as needed Take 30 minutes prior to sexual activity, no more than once daily., Disp: 12 tablet, Rfl: 11    topiramate (TOPAMAX) 50 MG tablet, TAKE ONE TABLET BY MOUTH TWICE DAILY,  Disp: 60 tablet, Rfl: 0    Current Facility-Administered Medications:     botulinum toxin type A (BOTOX) 100 units injection 155 Units, 155 Units, Intramuscular, Q90 Days, Kim Long MD, 155 Units at 24 1234    bupivacaine (MARCAINE) 0.25% preservative free injection, 3 mL, Other, Once,        BOTULINUM NEUROTOXIN INJECTION PROCEDURES    VERIFICATION OF PATIENT IDENTIFICATION AND PROCEDURE     Initials   Patient Name SES   Patient  SES   Procedure Verified by: SES     Prior to the start of the procedure and with procedural staff participation, I verbally confirmed the patient s identity using two indicators, relevant allergies, that the procedure was appropriate and matched the consent or emergent situation, and that the correct equipment/implants were available. Immediately prior to starting the procedure I conducted the Time Out with the procedural staff and re-confirmed the patient s name, procedure, and site/side. (The Joint Commission universal protocol was followed.)  Yes    Sedation (Moderate or Deep): None    ABOVE ASSESSMENTS PERFORMED BY    Kim Long MD      INDICATIONS FOR PROCEDURES  Westley Singh is a 54 year old patient with chronic migraine headaches which have been recalcitrant to oral medications and conservative therapy.  He is here today for injection with Botox.    GOAL OF PROCEDURE  The goal of this procedure is to increase active range of motion, improve volitional motor control and decrease pain.      TOTAL DOSE ADMINISTERED  Dose Administered:  155 units  Botox (Botulinum Toxin Type A)       2:1 Dilution   Unavoidable Drug Waste: Yes  Amount of drug waste (mL): 45 units Botox.  Reason for waste:  Single use vial  Diluent Used:  0.25% bupivacaine  Total Volume of Diluent Used:  3 ml  NDC #: Botox 100u ([address]-58)      CONSENT  The risks, benefits, and treatment options were discussed with Westley Singh and he agreed to proceed.    Written consent was obtained  by [employer].     EQUIPMENT USED  Needle-30 gauge    SKIN PREPARATION  Skin preparation was performed using an alcohol wipe.    GUIDANCE DESCRIPTION  Guidance was not utilized for this procedure       AREA/MUSCLE INJECTED: 155 UNITS BOTOX = TOTAL DOSE, 2:1 DILUTION  1. NECK & SHOULDER GIRDLE MUSCLES: 35 UNITS BOTOX = TOTAL DOSE, 2:1 DILUTION  Right Upper Trapezius (upper, mid & low cervical) - 7.5 units of Botox at 3 site/s.   Left Upper Trapezius (upper, mid & low cervical) - 7.5 units of Botox at 3 site/s.     Right Splenius - 5 units of Botox at 1 site/s.   Left Splenius - 5 units of Botox at 1 site/s.     Right Levator Scapulae - 5 units of Botox at 1 site/s.   Left Levator Scapulae - 5 units of Botox at 1 site/s.    3. FACIAL & SCALP MUSCLES: 120 UNITS BOTOX = TOTAL DOSE, 2:1 DILUTION  Right Frontalis - 10 units of Botox at 2 site/s.  Left Frontalis - 10 units of Botox at 2 site/s.    Right Temporalis - 25 units of Botox at 5 site/s.  Left Temporalis - 25 units of Botox at 5 site/s.    Right Occipitalis - 20 units of Botox at 4 site/s.   Left Occipitalis - 20 units of Botox at 4 site/s.     Right  - 2.5 units of Botox at 1 site/s.   Left  - 2.5 units of Botox at 1 site/s.    Procerus - 5 units of Botox at 1 site/s.       RESPONSE TO PROCEDURE  Westley Singh tolerated the procedure well and there were no immediate complications. He was allowed to recover for an appropriate period of time and was discharged home in stable condition.    ASSESSMENT AND PLAN   Botulinum toxin injections: No changes made to Botox dose or distribution today. Patient will continue to monitor response to Botox and report at next appointment.    Medications: Refills provided for topiramate, rizatriptan, amitriptyline, and Nurtec.   Referrals: None.   Follow up: Westley Singh was rescheduled for the next series of injections in 12 weeks, at which time we will evaluate response to today's injections. he may call the clinic  prior with any questions or concerns prior to the next appointment.       Kim Long MD

## 2025-03-21 ENCOUNTER — HOSPITAL ENCOUNTER (OUTPATIENT)
Facility: CLINIC | Age: 55
Discharge: HOME | End: 2025-03-21
Attending: INTERNAL MEDICINE | Admitting: INTERNAL MEDICINE
Payer: COMMERCIAL

## 2025-03-21 VITALS
OXYGEN SATURATION: 98 % | RESPIRATION RATE: 20 BRPM | HEART RATE: 83 BPM | DIASTOLIC BLOOD PRESSURE: 82 MMHG | SYSTOLIC BLOOD PRESSURE: 112 MMHG

## 2025-03-21 LAB — COLONOSCOPY: NORMAL

## 2025-03-21 PROCEDURE — 45380 COLONOSCOPY AND BIOPSY: CPT | Performed by: INTERNAL MEDICINE

## 2025-03-21 PROCEDURE — 45385 COLONOSCOPY W/LESION REMOVAL: CPT | Performed by: INTERNAL MEDICINE

## 2025-03-21 PROCEDURE — 88305 TISSUE EXAM BY PATHOLOGIST: CPT | Mod: TC | Performed by: INTERNAL MEDICINE

## 2025-03-21 PROCEDURE — G0500 MOD SEDAT ENDO SERVICE >5YRS: HCPCS | Performed by: INTERNAL MEDICINE

## 2025-03-21 PROCEDURE — 250N000011 HC RX IP 250 OP 636: Performed by: INTERNAL MEDICINE

## 2025-03-21 RX ORDER — NALOXONE HYDROCHLORIDE 0.4 MG/ML
0.4 INJECTION, SOLUTION INTRAMUSCULAR; INTRAVENOUS; SUBCUTANEOUS
Status: DISCONTINUED | OUTPATIENT
Start: 2025-03-21 | End: 2025-03-21 | Stop reason: HOSPADM

## 2025-03-21 RX ORDER — NALOXONE HYDROCHLORIDE 0.4 MG/ML
0.2 INJECTION, SOLUTION INTRAMUSCULAR; INTRAVENOUS; SUBCUTANEOUS
Status: DISCONTINUED | OUTPATIENT
Start: 2025-03-21 | End: 2025-03-21 | Stop reason: HOSPADM

## 2025-03-21 RX ORDER — SIMETHICONE 40MG/0.6ML
133 SUSPENSION, DROPS(FINAL DOSAGE FORM)(ML) ORAL
Status: DISCONTINUED | OUTPATIENT
Start: 2025-03-21 | End: 2025-03-21 | Stop reason: HOSPADM

## 2025-03-21 RX ORDER — FENTANYL CITRATE 50 UG/ML
25-100 INJECTION, SOLUTION INTRAMUSCULAR; INTRAVENOUS EVERY 5 MIN PRN
Status: DISCONTINUED | OUTPATIENT
Start: 2025-03-21 | End: 2025-03-21 | Stop reason: HOSPADM

## 2025-03-21 RX ORDER — ONDANSETRON 2 MG/ML
4 INJECTION INTRAMUSCULAR; INTRAVENOUS
Status: DISCONTINUED | OUTPATIENT
Start: 2025-03-21 | End: 2025-03-21 | Stop reason: HOSPADM

## 2025-03-21 RX ORDER — LIDOCAINE 40 MG/G
CREAM TOPICAL
Status: DISCONTINUED | OUTPATIENT
Start: 2025-03-21 | End: 2025-03-21 | Stop reason: HOSPADM

## 2025-03-21 RX ORDER — ONDANSETRON 4 MG/1
4 TABLET, ORALLY DISINTEGRATING ORAL EVERY 6 HOURS PRN
Status: DISCONTINUED | OUTPATIENT
Start: 2025-03-21 | End: 2025-03-21 | Stop reason: HOSPADM

## 2025-03-21 RX ORDER — EPINEPHRINE 1 MG/ML
0.1 INJECTION, SOLUTION, CONCENTRATE INTRAVENOUS
Status: DISCONTINUED | OUTPATIENT
Start: 2025-03-21 | End: 2025-03-21 | Stop reason: HOSPADM

## 2025-03-21 RX ORDER — FLUMAZENIL 0.1 MG/ML
0.2 INJECTION, SOLUTION INTRAVENOUS
Status: DISCONTINUED | OUTPATIENT
Start: 2025-03-21 | End: 2025-03-21 | Stop reason: HOSPADM

## 2025-03-21 RX ORDER — ONDANSETRON 2 MG/ML
4 INJECTION INTRAMUSCULAR; INTRAVENOUS EVERY 6 HOURS PRN
Status: DISCONTINUED | OUTPATIENT
Start: 2025-03-21 | End: 2025-03-21 | Stop reason: HOSPADM

## 2025-03-21 RX ORDER — PROCHLORPERAZINE MALEATE 10 MG
10 TABLET ORAL EVERY 6 HOURS PRN
Status: DISCONTINUED | OUTPATIENT
Start: 2025-03-21 | End: 2025-03-21 | Stop reason: HOSPADM

## 2025-03-21 RX ORDER — DIPHENHYDRAMINE HYDROCHLORIDE 50 MG/ML
25-50 INJECTION, SOLUTION INTRAMUSCULAR; INTRAVENOUS
Status: DISCONTINUED | OUTPATIENT
Start: 2025-03-21 | End: 2025-03-21 | Stop reason: HOSPADM

## 2025-03-21 RX ORDER — ATROPINE SULFATE 0.1 MG/ML
1 INJECTION INTRAVENOUS
Status: DISCONTINUED | OUTPATIENT
Start: 2025-03-21 | End: 2025-03-21 | Stop reason: HOSPADM

## 2025-03-21 RX ADMIN — MIDAZOLAM 2 MG: 1 INJECTION INTRAMUSCULAR; INTRAVENOUS at 10:21

## 2025-03-21 RX ADMIN — MIDAZOLAM 2 MG: 1 INJECTION INTRAMUSCULAR; INTRAVENOUS at 10:19

## 2025-03-21 RX ADMIN — FENTANYL CITRATE 50 MCG: 50 INJECTION, SOLUTION INTRAMUSCULAR; INTRAVENOUS at 10:19

## 2025-03-21 RX ADMIN — FENTANYL CITRATE 50 MCG: 50 INJECTION, SOLUTION INTRAMUSCULAR; INTRAVENOUS at 10:21

## 2025-03-21 ASSESSMENT — ACTIVITIES OF DAILY LIVING (ADL)
ADLS_ACUITY_SCORE: 41
ADLS_ACUITY_SCORE: 41

## 2025-03-21 NOTE — H&P
[facility]  Pre-Endoscopy History and Physical     Westley Singh MRN# [id number]   YOB: 1970 Age: 54 year old     Date of Procedure: 3/21/2025  Primary care provider: Abby Fuentes  Type of Endoscopy: colonoscopy  Reason for Procedure: screening  Type of Anesthesia Anticipated: Moderate Sedation    HPI:    Westley is a 54 year old male who will be undergoing the above procedure.      A history and physical has been performed. The patient's medications and allergies have been reviewed. The risks and benefits of the procedure and the sedation options and risks were discussed with the patient.  All questions were answered and informed consent was obtained.      He denies a personal or family history of anesthesia complications or bleeding disorders.     Allergies   Allergen Reactions    Aspirin     Cephalosporins     Penicillins         Prior to Admission Medications   Prescriptions Last Dose Informant Patient Reported? Taking?   albuterol (PROAIR HFA/PROVENTIL HFA/VENTOLIN HFA) 108 (90 Base) MCG/ACT inhaler   No No   Sig: INHALE TWO PUFFS BY MOUT EVERY SIX HOURS AS NEEDED FOR SHORNTESS OF BREATH OF DYPSNEA   amLODIPine (NORVASC) 5 MG tablet   No No   Sig: Take 1 tablet (5 mg) by mouth daily   amitriptyline (ELAVIL) 50 MG tablet   No No   Sig: Take 1-2 tablets ( mg) by mouth at bedtime.   diclofenac (VOLTAREN) 75 MG EC tablet   No No   Sig: Take 1 tablet (75 mg) by mouth 2 times daily as needed for moderate pain. Take one tab twice daily by mouth with meals for two weeks followed by as needed up to twice daily.   fluticasone-salmeterol (ADVAIR HFA) 115-21 MCG/ACT inhaler   No No   Sig: INHALE 2 PUFFS INTO THE LUNGS 2 TIMES DAILY.   mometasone (NASONEX) 50 MCG/ACT nasal spray   Yes No   rimegepant (NURTEC) 75 MG ODT tablet   No No   Sig: Place 1 tablet (75 mg) under the tongue daily as needed for migraine. Maximum of 1 tablet every 24 hours.   rizatriptan (MAXALT) 10 MG tablet    "No No   Sig: TAKE 0.5 TABLET BY MOUTH AT ONSET OF HEADACHE FOR MIGRAINE. MAY REPEAT IN 2 HOURS. MAX OF 3 TABLETS BY MOUTH IN 24 HOURS.   tadalafil (CIALIS) 10 MG tablet   No No   Sig: Take 1 tablet (10 mg) by mouth daily as needed Take 30 minutes prior to sexual activity, no more than once daily.   topiramate (TOPAMAX) 50 MG tablet   No No   Sig: Take 1 tablet (50 mg) by mouth 2 times daily.      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    Acute pain of left knee        Past Medical History:   Diagnosis Date    Anxiety     Asthma     Depression     Depressive disorder     Hypertension 2009    On the high end of \"normal\" 120/80s    Kidney infection 2009    Hospitalized x 2    Thyroid disease     Hashimoto's diagnosis         Past Surgical History:   Procedure Laterality Date    ENT SURGERY      nasal polyps times 2    GENITOURINARY SURGERY  2009    Vascetomy    HERNIA REPAIR      under 3 years old    MAMMOPLASTY REDUCTION  age 19       Social History     Tobacco Use    Smoking status: Former     Current packs/day: 0.00     Types: Cigarettes, Cigars     Start date: 2007     Quit date: 2016     Years since quittin.5     Passive exposure: Past    Smokeless tobacco: Never    Tobacco comments:     Originally smoked from  to , restarted again .   Substance Use Topics    Alcohol use: Yes     Comment: occasional use       Family History   Problem " Relation Age of Onset    Hypertension Mother     Thyroid Disease Mother         Graves Disease / Hyperactive    Heart Disease Father     Diabetes Son        REVIEW OF SYSTEMS:     5 point ROS negative except as noted above in HPI, including Gen., Resp., CV, GI &  system review.      PHYSICAL EXAM:   There were no vitals taken for this visit. Estimated body mass index is 31.6 kg/m  as calculated from the following:    Height as of 2/21/25: 1.829 m (6').    Weight as of 2/21/25: 105.7 kg (233 lb).   GENERAL APPEARANCE: healthy, alert, and no distress  MENTAL STATUS: alert  AIRWAY EXAM: Mallampatti Class II (visualization of the soft palate, fauces, and uvula)  RESP: lungs clear to auscultation - no rales, rhonchi or wheezes  CV: regular rates and rhythm      DIAGNOSTICS:    Not indicated      IMPRESSION   ASA Class 2 - Mild systemic disease        PLAN:       Plan for colonoscopy. We discussed the risks, benefits and alternatives and the patient wished to proceed.    The above has been forwarded to the consulting provider.      Signed Electronically by: Sebastián Orozco MD  March 21, 2025    Sebastián Orozco MD  [facility]  Cell: [phone]  Office Phone: [phone]  Office Fax: [phone]

## 2025-03-25 LAB
PATH REPORT.COMMENTS IMP SPEC: NORMAL
PATH REPORT.COMMENTS IMP SPEC: NORMAL
PATH REPORT.FINAL DX SPEC: NORMAL
PATH REPORT.GROSS SPEC: NORMAL
PATH REPORT.MICROSCOPIC SPEC OTHER STN: NORMAL
PATH REPORT.RELEVANT HX SPEC: NORMAL
PHOTO IMAGE: NORMAL

## 2025-03-25 PROCEDURE — 88305 TISSUE EXAM BY PATHOLOGIST: CPT | Mod: 26

## 2025-04-03 ENCOUNTER — THERAPY VISIT (OUTPATIENT)
Dept: PHYSICAL THERAPY | Facility: CLINIC | Age: 55
End: 2025-04-03
Payer: COMMERCIAL

## 2025-04-03 DIAGNOSIS — M25.562 ACUTE PAIN OF LEFT KNEE: Primary | ICD-10-CM

## 2025-04-04 PROBLEM — D12.6 COLON ADENOMAS: Status: ACTIVE | Noted: 2025-04-04

## 2025-04-07 ENCOUNTER — PATIENT OUTREACH (OUTPATIENT)
Dept: GASTROENTEROLOGY | Facility: CLINIC | Age: 55
End: 2025-04-07
Payer: COMMERCIAL

## 2025-04-09 ENCOUNTER — PATIENT OUTREACH (OUTPATIENT)
Dept: CARE COORDINATION | Facility: CLINIC | Age: 55
End: 2025-04-09
Payer: COMMERCIAL

## 2025-04-10 ENCOUNTER — THERAPY VISIT (OUTPATIENT)
Dept: PHYSICAL THERAPY | Facility: CLINIC | Age: 55
End: 2025-04-10
Payer: COMMERCIAL

## 2025-04-10 DIAGNOSIS — M25.562 ACUTE PAIN OF LEFT KNEE: Primary | ICD-10-CM

## 2025-04-20 SDOH — HEALTH STABILITY: PHYSICAL HEALTH: ON AVERAGE, HOW MANY DAYS PER WEEK DO YOU ENGAGE IN MODERATE TO STRENUOUS EXERCISE (LIKE A BRISK WALK)?: 6 DAYS

## 2025-04-20 SDOH — HEALTH STABILITY: PHYSICAL HEALTH: ON AVERAGE, HOW MANY MINUTES DO YOU ENGAGE IN EXERCISE AT THIS LEVEL?: 40 MIN

## 2025-04-20 ASSESSMENT — SOCIAL DETERMINANTS OF HEALTH (SDOH): HOW OFTEN DO YOU GET TOGETHER WITH FRIENDS OR RELATIVES?: ONCE A WEEK

## 2025-04-21 ENCOUNTER — OFFICE VISIT (OUTPATIENT)
Dept: FAMILY MEDICINE | Facility: CLINIC | Age: 55
End: 2025-04-21
Attending: NURSE PRACTITIONER
Payer: COMMERCIAL

## 2025-04-21 VITALS
WEIGHT: 231 LBS | HEART RATE: 87 BPM | HEIGHT: 72 IN | SYSTOLIC BLOOD PRESSURE: 115 MMHG | BODY MASS INDEX: 31.29 KG/M2 | RESPIRATION RATE: 18 BRPM | TEMPERATURE: 98.2 F | OXYGEN SATURATION: 99 % | DIASTOLIC BLOOD PRESSURE: 81 MMHG

## 2025-04-21 DIAGNOSIS — Z88.0 HISTORY OF PENICILLIN ALLERGY: ICD-10-CM

## 2025-04-21 DIAGNOSIS — Z12.5 SCREENING FOR PROSTATE CANCER: ICD-10-CM

## 2025-04-21 DIAGNOSIS — Z00.00 ROUTINE GENERAL MEDICAL EXAMINATION AT A HEALTH CARE FACILITY: Primary | ICD-10-CM

## 2025-04-21 DIAGNOSIS — J45.30 MILD PERSISTENT ASTHMA WITHOUT COMPLICATION: ICD-10-CM

## 2025-04-21 DIAGNOSIS — I10 BENIGN ESSENTIAL HYPERTENSION: ICD-10-CM

## 2025-04-21 DIAGNOSIS — N52.9 ERECTILE DYSFUNCTION, UNSPECIFIED ERECTILE DYSFUNCTION TYPE: ICD-10-CM

## 2025-04-21 LAB
ANION GAP SERPL CALCULATED.3IONS-SCNC: 9 MMOL/L (ref 7–15)
BUN SERPL-MCNC: 14.5 MG/DL (ref 6–20)
CALCIUM SERPL-MCNC: 8.8 MG/DL (ref 8.8–10.4)
CHLORIDE SERPL-SCNC: 107 MMOL/L (ref 98–107)
CREAT SERPL-MCNC: 0.9 MG/DL (ref 0.67–1.17)
EGFRCR SERPLBLD CKD-EPI 2021: >90 ML/MIN/1.73M2
GLUCOSE SERPL-MCNC: 101 MG/DL (ref 70–99)
HCO3 SERPL-SCNC: 23 MMOL/L (ref 22–29)
POTASSIUM SERPL-SCNC: 4.5 MMOL/L (ref 3.4–5.3)
PSA SERPL DL<=0.01 NG/ML-MCNC: 0.59 NG/ML (ref 0–3.5)
SODIUM SERPL-SCNC: 139 MMOL/L (ref 135–145)

## 2025-04-21 PROCEDURE — G2211 COMPLEX E/M VISIT ADD ON: HCPCS | Performed by: NURSE PRACTITIONER

## 2025-04-21 PROCEDURE — 99396 PREV VISIT EST AGE 40-64: CPT | Mod: 25 | Performed by: NURSE PRACTITIONER

## 2025-04-21 PROCEDURE — 80048 BASIC METABOLIC PNL TOTAL CA: CPT | Performed by: NURSE PRACTITIONER

## 2025-04-21 PROCEDURE — 90677 PCV20 VACCINE IM: CPT | Performed by: NURSE PRACTITIONER

## 2025-04-21 PROCEDURE — 36415 COLL VENOUS BLD VENIPUNCTURE: CPT | Performed by: NURSE PRACTITIONER

## 2025-04-21 PROCEDURE — 99214 OFFICE O/P EST MOD 30 MIN: CPT | Mod: 25 | Performed by: NURSE PRACTITIONER

## 2025-04-21 PROCEDURE — 3079F DIAST BP 80-89 MM HG: CPT | Performed by: NURSE PRACTITIONER

## 2025-04-21 PROCEDURE — 1126F AMNT PAIN NOTED NONE PRSNT: CPT | Performed by: NURSE PRACTITIONER

## 2025-04-21 PROCEDURE — G0103 PSA SCREENING: HCPCS | Performed by: NURSE PRACTITIONER

## 2025-04-21 PROCEDURE — 3074F SYST BP LT 130 MM HG: CPT | Performed by: NURSE PRACTITIONER

## 2025-04-21 PROCEDURE — 90471 IMMUNIZATION ADMIN: CPT | Performed by: NURSE PRACTITIONER

## 2025-04-21 RX ORDER — ALBUTEROL SULFATE 90 UG/1
INHALANT RESPIRATORY (INHALATION)
Qty: 8.5 G | Refills: 4 | Status: SHIPPED | OUTPATIENT
Start: 2025-04-21

## 2025-04-21 RX ORDER — TADALAFIL 10 MG/1
10 TABLET ORAL DAILY PRN
Qty: 12 TABLET | Refills: 11 | Status: SHIPPED | OUTPATIENT
Start: 2025-04-21

## 2025-04-21 RX ORDER — FLUTICASONE PROPIONATE AND SALMETEROL XINAFOATE 115; 21 UG/1; UG/1
2 AEROSOL, METERED RESPIRATORY (INHALATION) 2 TIMES DAILY
Qty: 12 G | Refills: 3 | Status: SHIPPED | OUTPATIENT
Start: 2025-04-21

## 2025-04-21 RX ORDER — AMLODIPINE BESYLATE 5 MG/1
5 TABLET ORAL DAILY
Qty: 90 TABLET | Refills: 3 | Status: SHIPPED | OUTPATIENT
Start: 2025-04-21

## 2025-04-21 ASSESSMENT — PAIN SCALES - GENERAL: PAINLEVEL_OUTOF10: NO PAIN (0)

## 2025-04-21 ASSESSMENT — ASTHMA QUESTIONNAIRES: ACT_TOTALSCORE: 23

## 2025-04-21 NOTE — PATIENT INSTRUCTIONS
Patient Education   Preventive Care Advice   This is general advice given by our system to help you stay healthy. However, your care team may have specific advice just for you. Please talk to your care team about your preventive care needs.  Nutrition  Eat 5 or more servings of fruits and vegetables each day.  Try wheat bread, brown rice and whole grain pasta (instead of white bread, rice, and pasta).  Get enough calcium and vitamin D. Check the label on foods and aim for 100% of the RDA (recommended daily allowance).  Lifestyle  Exercise at least 150 minutes each week  (30 minutes a day, 5 days a week).  Do muscle strengthening activities 2 days a week. These help control your weight and prevent disease.  No smoking.  Wear sunscreen to prevent skin cancer.  Have a dental exam and cleaning every 6 months.  Yearly exams  See your health care team every year to talk about:  Any changes in your health.  Any medicines your care team has prescribed.  Preventive care, family planning, and ways to prevent chronic diseases.  Shots (vaccines)   HPV shots (up to age 26), if you've never had them before.  Hepatitis B shots (up to age 59), if you've never had them before.  COVID-19 shot: Get this shot when it's due.  Flu shot: Get a flu shot every year.  Tetanus shot: Get a tetanus shot every 10 years.  Pneumococcal, hepatitis A, and RSV shots: Ask your care team if you need these based on your risk.  Shingles shot (for age 50 and up)  General health tests  Diabetes screening:  Starting at age 35, Get screened for diabetes at least every 3 years.  If you are younger than age 35, ask your care team if you should be screened for diabetes.  Cholesterol test: At age 39, start having a cholesterol test every 5 years, or more often if advised.  Bone density scan (DEXA): At age 50, ask your care team if you should have this scan for osteoporosis (brittle bones).  Hepatitis C: Get tested at least once in your life.  STIs (sexually  transmitted infections)  Before age 24: Ask your care team if you should be screened for STIs.  After age 24: Get screened for STIs if you're at risk. You are at risk for STIs (including HIV) if:  You are sexually active with more than one person.  You don't use condoms every time.  You or a partner was diagnosed with a sexually transmitted infection.  If you are at risk for HIV, ask about PrEP medicine to prevent HIV.  Get tested for HIV at least once in your life, whether you are at risk for HIV or not.  Cancer screening tests  Cervical cancer screening: If you have a cervix, begin getting regular cervical cancer screening tests starting at age 21.  Breast cancer scan (mammogram): If you've ever had breasts, begin having regular mammograms starting at age 40. This is a scan to check for breast cancer.  Colon cancer screening: It is important to start screening for colon cancer at age 45.  Have a colonoscopy test every 10 years (or more often if you're at risk) Or, ask your provider about stool tests like a FIT test every year or Cologuard test every 3 years.  To learn more about your testing options, visit:   .  For help making a decision, visit:   https://bit.ly/af43510.  Prostate cancer screening test: If you have a prostate, ask your care team if a prostate cancer screening test (PSA) at age 55 is right for you.  Lung cancer screening: If you are a current or former smoker ages 50 to 80, ask your care team if ongoing lung cancer screenings are right for you.  For informational purposes only. Not to replace the advice of your health care provider. Copyright   2023 [employer]. All rights reserved. Clinically reviewed by the [facility]itions Program. [employer] [id number] - REV 01/24.

## 2025-04-21 NOTE — PROGRESS NOTES
Preventive Care Visit  [facility] SMITHAMORUI Johns CNP, Family Medicine  Apr 21, 2025      Assessment & Plan     Routine general medical examination at a health care facility  Reviewed age appropriate screenings and immunizations.  Discussed weight.  He exercises regularly and has not been able to lose weight.  Wondering about GLP1s.  He is unsure if he has insurance coverage. Recommend checking into coverage and if he has coverage make follow-up vitrual appt to discuss.  He is considering looking into HIMS and using compounded semaglutide.     Mild persistent asthma without complication  Chronic, well controlled.  Continue on current medications. Will send ACT via [employer] to update in 6 mos.   - albuterol (PROAIR HFA/PROVENTIL HFA/VENTOLIN HFA) 108 (90 Base) MCG/ACT inhaler; INHALE TWO PUFFS BY MOUT EVERY SIX HOURS AS NEEDED FOR SHORNTESS OF BREATH OF DYPSNEA  - fluticasone-salmeterol (ADVAIR HFA) 115-21 MCG/ACT inhaler; Inhale 2 puffs into the lungs 2 times daily.    Erectile dysfunction, unspecified erectile dysfunction type  Chronic, med effective.  Continue.   - tadalafil (CIALIS) 10 MG tablet; Take 1 tablet (10 mg) by mouth daily as needed. Take 30 minutes prior to sexual activity, no more than once daily.    Benign essential hypertension  Chronic, well controlled.  Would not recommend dose adjustment at this time.  Start checking BP at home.  Due for lab.   - BASIC METABOLIC PANEL; Future  - amLODIPine (NORVASC) 5 MG tablet; Take 1 tablet (5 mg) by mouth daily.  - BASIC METABOLIC PANEL    History of penicillin allergy  Wondering if he is truly allergic; refer.   - Adult Allergy/Asthma  Referral; Future    Screening for prostate cancer  screen  - PSA, screen; Future  - PSA, screen    The longitudinal plan of care for the diagnosis(es)/condition(s) as documented were addressed during this visit. Due to the added complexity in care, I will continue to support Westley in the  subsequent management and with ongoing continuity of care.          BMI  Estimated body mass index is 31.33 kg/m  as calculated from the following:    Height as of this encounter: 1.829 m (6').    Weight as of this encounter: 104.8 kg (231 lb).   Weight management plan: Discussed healthy diet and exercise guidelines    Counseling  Appropriate preventive services were addressed with this patient via screening, questionnaire, or discussion as appropriate for fall prevention, nutrition, physical activity, Tobacco-use cessation, social engagement, weight loss and cognition.  Checklist reviewing preventive services available has been given to the patient.  Reviewed patient's diet, addressing concerns and/or questions.       Follow-up 1 year physical/asthma/HTN follow-up     Evelyn Christy is a 54 year old, presenting for the following:  Physical, Blood Draw (LABS: Patient IS fasting), and Referral (For allergy testing)        4/21/2025     9:25 AM   Additional Questions   Roomed by Regine         4/21/2025     9:25 AM   Patient Reported Additional Medications   Patient reports taking the following new medications none          HPI     Colonoscopy with Mychal UNDERWOOD; recheck in 7 years.     Hypertension Follow-up    Do you check your blood pressure regularly outside of the clinic? No   Are you following a low salt diet? Yes  Are your blood pressures ever more than 140 on the top number (systolic) OR more   than 90 on the bottom number (diastolic), for example 140/90? Yes  Donates blood as often as able.   SBP running 130-140s.   Wondering if needs a dose change.   Hasn't been checking BP at home.  No CP, SOB, palpitations, and edema.   BP Readings from Last 2 Encounters:   04/21/25 115/81   03/21/25 112/82     Asthma  Patient has not had an ACT done in this encounter: Link to ACT Flowsheet       4/21/2025    12:29 PM   ACT Total Scores   ACT TOTAL SCORE (Goal Greater than or Equal to 20) 23   In the past 12 months, how  many times did you visit the emergency room for your asthma without being admitted to the hospital? 0   In the past 12 months, how many times were you hospitalized overnight because of your asthma? 0     Do you have any of the following symptoms? None of these symptoms (cough/noisy breathing/trouble with breathing)  What makes your asthma/breathing worse?  Upper respiratory illness  Do you want more information about how to use your inhaler? No  Advance Care Planning    Patient states has Health Care Directive and will send to [employer].        4/20/2025   General Health   How would you rate your overall physical health? Good   Feel stress (tense, anxious, or unable to sleep) Only a little   (!) STRESS CONCERN      4/20/2025   Nutrition   Three or more servings of calcium each day? Yes   Diet: Regular (no restrictions)   How many servings of fruit and vegetables per day? 4 or more   How many sweetened beverages each day? 0-1         4/20/2025   Exercise   Days per week of moderate/strenous exercise 6 days   Average minutes spent exercising at this level 40 min         4/20/2025   Social Factors   Frequency of gathering with friends or relatives Once a week   Worry food won't last until get money to buy more No   Food not last or not have enough money for food? No   Do you have housing? (Housing is defined as stable permanent housing and does not include staying ouside in a car, in a tent, in an abandoned building, in an overnight shelter, or couch-surfing.) Yes   Are you worried about losing your housing? No   Lack of transportation? No   Unable to get utilities (heat,electricity)? No         4/20/2025   Fall Risk   Fallen 2 or more times in the past year? No   Trouble with walking or balance? No          4/20/2025   Dental   Dentist two times every year? Yes         Today's PHQ-9 Score:       12/11/2024     8:23 AM   PHQ-9 SCORE   PHQ-9 Total Score MyChart 2 (Minimal depression)   PHQ-9 Total Score 2         "Patient-reported           2025   Substance Use   Alcohol more than 3/day or more than 7/wk No   Do you use any other substances recreationally? No     Social History     Tobacco Use    Smoking status: Former     Current packs/day: 0.00     Types: Cigarettes, Cigars     Start date: 2007     Quit date: 2016     Years since quittin.6     Passive exposure: Past    Smokeless tobacco: Never    Tobacco comments:     Originally smoked from  to , restarted again .   Vaping Use    Vaping status: Never Used   Substance Use Topics    Alcohol use: Yes     Comment: occasional use    Drug use: No           2025   STI Screening   New sexual partner(s) since last STI/HIV test? No   ASCVD Risk   The 10-year ASCVD risk score (Harvinder CASTELLANOS, et al., 2019) is: 3.2%    Values used to calculate the score:      Age: 54 years      Sex: Male      Is Non- : No      Diabetic: No      Tobacco smoker: No      Systolic Blood Pressure: 115 mmHg      Is BP treated: Yes      HDL Cholesterol: 51 mg/dL      Total Cholesterol: 153 mg/dL           Reviewed and updated as needed this visit by Provider   Tobacco  Allergies  Meds  Problems  Med Hx  Surg Hx  Fam Hx            Past Medical History:   Diagnosis Date    Anxiety     Asthma     Depression     Depressive disorder     Hypertension 2009    On the high end of \"normal\" 120/80s    Kidney infection 2009    Hospitalized x 2    Thyroid disease     Hashimoto's diagnosis      Past Surgical History:   Procedure Laterality Date    COLONOSCOPY N/A 3/21/2025    Procedure: Colonoscopy polypectomies with forceps and snare;  Surgeon: Sebastián Orozco MD;  Location:  GI    COLONOSCOPY N/A 3/21/2025    Procedure: COLONOSCOPY, FLEXIBLE, WITH LESION REMOVAL USING SNARE;  Surgeon: Sebastián Orozco MD;  Location:  GI    ENT SURGERY      nasal polyps times 2    GENITOURINARY SURGERY  2009    Vascetomy    HERNIA REPAIR      under 3 years old "    MAMMOPLASTY REDUCTION  age 19     Labs reviewed in EPIC  BP Readings from Last 3 Encounters:   04/21/25 115/81   03/21/25 112/82   03/20/25 (!) 144/94    Wt Readings from Last 3 Encounters:   04/21/25 104.8 kg (231 lb)   02/21/25 105.7 kg (233 lb)   12/20/24 105.2 kg (232 lb)                  Current Outpatient Medications   Medication Sig Dispense Refill    albuterol (PROAIR HFA/PROVENTIL HFA/VENTOLIN HFA) 108 (90 Base) MCG/ACT inhaler INHALE TWO PUFFS BY MOUT EVERY SIX HOURS AS NEEDED FOR SHORNTESS OF BREATH OF DYPSNEA 8.5 g 4    amitriptyline (ELAVIL) 50 MG tablet Take 1-2 tablets ( mg) by mouth at bedtime. 60 tablet 0    amLODIPine (NORVASC) 5 MG tablet Take 1 tablet (5 mg) by mouth daily. 90 tablet 3    diclofenac (VOLTAREN) 75 MG EC tablet Take 1 tablet (75 mg) by mouth 2 times daily as needed for moderate pain. Take one tab twice daily by mouth with meals for two weeks followed by as needed up to twice daily. 60 tablet 0    fluticasone-salmeterol (ADVAIR HFA) 115-21 MCG/ACT inhaler Inhale 2 puffs into the lungs 2 times daily. 12 g 3    mometasone (NASONEX) 50 MCG/ACT nasal spray       rimegepant (NURTEC) 75 MG ODT tablet Place 1 tablet (75 mg) under the tongue daily as needed for migraine. Maximum of 1 tablet every 24 hours. 8 tablet 3    rizatriptan (MAXALT) 10 MG tablet TAKE 0.5 TABLET BY MOUTH AT ONSET OF HEADACHE FOR MIGRAINE. MAY REPEAT IN 2 HOURS. MAX OF 3 TABLETS BY MOUTH IN 24 HOURS. 10 tablet 0    tadalafil (CIALIS) 10 MG tablet Take 1 tablet (10 mg) by mouth daily as needed. Take 30 minutes prior to sexual activity, no more than once daily. 12 tablet 11    topiramate (TOPAMAX) 50 MG tablet Take 1 tablet (50 mg) by mouth 2 times daily. 60 tablet 0     Allergies   Allergen Reactions    Aspirin     Cephalosporins     Penicillins      Mom had told him he is allergic to PCN.   Listened to a podcast about PCN allergies and would like to see an allergist to discuss PCN allergy.        Objective     Exam  /81 (BP Location: Right arm, Patient Position: Sitting, Cuff Size: Adult Large)   Pulse 87   Temp 98.2  F (36.8  C) (Oral)   Resp 18   Ht 1.829 m (6')   Wt 104.8 kg (231 lb)   SpO2 99%   BMI 31.33 kg/m     Estimated body mass index is 31.33 kg/m  as calculated from the following:    Height as of this encounter: 1.829 m (6').    Weight as of this encounter: 104.8 kg (231 lb).    Physical Exam  GENERAL: alert and no distress  EYES: Eyes grossly normal to inspection, PERRL and conjunctivae and sclerae normal  HENT: ear canals and TM's normal, nose and mouth without ulcers or lesions  NECK: no adenopathy, no asymmetry, masses, or scars  RESP: lungs clear to auscultation - no rales, rhonchi or wheezes  CV: regular rate and rhythm, normal S1 S2, no S3 or S4, no murmur, click or rub, no peripheral edema  ABDOMEN: soft, nontender, no hepatosplenomegaly, no masses and bowel sounds normal  MS: no gross musculoskeletal defects noted, no edema  SKIN: no suspicious lesions or rashes  NEURO: Normal strength and tone, mentation intact and speech normal  PSYCH: mentation appears normal, affect normal/bright        Signed Electronically by: RUI Dickinson CNP

## 2025-05-02 DIAGNOSIS — G43.719 CHRONIC MIGRAINE WITHOUT AURA, INTRACTABLE, WITHOUT STATUS MIGRAINOSUS: ICD-10-CM

## 2025-05-05 NOTE — TELEPHONE ENCOUNTER
Topiramate Oral Tablet 50 MG   Last Written Prescription:  3/20/25  #60, 0 refills  ----------------------  Last Visit Date: 3/20/25  Future Visit Date: 6/12/25  ----------------------    Refill decision: Medication unable to be refilled by RN due to: Medication not included in refill protocol policy     Request from pharmacy:  Requested Prescriptions   Pending Prescriptions Disp Refills    topiramate (TOPAMAX) 50 MG tablet [Pharmacy Med Name: Topiramate Oral Tablet 50 MG] 60 tablet 0     Sig: Take 1 tablet (50 mg) by mouth 2 times daily.       Anti-Seizure Meds Protocol  Failed - 5/5/2025  9:10 AM        Failed - Review Authorizing provider's last note.      Refer to last progress notes: confirm request is for original authorizing provider (cannot be through other providers).          Passed - Normal ALT or AST on file in past 26 months     Recent Labs   Lab Test 03/08/23  0911   ALT 44     Recent Labs   Lab Test 03/08/23  0911   AST 35                          Passed - Has GFR on file in past 12 months and most recent value is normal        Passed - Recent (12 mo) or future (90 days) visit within the authorizing provider's specialty     The patient must have completed an in-person or virtual visit within the past 12 months or has a future visit scheduled within the next 90 days with the authorizing provider s specialty.  Urgent care and e-visits do not qualify as an office visit for this protocol.          Passed - Medication indicated for associated diagnosis     Medication is associated with one or more of the following diagnoses:     Bipolar   Dementia   Depression   Epilepsy   Migraine   Seizure   Trigeminal Neuralgia   Cyclothymia

## 2025-05-06 RX ORDER — TOPIRAMATE 50 MG/1
50 TABLET, FILM COATED ORAL 2 TIMES DAILY
Qty: 60 TABLET | Refills: 3 | Status: SHIPPED | OUTPATIENT
Start: 2025-05-06

## 2025-06-04 DIAGNOSIS — G43.719 CHRONIC MIGRAINE WITHOUT AURA, INTRACTABLE, WITHOUT STATUS MIGRAINOSUS: Primary | ICD-10-CM

## 2025-06-11 NOTE — PROGRESS NOTES
[facility]    PM&R CLINIC NOTE  BOTULINUM TOXIN PROCEDURE      HPI  CC: Chronic migraine headaches     Westley Singh is a 55 year old male with a history of post concussion syndrome and intractable migraine headaches who presents to clinic for botulinum toxin injections for management of chronic migraine headaches.     SINCE LAST VISIT  Westley Snigh was last seen here in clinic on 3/20/25, at which time he received 155 units of Botox.     Since the last visit, he hit his head on a glass door while bending down approximately 6 weeks ago. He also has been starting to feel more dizzy starting approximately 8 weeks ago. He potentially attributes this dizziness to Topamax vs. Additional headaches. Migraines were also a little worse this cycle. He notes increased stress at work and bedbugs at home with increased migraines after the chemical treatment for this infestation. Immediately after the chemical treatment, 6 weeks ago, he has daily headaches for 2 weeks, utilizing Nurtec, which works well, sparingly.  He has not seen a neurologist for his headaches in >1 year.     Patient denies new medical diagnoses, illnesses, hospitalizations, emergency room visits, and injuries since the previous injection with botulinum neurotoxin.       RESPONSE TO PREVIOUS TREATMENT    Side effects: No problems reported.  Post-procedural headache: No, he did not get a headache right after the injections or that day.      1.  Headache frequency during this injection cycle: This past round of Botox was amazing, and he had 14 migraines this entire injection cycle, which he attributed to stress and exposure to chemicals approximately 6 weeks ago and only migraine over the last 2 weeks. This is compared to his baseline headache frequency of 30 headache days per month.     2.  Headache duration during this injection cycle:  Headache duration ranged from 12 hours to several days. Patient reports one episodes of multiple  day headaches during this injection cycle.     3.  Headache intensity during this injection cycle:    A.  2-3/10  =  Typical pain level.  B.  5-6/10  =  Worst pain level.  C.  0/10  =  Lowest pain level.    4.  Change in headache medication usage during this injection cycle:  (For Example:  Able to decrease use of oral pain medications.) He has been using Nurtec as needed for migraine rescue, and this works very well. He uses Tylenol for the more mild headaches, and also has rizatriptan available as a prn. He also uses Topomax (for the last 3+ years).     5.  ER Visits During This Injection Cycle:  None.     6.  Functional Performance:  Change in ADL's, social interaction, days lost from work, etc. Patient reports being able to more fully participate in social and family activities and responsibilities as headache symptoms have improved      PHYSICAL EXAM  VS: There were no vitals taken for this visit.   General: in NAD, resting comfortably in chair, conversational and alert  Pulm: breathing comfortably on room air, no accessory muscle use  Cardio: warm and well-perfused peripherally  Neuro/MSK: face symmetric       ALLERGIES  Allergies   Allergen Reactions    Aspirin     Cephalosporins     Penicillins        CURRENT MEDICATIONS    Current Outpatient Medications:     albuterol (PROAIR HFA/PROVENTIL HFA/VENTOLIN HFA) 108 (90 Base) MCG/ACT inhaler, INHALE TWO PUFFS BY MOUT EVERY SIX HOURS AS NEEDED FOR SHORNTESS OF BREATH OF DYPSNEA, Disp: 8.5 g, Rfl: 4    amitriptyline (ELAVIL) 50 MG tablet, Take 1-2 tablets ( mg) by mouth at bedtime., Disp: 60 tablet, Rfl: 0    amLODIPine (NORVASC) 5 MG tablet, Take 1 tablet (5 mg) by mouth daily., Disp: 90 tablet, Rfl: 3    diclofenac (VOLTAREN) 75 MG EC tablet, Take 1 tablet (75 mg) by mouth 2 times daily as needed for moderate pain. Take one tab twice daily by mouth with meals for two weeks followed by as needed up to twice daily., Disp: 60 tablet, Rfl: 0     fluticasone-salmeterol (ADVAIR HFA) 115-21 MCG/ACT inhaler, Inhale 2 puffs into the lungs 2 times daily., Disp: 12 g, Rfl: 3    mometasone (NASONEX) 50 MCG/ACT nasal spray, , Disp: , Rfl:     rimegepant (NURTEC) 75 MG ODT tablet, Place 1 tablet (75 mg) under the tongue daily as needed for migraine. Maximum of 1 tablet every 24 hours., Disp: 8 tablet, Rfl: 3    rizatriptan (MAXALT) 10 MG tablet, TAKE 0.5 TABLET BY MOUTH AT ONSET OF HEADACHE FOR MIGRAINE. MAY REPEAT IN 2 HOURS. MAX OF 3 TABLETS BY MOUTH IN 24 HOURS., Disp: 10 tablet, Rfl: 0    tadalafil (CIALIS) 10 MG tablet, Take 1 tablet (10 mg) by mouth daily as needed. Take 30 minutes prior to sexual activity, no more than once daily., Disp: 12 tablet, Rfl: 11    topiramate (TOPAMAX) 50 MG tablet, Take 1 tablet (50 mg) by mouth 2 times daily., Disp: 60 tablet, Rfl: 3    Current Facility-Administered Medications:     [START ON 2025] botulinum toxin type A (BOTOX) 100 units injection 155 Units, 155 Units, Intramuscular, Q12 weeks, Kim Long MD    bupivacaine (MARCAINE) 0.25% preservative free injection, 3 mL, Other, Once,        BOTULINUM NEUROTOXIN INJECTION PROCEDURES    VERIFICATION OF PATIENT IDENTIFICATION AND PROCEDURE     Initials   Patient Name SES   Patient  SES   Procedure Verified by: SES     Prior to the start of the procedure and with procedural staff participation, I verbally confirmed the patient s identity using two indicators, relevant allergies, that the procedure was appropriate and matched the consent or emergent situation, and that the correct equipment/implants were available. Immediately prior to starting the procedure I conducted the Time Out with the procedural staff and re-confirmed the patient s name, procedure, and site/side. (The Joint Commission universal protocol was followed.)  Yes    Sedation (Moderate or Deep): None    ABOVE ASSESSMENTS AND PROCEDURE BELOW PERFORMED BY    Kim Long MD and Manisha  DO Genoveva      INDICATIONS FOR PROCEDURES  Westley Singh is a 55 year old patient with chronic migraine headaches which have been recalcitrant to oral medications and conservative therapy.  He is here today for injection with Botox.    GOAL OF PROCEDURE  The goal of this procedure is to increase active range of motion, improve volitional motor control and decrease pain.      TOTAL DOSE ADMINISTERED  Dose Administered:  155 units  Botox (Botulinum Toxin Type A)       2:1 Dilution   Unavoidable Drug Waste: Yes  Amount of drug waste (mL): 45 units Botox.  Reason for waste:  Single use vial  Diluent Used:  0.25% bupivacaine  Total Volume of Diluent Used:  3 ml  NDC #: Botox 100u ([address]-71)      CONSENT  The risks, benefits, and treatment options were discussed with Westley Singh and he agreed to proceed.    Written consent was obtained by [facility].     EQUIPMENT USED  Needle-30 gauge    SKIN PREPARATION  Skin preparation was performed using an alcohol wipe.    GUIDANCE DESCRIPTION  Guidance was not utilized for this procedure       AREA/MUSCLE INJECTED: 155 UNITS BOTOX = TOTAL DOSE, 2:1 DILUTION  1. NECK & SHOULDER GIRDLE MUSCLES: 35 UNITS BOTOX = TOTAL DOSE, 2:1 DILUTION  Right Upper Trapezius (upper, mid & low cervical) - 7.5 units of Botox at 3 site/s.   Left Upper Trapezius (upper, mid & low cervical) - 7.5 units of Botox at 3 site/s.     Right Splenius - 5 units of Botox at 1 site/s.   Left Splenius - 5 units of Botox at 1 site/s.     Right Levator Scapulae - 5 units of Botox at 1 site/s.   Left Levator Scapulae - 5 units of Botox at 1 site/s.    3. FACIAL & SCALP MUSCLES: 120 UNITS BOTOX = TOTAL DOSE, 2:1 DILUTION  Right Frontalis - 10 units of Botox at 2 site/s.  Left Frontalis - 10 units of Botox at 2 site/s.    Right Temporalis - 25 units of Botox at 5 site/s.  Left Temporalis - 25 units of Botox at 5 site/s.    Right Occipitalis - 20 units of Botox at 4 site/s.   Left Occipitalis - 20 units of Botox at 4  site/s.     Right  - 2.5 units of Botox at 1 site/s.   Left  - 2.5 units of Botox at 1 site/s.    Procerus - 5 units of Botox at 1 site/s.       RESPONSE TO PROCEDURE  Westley Singh tolerated the procedure well and there were no immediate complications. He was allowed to recover for an appropriate period of time and was discharged home in stable condition.    ASSESSMENT AND PLAN   Botulinum toxin injections: No changes made to Botox dose or distribution today. Patient will continue to monitor response to Botox and report at next appointment.    Medications: With increased migraines this last cycle as well as dizziness and fatigue, instructed to take Nurtec q2 days. Additionally, instructed to wean off of Topamax, taking 1 tablet at night and dropping AM dose for 2 weeks and then discontinue. Additionally placed order to establish care with neurology for migraine headache medication management, without a neurology appointment in over 1 year.   Referrals: None.   Follow up: Westley Singh was rescheduled for the next series of injections in 12 weeks, at which time we will evaluate response to today's injections. he may call the clinic prior with any questions or concerns prior to the next appointment.     Manisha Tomas  PM&R Resident    Patient seen and examined by attending PM&R physician, Dr. Long, who agrees with note.       I spent a total of 20 minutes, face-to-face and managing the care of Westley Singh, excluding the procedure. Over 50% of my time was spent counseling the patient and coordinating care. Please see note for details.       I, Kim Long MD, saw this patient with resident, Dr. Tomas, and agree with the findings and plan of care as documented in this note. I personally reviewed the chart (vitals signs, medications, labs and imaging). My key findings and key management decisions made are reflected in this note.  I was present for the entire procedure listed above.       Kim Long MD

## 2025-06-12 ENCOUNTER — OFFICE VISIT (OUTPATIENT)
Dept: PHYSICAL MEDICINE AND REHAB | Facility: CLINIC | Age: 55
End: 2025-06-12
Payer: COMMERCIAL

## 2025-06-12 VITALS — SYSTOLIC BLOOD PRESSURE: 114 MMHG | HEART RATE: 95 BPM | DIASTOLIC BLOOD PRESSURE: 73 MMHG

## 2025-06-12 DIAGNOSIS — G43.719 CHRONIC MIGRAINE WITHOUT AURA, INTRACTABLE, WITHOUT STATUS MIGRAINOSUS: ICD-10-CM

## 2025-06-12 RX ORDER — BUPIVACAINE HYDROCHLORIDE 2.5 MG/ML
3 INJECTION, SOLUTION EPIDURAL; INFILTRATION; INTRACAUDAL; PERINEURAL ONCE
Status: COMPLETED | OUTPATIENT
Start: 2025-06-12 | End: 2025-06-12

## 2025-06-12 RX ADMIN — BUPIVACAINE HYDROCHLORIDE 7.5 MG: 2.5 INJECTION, SOLUTION EPIDURAL; INFILTRATION; INTRACAUDAL; PERINEURAL at 13:43

## 2025-06-12 NOTE — LETTER
6/12/2025      Westley Singh  [address] [id number]      Dear Colleague,    Thank you for referring your patient, Westley Singh, to the [facility]. Please see a copy of my visit note below.      [facility]    PM&R CLINIC NOTE  BOTULINUM TOXIN PROCEDURE      HPI  CC: Chronic migraine headaches     Westley Singh is a 55 year old male with a history of post concussion syndrome and intractable migraine headaches who presents to clinic for botulinum toxin injections for management of chronic migraine headaches.     SINCE LAST VISIT  Westley Singh was last seen here in clinic on 3/20/25, at which time he received 155 units of Botox.     Since the last visit, he hit his head on a glass door while bending down approximately 6 weeks ago. He also has been starting to feel more dizzy starting approximately 8 weeks ago. He potentially attributes this dizziness to Topamax vs. Additional headaches. Migraines were also a little worse this cycle. He notes increased stress at work and bedbugs at home with increased migraines after the chemical treatment for this infestation. Immediately after the chemical treatment, 6 weeks ago, he has daily headaches for 2 weeks, utilizing Nurtec, which works well, sparingly.  He has not seen a neurologist for his headaches in >1 year.     Patient denies new medical diagnoses, illnesses, hospitalizations, emergency room visits, and injuries since the previous injection with botulinum neurotoxin.       RESPONSE TO PREVIOUS TREATMENT    Side effects: No problems reported.  Post-procedural headache: No, he did not get a headache right after the injections or that day.      1.  Headache frequency during this injection cycle: This past round of Botox was amazing, and he had 14 migraines this entire injection cycle, which he attributed to stress and exposure to chemicals approximately 6 weeks ago and only migraine over the last 2  weeks. This is compared to his baseline headache frequency of 30 headache days per month.     2.  Headache duration during this injection cycle:  Headache duration ranged from 12 hours to several days. Patient reports one episodes of multiple day headaches during this injection cycle.     3.  Headache intensity during this injection cycle:    A.  2-3/10  =  Typical pain level.  B.  5-6/10  =  Worst pain level.  C.  0/10  =  Lowest pain level.    4.  Change in headache medication usage during this injection cycle:  (For Example:  Able to decrease use of oral pain medications.) He has been using Nurtec as needed for migraine rescue, and this works very well. He uses Tylenol for the more mild headaches, and also has rizatriptan available as a prn. He also uses Topomax (for the last 3+ years).     5.  ER Visits During This Injection Cycle:  None.     6.  Functional Performance:  Change in ADL's, social interaction, days lost from work, etc. Patient reports being able to more fully participate in social and family activities and responsibilities as headache symptoms have improved      PHYSICAL EXAM  VS: There were no vitals taken for this visit.   General: in NAD, resting comfortably in chair, conversational and alert  Pulm: breathing comfortably on room air, no accessory muscle use  Cardio: warm and well-perfused peripherally  Neuro/MSK: face symmetric       ALLERGIES  Allergies   Allergen Reactions     Aspirin      Cephalosporins      Penicillins        CURRENT MEDICATIONS    Current Outpatient Medications:      albuterol (PROAIR HFA/PROVENTIL HFA/VENTOLIN HFA) 108 (90 Base) MCG/ACT inhaler, INHALE TWO PUFFS BY MOUT EVERY SIX HOURS AS NEEDED FOR SHORNTESS OF BREATH OF DYPSNEA, Disp: 8.5 g, Rfl: 4     amitriptyline (ELAVIL) 50 MG tablet, Take 1-2 tablets ( mg) by mouth at bedtime., Disp: 60 tablet, Rfl: 0     amLODIPine (NORVASC) 5 MG tablet, Take 1 tablet (5 mg) by mouth daily., Disp: 90 tablet, Rfl: 3      diclofenac (VOLTAREN) 75 MG EC tablet, Take 1 tablet (75 mg) by mouth 2 times daily as needed for moderate pain. Take one tab twice daily by mouth with meals for two weeks followed by as needed up to twice daily., Disp: 60 tablet, Rfl: 0     fluticasone-salmeterol (ADVAIR HFA) 115-21 MCG/ACT inhaler, Inhale 2 puffs into the lungs 2 times daily., Disp: 12 g, Rfl: 3     mometasone (NASONEX) 50 MCG/ACT nasal spray, , Disp: , Rfl:      rimegepant (NURTEC) 75 MG ODT tablet, Place 1 tablet (75 mg) under the tongue daily as needed for migraine. Maximum of 1 tablet every 24 hours., Disp: 8 tablet, Rfl: 3     rizatriptan (MAXALT) 10 MG tablet, TAKE 0.5 TABLET BY MOUTH AT ONSET OF HEADACHE FOR MIGRAINE. MAY REPEAT IN 2 HOURS. MAX OF 3 TABLETS BY MOUTH IN 24 HOURS., Disp: 10 tablet, Rfl: 0     tadalafil (CIALIS) 10 MG tablet, Take 1 tablet (10 mg) by mouth daily as needed. Take 30 minutes prior to sexual activity, no more than once daily., Disp: 12 tablet, Rfl: 11     topiramate (TOPAMAX) 50 MG tablet, Take 1 tablet (50 mg) by mouth 2 times daily., Disp: 60 tablet, Rfl: 3    Current Facility-Administered Medications:      [START ON 2025] botulinum toxin type A (BOTOX) 100 units injection 155 Units, 155 Units, Intramuscular, Q12 weeks, Standal, Kim Johnson MD     bupivacaine (MARCAINE) 0.25% preservative free injection, 3 mL, Other, Once,        BOTULINUM NEUROTOXIN INJECTION PROCEDURES    VERIFICATION OF PATIENT IDENTIFICATION AND PROCEDURE     Initials   Patient Name SES   Patient  SES   Procedure Verified by: SES     Prior to the start of the procedure and with procedural staff participation, I verbally confirmed the patient s identity using two indicators, relevant allergies, that the procedure was appropriate and matched the consent or emergent situation, and that the correct equipment/implants were available. Immediately prior to starting the procedure I conducted the Time Out with the procedural staff and  re-confirmed the patient s name, procedure, and site/side. (The Joint Commission universal protocol was followed.)  Yes    Sedation (Moderate or Deep): None    ABOVE ASSESSMENTS AND PROCEDURE BELOW PERFORMED BY    Kim Long MD and Manisha Tomas DO      INDICATIONS FOR PROCEDURES  Westley Singh is a 55 year old patient with chronic migraine headaches which have been recalcitrant to oral medications and conservative therapy.  He is here today for injection with Botox.    GOAL OF PROCEDURE  The goal of this procedure is to increase active range of motion, improve volitional motor control and decrease pain.      TOTAL DOSE ADMINISTERED  Dose Administered:  155 units  Botox (Botulinum Toxin Type A)       2:1 Dilution   Unavoidable Drug Waste: Yes  Amount of drug waste (mL): 45 units Botox.  Reason for waste:  Single use vial  Diluent Used:  0.25% bupivacaine  Total Volume of Diluent Used:  3 ml  NDC #: Botox 100u ([id number])      CONSENT  The risks, benefits, and treatment options were discussed with Westley Singh and he agreed to proceed.    Written consent was obtained by [facility].     EQUIPMENT USED  Needle-30 gauge    SKIN PREPARATION  Skin preparation was performed using an alcohol wipe.    GUIDANCE DESCRIPTION  Guidance was not utilized for this procedure       AREA/MUSCLE INJECTED: 155 UNITS BOTOX = TOTAL DOSE, 2:1 DILUTION  1. NECK & SHOULDER GIRDLE MUSCLES: 35 UNITS BOTOX = TOTAL DOSE, 2:1 DILUTION  Right Upper Trapezius (upper, mid & low cervical) - 7.5 units of Botox at 3 site/s.   Left Upper Trapezius (upper, mid & low cervical) - 7.5 units of Botox at 3 site/s.     Right Splenius - 5 units of Botox at 1 site/s.   Left Splenius - 5 units of Botox at 1 site/s.     Right Levator Scapulae - 5 units of Botox at 1 site/s.   Left Levator Scapulae - 5 units of Botox at 1 site/s.    3. FACIAL & SCALP MUSCLES: 120 UNITS BOTOX = TOTAL DOSE, 2:1 DILUTION  Right Frontalis - 10 units of Botox at 2 site/s.  Left  Frontalis - 10 units of Botox at 2 site/s.    Right Temporalis - 25 units of Botox at 5 site/s.  Left Temporalis - 25 units of Botox at 5 site/s.    Right Occipitalis - 20 units of Botox at 4 site/s.   Left Occipitalis - 20 units of Botox at 4 site/s.     Right  - 2.5 units of Botox at 1 site/s.   Left  - 2.5 units of Botox at 1 site/s.    Procerus - 5 units of Botox at 1 site/s.       RESPONSE TO PROCEDURE  Westley Singh tolerated the procedure well and there were no immediate complications. He was allowed to recover for an appropriate period of time and was discharged home in stable condition.    ASSESSMENT AND PLAN   Botulinum toxin injections: No changes made to Botox dose or distribution today. Patient will continue to monitor response to Botox and report at next appointment.    Medications: With increased migraines this last cycle as well as dizziness and fatigue, instructed to take Nurtec q2 days. Additionally, instructed to wean off of Topamax, taking 1 tablet at night and dropping AM dose for 2 weeks and then discontinue. Additionally placed order to establish care with neurology for migraine headache medication management, without a neurology appointment in over 1 year.   Referrals: None.   Follow up: Westley Singh was rescheduled for the next series of injections in 12 weeks, at which time we will evaluate response to today's injections. he may call the clinic prior with any questions or concerns prior to the next appointment.     Manisha Tomas  PM&R Resident    Patient seen and examined by attending PM&R physician, Dr. Long, who agrees with note.       I spent a total of 20 minutes, face-to-face and managing the care of Westley Singh, excluding the procedure. Over 50% of my time was spent counseling the patient and coordinating care. Please see note for details.       I, Kim Long MD, saw this patient with resident, Dr. Tomas, and agree with the findings and plan of  care as documented in this note. I personally reviewed the chart (vitals signs, medications, labs and imaging). My key findings and key management decisions made are reflected in this note.  I was present for the entire procedure listed above.      Kim Long MD        Again, thank you for allowing me to participate in the care of your patient.        Sincerely,        Kim Long MD    Electronically signed

## 2025-06-12 NOTE — PATIENT INSTRUCTIONS
Nurtec has been changed from as needed to every other day. Once this gets filled, you can start weaning off of the topiramate. Start by taking one tablet at night, dropping your am dose. Do this for about two weeks and then discontinue. If headaches increase substantially, please let me know.      Kim Long MD

## 2025-06-16 ENCOUNTER — PATIENT OUTREACH (OUTPATIENT)
Dept: CARE COORDINATION | Facility: CLINIC | Age: 55
End: 2025-06-16
Payer: COMMERCIAL

## 2025-06-19 ENCOUNTER — THERAPY VISIT (OUTPATIENT)
Dept: PHYSICAL THERAPY | Facility: CLINIC | Age: 55
End: 2025-06-19
Payer: COMMERCIAL

## 2025-06-19 DIAGNOSIS — M25.562 ACUTE PAIN OF LEFT KNEE: Primary | ICD-10-CM

## 2025-06-19 NOTE — PROGRESS NOTES
DISCHARGE  Reason for Discharge: Patient has met all goals.    Equipment Issued:     Discharge Plan: Patient to continue home program.    Referring Provider:  Mena Lara

## 2025-06-20 DIAGNOSIS — F07.81 POST CONCUSSION SYNDROME: ICD-10-CM

## 2025-06-20 DIAGNOSIS — G47.00 INSOMNIA, UNSPECIFIED TYPE: ICD-10-CM

## 2025-06-24 RX ORDER — AMITRIPTYLINE HYDROCHLORIDE 50 MG/1
50-100 TABLET ORAL AT BEDTIME
Qty: 60 TABLET | Refills: 2 | Status: SHIPPED | OUTPATIENT
Start: 2025-06-24

## 2025-08-16 DIAGNOSIS — G43.719 CHRONIC MIGRAINE WITHOUT AURA, INTRACTABLE, WITHOUT STATUS MIGRAINOSUS: ICD-10-CM

## 2025-08-21 RX ORDER — TOPIRAMATE 50 MG/1
50 TABLET, FILM COATED ORAL 2 TIMES DAILY
Qty: 60 TABLET | Refills: 3 | Status: SHIPPED | OUTPATIENT
Start: 2025-08-21

## 2025-09-04 ENCOUNTER — OFFICE VISIT (OUTPATIENT)
Dept: PHYSICAL MEDICINE AND REHAB | Facility: CLINIC | Age: 55
End: 2025-09-04
Payer: COMMERCIAL

## 2025-09-04 VITALS — DIASTOLIC BLOOD PRESSURE: 79 MMHG | HEART RATE: 76 BPM | SYSTOLIC BLOOD PRESSURE: 121 MMHG

## 2025-09-04 DIAGNOSIS — G43.719 CHRONIC MIGRAINE WITHOUT AURA, INTRACTABLE, WITHOUT STATUS MIGRAINOSUS: Primary | ICD-10-CM

## 2025-09-04 RX ADMIN — BUPIVACAINE HYDROCHLORIDE 7.5 MG: 2.5 INJECTION, SOLUTION EPIDURAL; INFILTRATION; INTRACAUDAL; PERINEURAL at 09:04

## (undated) DEVICE — ENDO SNARE EXACTO COLD 9MM LOOP 2.4MMX230CM 00711115

## (undated) DEVICE — ENDO FORCEP SPIKED SERRATED SHAFT JUMBO 239CM G56998

## (undated) RX ORDER — SIMETHICONE 40MG/0.6ML
SUSPENSION, DROPS(FINAL DOSAGE FORM)(ML) ORAL
Status: DISPENSED
Start: 2025-03-21

## (undated) RX ORDER — FENTANYL CITRATE 50 UG/ML
INJECTION, SOLUTION INTRAMUSCULAR; INTRAVENOUS
Status: DISPENSED
Start: 2025-03-21